# Patient Record
Sex: MALE | Race: BLACK OR AFRICAN AMERICAN
[De-identification: names, ages, dates, MRNs, and addresses within clinical notes are randomized per-mention and may not be internally consistent; named-entity substitution may affect disease eponyms.]

---

## 2018-04-24 ENCOUNTER — HOSPITAL ENCOUNTER (EMERGENCY)
Dept: HOSPITAL 17 - NEPD | Age: 28
Discharge: HOME | End: 2018-04-24
Payer: COMMERCIAL

## 2018-04-24 VITALS
SYSTOLIC BLOOD PRESSURE: 144 MMHG | HEART RATE: 98 BPM | OXYGEN SATURATION: 98 % | RESPIRATION RATE: 18 BRPM | TEMPERATURE: 98.4 F | DIASTOLIC BLOOD PRESSURE: 61 MMHG

## 2018-04-24 VITALS — HEIGHT: 70 IN | BODY MASS INDEX: 31.56 KG/M2 | WEIGHT: 220.46 LBS

## 2018-04-24 DIAGNOSIS — S41.112A: Primary | ICD-10-CM

## 2018-04-24 DIAGNOSIS — Z23: ICD-10-CM
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
## 2018-04-24 DIAGNOSIS — Z21: ICD-10-CM

## 2018-04-24 DIAGNOSIS — F17.210: ICD-10-CM

## 2018-04-24 PROCEDURE — 12002 RPR S/N/AX/GEN/TRNK2.6-7.5CM: CPT

## 2018-04-24 PROCEDURE — 90471 IMMUNIZATION ADMIN: CPT

## 2018-04-24 PROCEDURE — 90714 TD VACC NO PRESV 7 YRS+ IM: CPT
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
## 2018-10-03 ENCOUNTER — HOSPITAL ENCOUNTER (EMERGENCY)
Age: 28
Discharge: HOME | End: 2018-10-03
Payer: COMMERCIAL

## 2018-10-03 VITALS
TEMPERATURE: 98.2 F | HEIGHT: 70 IN | WEIGHT: 224 LBS | BODY MASS INDEX: 32.07 KG/M2 | SYSTOLIC BLOOD PRESSURE: 115 MMHG | DIASTOLIC BLOOD PRESSURE: 63 MMHG | RESPIRATION RATE: 16 BRPM | OXYGEN SATURATION: 99 % | HEART RATE: 75 BPM

## 2018-10-03 DIAGNOSIS — T22.112A BURN OF FIRST DEGREE OF LEFT FOREARM, INITIAL ENCOUNTER: Primary | ICD-10-CM

## 2018-10-03 DIAGNOSIS — T22.212A BURN OF SECOND DEGREE OF LEFT FOREARM, INITIAL ENCOUNTER: ICD-10-CM

## 2018-10-03 PROCEDURE — 6370000000 HC RX 637 (ALT 250 FOR IP): Performed by: PHYSICIAN ASSISTANT

## 2018-10-03 PROCEDURE — 99283 EMERGENCY DEPT VISIT LOW MDM: CPT

## 2018-10-03 RX ORDER — IBUPROFEN 800 MG/1
800 TABLET ORAL EVERY 8 HOURS PRN
Qty: 15 TABLET | Refills: 0 | Status: SHIPPED | OUTPATIENT
Start: 2018-10-03 | End: 2022-08-05

## 2018-10-03 RX ORDER — HYDROCODONE BITARTRATE AND ACETAMINOPHEN 5; 325 MG/1; MG/1
1 TABLET ORAL ONCE
Status: COMPLETED | OUTPATIENT
Start: 2018-10-03 | End: 2018-10-03

## 2018-10-03 RX ORDER — BACITRACIN, NEOMYCIN, POLYMYXIN B 400; 3.5; 5 [USP'U]/G; MG/G; [USP'U]/G
OINTMENT TOPICAL
Qty: 30 G | Refills: 1 | Status: ON HOLD | OUTPATIENT
Start: 2018-10-03 | End: 2022-02-21 | Stop reason: ALTCHOICE

## 2018-10-03 RX ORDER — HYDROCODONE BITARTRATE AND ACETAMINOPHEN 5; 325 MG/1; MG/1
1 TABLET ORAL EVERY 6 HOURS PRN
Qty: 6 TABLET | Refills: 0 | Status: SHIPPED | OUTPATIENT
Start: 2018-10-03 | End: 2018-10-05

## 2018-10-03 RX ORDER — IBUPROFEN 800 MG/1
800 TABLET ORAL ONCE
Status: COMPLETED | OUTPATIENT
Start: 2018-10-03 | End: 2018-10-03

## 2018-10-03 RX ORDER — DIAPER,BRIEF,INFANT-TODD,DISP
EACH MISCELLANEOUS 2 TIMES DAILY
Status: DISCONTINUED | OUTPATIENT
Start: 2018-10-03 | End: 2018-10-03 | Stop reason: HOSPADM

## 2018-10-03 RX ADMIN — IBUPROFEN 800 MG: 800 TABLET ORAL at 16:42

## 2018-10-03 RX ADMIN — HYDROCODONE BITARTRATE AND ACETAMINOPHEN 1 TABLET: 5; 325 TABLET ORAL at 16:42

## 2018-10-03 RX ADMIN — BACITRACIN ZINC: 500 OINTMENT TOPICAL at 17:19

## 2018-10-03 ASSESSMENT — PAIN SCALES - GENERAL
PAINLEVEL_OUTOF10: 4
PAINLEVEL_OUTOF10: 2

## 2018-10-03 ASSESSMENT — ENCOUNTER SYMPTOMS
ROS SKIN COMMENTS: LEFT FOREARM
RESPIRATORY NEGATIVE: 1
EYES NEGATIVE: 1
GASTROINTESTINAL NEGATIVE: 1

## 2018-10-03 ASSESSMENT — PAIN DESCRIPTION - LOCATION: LOCATION: ARM

## 2018-10-03 ASSESSMENT — PAIN DESCRIPTION - DESCRIPTORS: DESCRIPTORS: BURNING

## 2018-10-03 ASSESSMENT — PAIN DESCRIPTION - PAIN TYPE: TYPE: ACUTE PAIN

## 2018-10-03 ASSESSMENT — PAIN DESCRIPTION - ORIENTATION: ORIENTATION: INNER
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
## 2019-01-07 ENCOUNTER — NURSE ONLY (OUTPATIENT)
Dept: INFECTIOUS DISEASES | Age: 29
End: 2019-01-07

## 2019-01-07 DIAGNOSIS — B20 HUMAN IMMUNODEFICIENCY VIRUS (HCC): Primary | ICD-10-CM

## 2019-01-08 DIAGNOSIS — B20 HIV (HUMAN IMMUNODEFICIENCY VIRUS INFECTION) (HCC): Primary | ICD-10-CM

## 2019-01-09 ENCOUNTER — NURSE ONLY (OUTPATIENT)
Dept: INFECTIOUS DISEASES | Age: 29
End: 2019-01-09

## 2019-01-09 DIAGNOSIS — B20 HUMAN IMMUNODEFICIENCY VIRUS (HCC): Primary | ICD-10-CM

## 2019-01-09 DIAGNOSIS — B20 HIV (HUMAN IMMUNODEFICIENCY VIRUS INFECTION) (HCC): ICD-10-CM

## 2019-01-09 LAB
ALBUMIN SERPL-MCNC: 4.6 G/DL (ref 3.9–4.9)
ALP BLD-CCNC: 57 U/L (ref 35–104)
ALT SERPL-CCNC: 27 U/L (ref 0–41)
ANION GAP SERPL CALCULATED.3IONS-SCNC: 14 MEQ/L (ref 7–13)
AST SERPL-CCNC: 32 U/L (ref 0–40)
BILIRUB SERPL-MCNC: <0.2 MG/DL (ref 0–1.2)
BUN BLDV-MCNC: 12 MG/DL (ref 6–20)
CALCIUM SERPL-MCNC: 9.7 MG/DL (ref 8.6–10.2)
CHLORIDE BLD-SCNC: 103 MEQ/L (ref 98–107)
CO2: 26 MEQ/L (ref 22–29)
CREAT SERPL-MCNC: 0.7 MG/DL (ref 0.7–1.2)
GFR AFRICAN AMERICAN: >60
GFR NON-AFRICAN AMERICAN: >60
GLOBULIN: 4 G/DL (ref 2.3–3.5)
GLUCOSE BLD-MCNC: 70 MG/DL (ref 74–109)
HBV SURFACE AB TITR SER: REACTIVE MIU/ML
HCT VFR BLD CALC: 42.5 % (ref 42–52)
HEMOGLOBIN: 14.7 G/DL (ref 14–18)
MCH RBC QN AUTO: 31.6 PG (ref 27–31.3)
MCHC RBC AUTO-ENTMCNC: 34.6 % (ref 33–37)
MCV RBC AUTO: 91.5 FL (ref 80–100)
PDW BLD-RTO: 12.7 % (ref 11.5–14.5)
PLATELET # BLD: 145 K/UL (ref 130–400)
POTASSIUM SERPL-SCNC: 4 MEQ/L (ref 3.5–5.1)
RBC # BLD: 4.65 M/UL (ref 4.7–6.1)
SODIUM BLD-SCNC: 143 MEQ/L (ref 132–144)
TOTAL PROTEIN: 8.6 G/DL (ref 6.4–8.1)
WBC # BLD: 3 K/UL (ref 4.8–10.8)

## 2019-01-11 LAB
ABSOLUTE CD 4 HELPER: 281 CELLS/UL (ref 430–1800)
CD4 % HELPER T CELL: 17 % (ref 32–64)
HEPATITIS B SURFACE ANTIGEN INTERPRETATION: NORMAL
HEPATITIS C ANTIBODY INTERPRETATION: NORMAL
LYMPHOCYTE SUBSET PANEL 2 INFO: ABNORMAL
RPR TITER: NORMAL
RPR: REACTIVE

## 2019-01-12 LAB
CMV DNA QNT PCR: ABNORMAL LOG CPY/ML
CMV DNA QUANTATATIVE INTERPRETATION: ABNORMAL LOG IU/ML
CMV DNA QUANTATATIVE INTERPRETATION: DETECTED
CMV DNA QUANTITATIVE: ABNORMAL IU/ML
CMV SOURCE: ABNORMAL
CMVQ COPY/ML: ABNORMAL CPY/ML
TOXOPLASMA GONDII AB IGG: <3 IU/ML

## 2019-01-13 LAB
QUANTI TB GOLD PLUS: NEGATIVE
QUANTI TB1 MINUS NIL: 0 IU/ML (ref 0–0.34)
QUANTI TB2 MINUS NIL: 0 IU/ML (ref 0–0.34)
QUANTIFERON MITOGEN: 6.07 IU/ML
QUANTIFERON NIL: 0.07 IU/ML

## 2019-01-14 LAB
EER HIV GENOTYPING: NORMAL
HAV AB SERPL IA-ACNC: NEGATIVE
HEPATITIS B CORE TOTAL ANTIBODY: NEGATIVE
HERPES TYPE 1/2 IGM COMBINED: 0.7 IV
HERPES TYPE I/II IGG COMBINED: >22.4 IV
HIV-1 GENOTYPE: NORMAL
HLA-B*5701 GENOTYPING: NEGATIVE
HLA-B*5701 SPECIMEN: NORMAL
HSV 1 GLYCOPROTEIN G AB IGG: 26.9 IV
HSV 2 GLYCOPROTEIN G AB IGG: 2.68 IV
TREPONEMA PALLIDUM ANTIBODIES: REACTIVE

## 2019-01-15 ENCOUNTER — TELEPHONE (OUTPATIENT)
Dept: INFECTIOUS DISEASES | Age: 29
End: 2019-01-15

## 2019-01-15 LAB
C. TRACHOMATIS DNA ,URINE: NEGATIVE
N. GONORRHOEAE DNA, URINE: NEGATIVE

## 2019-01-16 LAB
G6PD ALLELE 1: NEGATIVE
G6PD ALLELE 2: NORMAL
G6PD MUTATION INTERPRETATION: NORMAL
G6PD SPECIMEN: NORMAL

## 2019-01-17 ENCOUNTER — OFFICE VISIT (OUTPATIENT)
Dept: INFECTIOUS DISEASES | Age: 29
End: 2019-01-17
Payer: COMMERCIAL

## 2019-01-17 VITALS
HEART RATE: 67 BPM | SYSTOLIC BLOOD PRESSURE: 106 MMHG | BODY MASS INDEX: 35.5 KG/M2 | RESPIRATION RATE: 18 BRPM | TEMPERATURE: 97.9 F | WEIGHT: 248 LBS | HEIGHT: 70 IN | DIASTOLIC BLOOD PRESSURE: 65 MMHG

## 2019-01-17 DIAGNOSIS — B20 HIV (HUMAN IMMUNODEFICIENCY VIRUS INFECTION) (HCC): Primary | ICD-10-CM

## 2019-01-17 PROCEDURE — 99203 OFFICE O/P NEW LOW 30 MIN: CPT | Performed by: INTERNAL MEDICINE

## 2019-01-17 ASSESSMENT — ENCOUNTER SYMPTOMS
GASTROINTESTINAL NEGATIVE: 1
EYES NEGATIVE: 1
RESPIRATORY NEGATIVE: 1

## 2019-01-18 ENCOUNTER — TELEPHONE (OUTPATIENT)
Dept: INFECTIOUS DISEASES | Age: 29
End: 2019-01-18

## 2019-01-18 LAB
HIV-1 QNT LOG, IU/ML: 3.76 LOG CPY/ML
HIV-1 QNT, IU/ML: ABNORMAL CPY/ML
INTERPRETATION: DETECTED

## 2019-01-22 ENCOUNTER — TELEPHONE (OUTPATIENT)
Dept: INFECTIOUS DISEASES | Age: 29
End: 2019-01-22

## 2019-02-03 ENCOUNTER — APPOINTMENT (OUTPATIENT)
Dept: GENERAL RADIOLOGY | Age: 29
End: 2019-02-03
Payer: MEDICAID

## 2019-02-03 ENCOUNTER — HOSPITAL ENCOUNTER (EMERGENCY)
Age: 29
Discharge: HOME | End: 2019-02-03
Payer: MEDICAID

## 2019-02-03 VITALS
BODY MASS INDEX: 30.78 KG/M2 | DIASTOLIC BLOOD PRESSURE: 72 MMHG | OXYGEN SATURATION: 98 % | TEMPERATURE: 99.5 F | RESPIRATION RATE: 18 BRPM | HEIGHT: 70 IN | SYSTOLIC BLOOD PRESSURE: 112 MMHG | WEIGHT: 215 LBS | HEART RATE: 89 BPM

## 2019-02-03 DIAGNOSIS — J10.1 INFLUENZA A: Primary | ICD-10-CM

## 2019-02-03 LAB
ALBUMIN SERPL-MCNC: 4.7 G/DL (ref 3.9–4.9)
ALP BLD-CCNC: 71 U/L (ref 35–104)
ALT SERPL-CCNC: 33 U/L (ref 0–41)
ANION GAP SERPL CALCULATED.3IONS-SCNC: 11 MEQ/L (ref 7–13)
AST SERPL-CCNC: 37 U/L (ref 0–40)
BACTERIA: NEGATIVE /HPF
BASOPHILS ABSOLUTE: 0 K/UL (ref 0–0.2)
BASOPHILS RELATIVE PERCENT: 0.2 %
BILIRUB SERPL-MCNC: 0.3 MG/DL (ref 0–1.2)
BILIRUBIN URINE: NEGATIVE
BLOOD, URINE: ABNORMAL
BUN BLDV-MCNC: 9 MG/DL (ref 6–20)
CALCIUM SERPL-MCNC: 9.3 MG/DL (ref 8.6–10.2)
CHLORIDE BLD-SCNC: 97 MEQ/L (ref 98–107)
CLARITY: CLEAR
CO2: 27 MEQ/L (ref 22–29)
COLOR: YELLOW
CREAT SERPL-MCNC: 1.11 MG/DL (ref 0.7–1.2)
EOSINOPHILS ABSOLUTE: 0 K/UL (ref 0–0.7)
EOSINOPHILS RELATIVE PERCENT: 0 %
EPITHELIAL CELLS, UA: ABNORMAL /HPF (ref 0–5)
GFR AFRICAN AMERICAN: >60
GFR NON-AFRICAN AMERICAN: >60
GLOBULIN: 4.2 G/DL (ref 2.3–3.5)
GLUCOSE BLD-MCNC: 112 MG/DL (ref 74–109)
GLUCOSE URINE: NEGATIVE MG/DL
HCT VFR BLD CALC: 44.8 % (ref 42–52)
HEMOGLOBIN: 15.1 G/DL (ref 14–18)
HYALINE CASTS: ABNORMAL /HPF (ref 0–5)
KETONES, URINE: ABNORMAL MG/DL
LEUKOCYTE ESTERASE, URINE: NEGATIVE
LYMPHOCYTES ABSOLUTE: 1.5 K/UL (ref 1–4.8)
LYMPHOCYTES RELATIVE PERCENT: 23.1 %
MCH RBC QN AUTO: 31.2 PG (ref 27–31.3)
MCHC RBC AUTO-ENTMCNC: 33.8 % (ref 33–37)
MCV RBC AUTO: 92.4 FL (ref 80–100)
MONOCYTES ABSOLUTE: 0.9 K/UL (ref 0.2–0.8)
MONOCYTES RELATIVE PERCENT: 12.9 %
NEUTROPHILS ABSOLUTE: 4.2 K/UL (ref 1.4–6.5)
NEUTROPHILS RELATIVE PERCENT: 63.8 %
NITRITE, URINE: NEGATIVE
PDW BLD-RTO: 13.2 % (ref 11.5–14.5)
PH UA: 5.5 (ref 5–9)
PLATELET # BLD: 141 K/UL (ref 130–400)
POTASSIUM SERPL-SCNC: 4.1 MEQ/L (ref 3.5–5.1)
PROTEIN UA: ABNORMAL MG/DL
RAPID INFLUENZA  B AGN: NEGATIVE
RAPID INFLUENZA A AGN: POSITIVE
RBC # BLD: 4.85 M/UL (ref 4.7–6.1)
RBC UA: ABNORMAL /HPF (ref 0–5)
SODIUM BLD-SCNC: 135 MEQ/L (ref 132–144)
SPECIFIC GRAVITY UA: 1.03 (ref 1–1.03)
TOTAL PROTEIN: 8.9 G/DL (ref 6.4–8.1)
URINE REFLEX TO CULTURE: YES
UROBILINOGEN, URINE: 0.2 E.U./DL
WBC # BLD: 6.6 K/UL (ref 4.8–10.8)
WBC UA: ABNORMAL /HPF (ref 0–5)

## 2019-02-03 PROCEDURE — 80053 COMPREHEN METABOLIC PANEL: CPT

## 2019-02-03 PROCEDURE — 71046 X-RAY EXAM CHEST 2 VIEWS: CPT

## 2019-02-03 PROCEDURE — 6370000000 HC RX 637 (ALT 250 FOR IP): Performed by: NURSE PRACTITIONER

## 2019-02-03 PROCEDURE — 81001 URINALYSIS AUTO W/SCOPE: CPT

## 2019-02-03 PROCEDURE — 87804 INFLUENZA ASSAY W/OPTIC: CPT

## 2019-02-03 PROCEDURE — 87086 URINE CULTURE/COLONY COUNT: CPT

## 2019-02-03 PROCEDURE — 36415 COLL VENOUS BLD VENIPUNCTURE: CPT

## 2019-02-03 PROCEDURE — 99284 EMERGENCY DEPT VISIT MOD MDM: CPT

## 2019-02-03 PROCEDURE — 85025 COMPLETE CBC W/AUTO DIFF WBC: CPT

## 2019-02-03 RX ORDER — ACETAMINOPHEN 500 MG
1000 TABLET ORAL ONCE
Status: COMPLETED | OUTPATIENT
Start: 2019-02-03 | End: 2019-02-03

## 2019-02-03 RX ORDER — BENZONATATE 100 MG/1
100 CAPSULE ORAL 3 TIMES DAILY PRN
Qty: 21 CAPSULE | Refills: 0 | Status: SHIPPED | OUTPATIENT
Start: 2019-02-03 | End: 2019-02-10

## 2019-02-03 RX ORDER — OSELTAMIVIR PHOSPHATE 75 MG/1
75 CAPSULE ORAL 2 TIMES DAILY
Qty: 10 CAPSULE | Refills: 0 | Status: SHIPPED | OUTPATIENT
Start: 2019-02-03 | End: 2019-02-08

## 2019-02-03 RX ORDER — IBUPROFEN 800 MG/1
800 TABLET ORAL ONCE
Status: COMPLETED | OUTPATIENT
Start: 2019-02-03 | End: 2019-02-03

## 2019-02-03 RX ORDER — OSELTAMIVIR PHOSPHATE 75 MG/1
75 CAPSULE ORAL ONCE
Status: COMPLETED | OUTPATIENT
Start: 2019-02-03 | End: 2019-02-03

## 2019-02-03 RX ORDER — ONDANSETRON 4 MG/1
4 TABLET, ORALLY DISINTEGRATING ORAL ONCE
Status: COMPLETED | OUTPATIENT
Start: 2019-02-03 | End: 2019-02-03

## 2019-02-03 RX ADMIN — ACETAMINOPHEN 1000 MG: 500 TABLET ORAL at 19:29

## 2019-02-03 RX ADMIN — ONDANSETRON 4 MG: 4 TABLET, ORALLY DISINTEGRATING ORAL at 18:49

## 2019-02-03 RX ADMIN — IBUPROFEN 800 MG: 800 TABLET ORAL at 18:49

## 2019-02-03 RX ADMIN — OSELTAMIVIR PHOSPHATE 75 MG: 75 CAPSULE ORAL at 18:50

## 2019-02-03 ASSESSMENT — PAIN DESCRIPTION - PAIN TYPE
TYPE: ACUTE PAIN

## 2019-02-03 ASSESSMENT — ENCOUNTER SYMPTOMS
ABDOMINAL PAIN: 0
SORE THROAT: 0
COUGH: 1
BACK PAIN: 0
CHEST TIGHTNESS: 0
TROUBLE SWALLOWING: 0
SHORTNESS OF BREATH: 0
CONSTIPATION: 0
ABDOMINAL DISTENTION: 0
WHEEZING: 0
NAUSEA: 1
VOMITING: 0
DIARRHEA: 0
RHINORRHEA: 1
COLOR CHANGE: 0

## 2019-02-03 ASSESSMENT — PAIN DESCRIPTION - DESCRIPTORS
DESCRIPTORS: ACHING
DESCRIPTORS: ACHING

## 2019-02-03 ASSESSMENT — PAIN SCALES - GENERAL
PAINLEVEL_OUTOF10: 7
PAINLEVEL_OUTOF10: 5
PAINLEVEL_OUTOF10: 7

## 2019-02-03 ASSESSMENT — PAIN DESCRIPTION - LOCATION
LOCATION: GENERALIZED
LOCATION: GENERALIZED
LOCATION: HIP

## 2019-02-05 LAB — URINE CULTURE, ROUTINE: NORMAL

## 2019-02-11 ENCOUNTER — TELEPHONE (OUTPATIENT)
Dept: INFECTIOUS DISEASES | Age: 29
End: 2019-02-11

## 2019-02-18 ENCOUNTER — TELEPHONE (OUTPATIENT)
Dept: INFECTIOUS DISEASES | Age: 29
End: 2019-02-18

## 2019-02-27 ENCOUNTER — TELEPHONE (OUTPATIENT)
Dept: INFECTIOUS DISEASES | Age: 29
End: 2019-02-27

## 2019-03-05 ENCOUNTER — TELEPHONE (OUTPATIENT)
Dept: INFECTIOUS DISEASES | Age: 29
End: 2019-03-05

## 2019-03-05 DIAGNOSIS — B20 HIV (HUMAN IMMUNODEFICIENCY VIRUS INFECTION) (HCC): Primary | ICD-10-CM

## 2019-03-06 ENCOUNTER — TELEPHONE (OUTPATIENT)
Dept: INFECTIOUS DISEASES | Age: 29
End: 2019-03-06

## 2019-03-06 ENCOUNTER — NURSE ONLY (OUTPATIENT)
Dept: INFECTIOUS DISEASES | Age: 29
End: 2019-03-06

## 2019-03-06 DIAGNOSIS — B20 HIV (HUMAN IMMUNODEFICIENCY VIRUS INFECTION) (HCC): ICD-10-CM

## 2019-03-06 LAB
ALBUMIN SERPL-MCNC: 4.7 G/DL (ref 3.5–4.6)
ALP BLD-CCNC: 58 U/L (ref 35–104)
ALT SERPL-CCNC: 25 U/L (ref 0–41)
ANION GAP SERPL CALCULATED.3IONS-SCNC: 19 MEQ/L (ref 9–15)
AST SERPL-CCNC: 24 U/L (ref 0–40)
BILIRUB SERPL-MCNC: 0.3 MG/DL (ref 0.2–0.7)
BUN BLDV-MCNC: 8 MG/DL (ref 6–20)
CALCIUM SERPL-MCNC: 9.4 MG/DL (ref 8.5–9.9)
CHLORIDE BLD-SCNC: 105 MEQ/L (ref 95–107)
CO2: 23 MEQ/L (ref 20–31)
CREAT SERPL-MCNC: 0.82 MG/DL (ref 0.7–1.2)
GFR AFRICAN AMERICAN: >60
GFR NON-AFRICAN AMERICAN: >60
GLOBULIN: 3.9 G/DL (ref 2.3–3.5)
GLUCOSE BLD-MCNC: 64 MG/DL (ref 70–99)
HCT VFR BLD CALC: 43.3 % (ref 42–52)
HEMOGLOBIN: 14.6 G/DL (ref 14–18)
MCH RBC QN AUTO: 31.1 PG (ref 27–31.3)
MCHC RBC AUTO-ENTMCNC: 33.8 % (ref 33–37)
MCV RBC AUTO: 92 FL (ref 80–100)
PDW BLD-RTO: 14.3 % (ref 11.5–14.5)
PLATELET # BLD: 149 K/UL (ref 130–400)
POTASSIUM SERPL-SCNC: 4.2 MEQ/L (ref 3.4–4.9)
RBC # BLD: 4.7 M/UL (ref 4.7–6.1)
SODIUM BLD-SCNC: 147 MEQ/L (ref 135–144)
TOTAL PROTEIN: 8.6 G/DL (ref 6.3–8)
WBC # BLD: 3.9 K/UL (ref 4.8–10.8)

## 2019-03-08 LAB
ABSOLUTE CD 4 HELPER: 413 CELLS/UL (ref 430–1800)
CD4 % HELPER T CELL: 18 % (ref 32–64)
LYMPHOCYTE SUBSET PANEL 2 INFO: ABNORMAL

## 2019-03-09 LAB
HIV-1 QNT LOG, IU/ML: <1.47 LOG CPY/ML
HIV-1 QNT, IU/ML: ABNORMAL CPY/ML
INTERPRETATION: DETECTED

## 2019-03-19 ENCOUNTER — TELEPHONE (OUTPATIENT)
Dept: INFECTIOUS DISEASES | Age: 29
End: 2019-03-19

## 2019-03-21 ENCOUNTER — NURSE ONLY (OUTPATIENT)
Dept: INFECTIOUS DISEASES | Age: 29
End: 2019-03-21

## 2019-03-21 ENCOUNTER — OFFICE VISIT (OUTPATIENT)
Dept: INFECTIOUS DISEASES | Age: 29
End: 2019-03-21
Payer: MEDICAID

## 2019-03-21 VITALS
TEMPERATURE: 98 F | BODY MASS INDEX: 37.37 KG/M2 | DIASTOLIC BLOOD PRESSURE: 70 MMHG | WEIGHT: 261 LBS | RESPIRATION RATE: 20 BRPM | HEART RATE: 62 BPM | SYSTOLIC BLOOD PRESSURE: 118 MMHG | HEIGHT: 70 IN

## 2019-03-21 DIAGNOSIS — B20 HIV (HUMAN IMMUNODEFICIENCY VIRUS INFECTION) (HCC): Primary | ICD-10-CM

## 2019-03-21 PROCEDURE — 99213 OFFICE O/P EST LOW 20 MIN: CPT | Performed by: INTERNAL MEDICINE

## 2019-03-21 PROCEDURE — G8417 CALC BMI ABV UP PARAM F/U: HCPCS | Performed by: INTERNAL MEDICINE

## 2019-03-21 PROCEDURE — 1036F TOBACCO NON-USER: CPT | Performed by: INTERNAL MEDICINE

## 2019-03-21 PROCEDURE — G8427 DOCREV CUR MEDS BY ELIG CLIN: HCPCS | Performed by: INTERNAL MEDICINE

## 2019-03-21 PROCEDURE — G8484 FLU IMMUNIZE NO ADMIN: HCPCS | Performed by: INTERNAL MEDICINE

## 2019-03-21 ASSESSMENT — ENCOUNTER SYMPTOMS
VOMITING: 0
DIARRHEA: 0
NAUSEA: 0
EYES NEGATIVE: 1
ABDOMINAL PAIN: 0
RESPIRATORY NEGATIVE: 1
GASTROINTESTINAL NEGATIVE: 1
SHORTNESS OF BREATH: 0
COUGH: 0

## 2019-03-25 ENCOUNTER — TELEPHONE (OUTPATIENT)
Dept: INFECTIOUS DISEASES | Age: 29
End: 2019-03-25

## 2019-03-27 ENCOUNTER — HOSPITAL ENCOUNTER (OUTPATIENT)
Dept: NON INVASIVE DIAGNOSTICS | Age: 29
Discharge: HOME | End: 2019-03-27
Payer: MEDICAID

## 2019-03-27 LAB
EKG ATRIAL RATE: 61 BPM
EKG P AXIS: 14 DEGREES
EKG P-R INTERVAL: 184 MS
EKG Q-T INTERVAL: 388 MS
EKG QRS DURATION: 108 MS
EKG QTC CALCULATION (BAZETT): 390 MS
EKG R AXIS: 71 DEGREES
EKG T AXIS: 41 DEGREES
EKG VENTRICULAR RATE: 61 BPM

## 2019-03-27 PROCEDURE — 93005 ELECTROCARDIOGRAM TRACING: CPT

## 2019-03-29 PROCEDURE — 93010 ELECTROCARDIOGRAM REPORT: CPT | Performed by: INTERNAL MEDICINE

## 2019-04-11 ENCOUNTER — TELEPHONE (OUTPATIENT)
Dept: INFECTIOUS DISEASES | Age: 29
End: 2019-04-11

## 2019-04-15 ENCOUNTER — TELEPHONE (OUTPATIENT)
Dept: INFECTIOUS DISEASES | Age: 29
End: 2019-04-15

## 2019-04-15 NOTE — TELEPHONE ENCOUNTER
Pt called into clinic requesting med . Walk in to clinic. States he missed \"one or two days\". . Reinforced compliance at length. He is much happier. Love his new apartment. 1 month of meds given to pt.    Denies problems at this time  Pt will call with further problems or concerns

## 2019-04-22 ENCOUNTER — TELEPHONE (OUTPATIENT)
Dept: INFECTIOUS DISEASES | Age: 29
End: 2019-04-22

## 2019-04-22 NOTE — TELEPHONE ENCOUNTER
Transport request from pt for 4/23 appt 9A at [facility]. Assessed needs. Transport  Made as last resort. Transport aware.

## 2019-04-25 ENCOUNTER — TELEPHONE (OUTPATIENT)
Dept: INFECTIOUS DISEASES | Age: 29
End: 2019-04-25

## 2019-04-29 RX ORDER — ZIPRASIDONE HYDROCHLORIDE 20 MG/1
CAPSULE ORAL
Refills: 1 | COMMUNITY
Start: 2019-03-27

## 2019-04-30 ENCOUNTER — TELEPHONE (OUTPATIENT)
Dept: INFECTIOUS DISEASES | Age: 29
End: 2019-04-30

## 2019-04-30 NOTE — TELEPHONE ENCOUNTER
Pt called in requesting help filling out SS paperwork. Walk in to clinic. Paper work filled out and pt will mail out 4-30-19. He denied any other needs.

## 2019-05-02 ENCOUNTER — TELEPHONE (OUTPATIENT)
Dept: INFECTIOUS DISEASES | Age: 29
End: 2019-05-02

## 2019-05-03 ENCOUNTER — HOSPITAL ENCOUNTER (EMERGENCY)
Age: 29
Discharge: HOME | End: 2019-05-03
Payer: MEDICAID

## 2019-05-03 ENCOUNTER — TELEPHONE (OUTPATIENT)
Dept: INFECTIOUS DISEASES | Age: 29
End: 2019-05-03

## 2019-05-03 VITALS
RESPIRATION RATE: 16 BRPM | HEIGHT: 70 IN | TEMPERATURE: 98.5 F | SYSTOLIC BLOOD PRESSURE: 132 MMHG | WEIGHT: 261 LBS | BODY MASS INDEX: 37.37 KG/M2 | DIASTOLIC BLOOD PRESSURE: 76 MMHG | OXYGEN SATURATION: 99 % | HEART RATE: 61 BPM

## 2019-05-03 DIAGNOSIS — L02.91 ABSCESS: Primary | ICD-10-CM

## 2019-05-03 PROCEDURE — 10060 I&D ABSCESS SIMPLE/SINGLE: CPT

## 2019-05-03 PROCEDURE — 99282 EMERGENCY DEPT VISIT SF MDM: CPT

## 2019-05-03 PROCEDURE — 87070 CULTURE OTHR SPECIMN AEROBIC: CPT

## 2019-05-03 PROCEDURE — 87075 CULTR BACTERIA EXCEPT BLOOD: CPT

## 2019-05-03 PROCEDURE — 6370000000 HC RX 637 (ALT 250 FOR IP): Performed by: PHYSICIAN ASSISTANT

## 2019-05-03 PROCEDURE — 2500000003 HC RX 250 WO HCPCS: Performed by: PHYSICIAN ASSISTANT

## 2019-05-03 PROCEDURE — 87205 SMEAR GRAM STAIN: CPT

## 2019-05-03 RX ORDER — CLINDAMYCIN HYDROCHLORIDE 300 MG/1
300 CAPSULE ORAL 4 TIMES DAILY
Qty: 40 CAPSULE | Refills: 0 | Status: SHIPPED | OUTPATIENT
Start: 2019-05-03 | End: 2019-05-13

## 2019-05-03 RX ORDER — CLINDAMYCIN HYDROCHLORIDE 150 MG/1
300 CAPSULE ORAL ONCE
Status: COMPLETED | OUTPATIENT
Start: 2019-05-03 | End: 2019-05-03

## 2019-05-03 RX ORDER — LIDOCAINE HYDROCHLORIDE 10 MG/ML
5 INJECTION, SOLUTION EPIDURAL; INFILTRATION; INTRACAUDAL; PERINEURAL ONCE
Status: COMPLETED | OUTPATIENT
Start: 2019-05-03 | End: 2019-05-03

## 2019-05-03 RX ADMIN — CLINDAMYCIN HYDROCHLORIDE 300 MG: 150 CAPSULE ORAL at 10:00

## 2019-05-03 RX ADMIN — LIDOCAINE HYDROCHLORIDE 5 ML: 10 INJECTION, SOLUTION EPIDURAL; INFILTRATION; INTRACAUDAL; PERINEURAL at 09:23

## 2019-05-03 ASSESSMENT — PAIN DESCRIPTION - LOCATION
LOCATION: BACK
LOCATION: BACK

## 2019-05-03 ASSESSMENT — PAIN DESCRIPTION - DESCRIPTORS
DESCRIPTORS: ACHING
DESCRIPTORS: TENDER

## 2019-05-03 ASSESSMENT — ENCOUNTER SYMPTOMS
EYE DISCHARGE: 0
VOMITING: 0
ABDOMINAL DISTENTION: 0
PHOTOPHOBIA: 0
APNEA: 0
ANAL BLEEDING: 0
VOICE CHANGE: 0
NAUSEA: 0

## 2019-05-03 ASSESSMENT — PAIN DESCRIPTION - PAIN TYPE
TYPE: ACUTE PAIN
TYPE: ACUTE PAIN

## 2019-05-03 ASSESSMENT — PAIN SCALES - GENERAL: PAINLEVEL_OUTOF10: 8

## 2019-05-03 ASSESSMENT — PAIN DESCRIPTION - ORIENTATION: ORIENTATION: LOWER

## 2019-05-03 NOTE — ED PROVIDER NOTES
[address] [facility] ED  eMERGENCY dEPARTMENT eNCOUnter      Pt Name: Del Marie  MRN: [id number]  [place] 1990  Date of evaluation: 5/3/2019  Provider: Josi Wong PA-C    CHIEF COMPLAINT       Chief Complaint   Patient presents with    Abscess     abcess started hurting about 1 week ago         HISTORY OF PRESENT ILLNESS   (Location/Symptom, Timing/Onset,Context/Setting, Quality, Duration, Modifying Factors, Severity)  Note limiting factors. Del Marie is a 33 y.o. male who presents to the emergency department  Pt presesnts with a lump on his back that had been there for ' a while' and was hurting one week ago. He did get drainage from site. He denies fever,chills,n,v,d. Symptoms mild to moderate in severity. toch worsens pain. Nothing improves symptoms. HPI    NursingNotes were reviewed. REVIEW OF SYSTEMS    (2-9 systems for level 4, 10 or more for level 5)     Review of Systems   Constitutional: Negative for activity change, appetite change, chills, fever and unexpected weight change. HENT: Negative for ear discharge, nosebleeds and voice change. Eyes: Negative for photophobia and discharge. Respiratory: Negative for apnea. Cardiovascular: Negative for chest pain. Gastrointestinal: Negative for abdominal distention, anal bleeding, nausea and vomiting. Endocrine: Negative for cold intolerance, heat intolerance and polyphagia. Genitourinary: Negative for dysuria and hematuria. Musculoskeletal: Negative for joint swelling and neck pain. Skin: Negative for pallor. Allergic/Immunologic: Negative for immunocompromised state. Neurological: Negative for seizures and facial asymmetry. Hematological: Does not bruise/bleed easily. Psychiatric/Behavioral: Negative for behavioral problems, self-injury and sleep disturbance. All other systems reviewed and are negative. Except as noted above the remainder of the review of systems was reviewed and negative. PAST MEDICAL HISTORY     Past Medical History:   Diagnosis Date    HIV [facility] virus infection[facility] ([facility] [address]     [facility]     HIV not in treatment currently    Late latent syphilis          SURGICALHISTORY     History reviewed. No pertinent surgical history. CURRENT MEDICATIONS       Previous Medications    ABACAVIR-DOLUTEGRAVIR-LAMIVUD (TRIUMEQ) 600- MG TABS    Take by mouth daily    IBUPROFEN (ADVIL;MOTRIN) 800 MG TABLET    Take 1 tablet by mouth every 8 hours as needed for Pain or Fever    NEOMYCIN-BACITRACIN-POLYMYXIN (NEOSPORIN) 400-5-5000 OINTMENT    Apply topically 2 times daily. SERTRALINE (ZOLOFT) 50 MG TABLET    TK 1 T PO QD    ZIPRASIDONE (GEODON) 20 MG CAPSULE    TK 1 C PO BID WITH ZACK AND DINNER       ALLERGIES     Patient has no known allergies. FAMILY HISTORY     History reviewed. No pertinent family history.        SOCIAL HISTORY       Social History     Socioeconomic History    Marital status: Single     Spouse name: None    Number of children: None    Years of education: None    Highest education level: None   Occupational History    None   Social Needs    Financial resource strain: None    Food insecurity:     Worry: None     Inability: None    Transportation needs:     Medical: None     Non-medical: None   Tobacco Use    Smoking status: Never Smoker    Smokeless tobacco: Never Used   Substance and Sexual Activity    Alcohol use: No    Drug use: Yes     Types: Marijuana     Comment: smoked 2 weeks ago    Sexual activity: None   Lifestyle    Physical activity:     Days per week: None     Minutes per session: None    Stress: None   Relationships    Social connections:     Talks on phone: None     Gets together: None     Attends Gnosticism service: None     Active member of club or organization: None     Attends meetings of clubs or organizations: None     Relationship status: None    Intimate partner violence:     Fear of current or ex partner: None     Emotionally abused: None     Physically abused: None     Forced sexual activity: None   Other Topics Concern    None   Social History Narrative    None       SCREENINGS      @FLOW([id number])@      PHYSICAL EXAM    (up to 7 for level 4, 8 or more for level 5)     ED Triage Vitals [05/03/19 0905]   BP Temp Temp Source Pulse Resp SpO2 Height Weight   132/76 98.5 °F (36.9 °C) Oral 61 16 99 % 5' 10\" (1.778 m) 261 lb (118.4 kg)       Physical Exam   Constitutional: He is oriented to person, place, and time. He appears well-developed and well-nourished. No distress. HENT:   Head: Normocephalic and atraumatic. Right Ear: External ear normal.   Left Ear: External ear normal.   Eyes: Pupils are equal, round, and reactive to light. Right eye exhibits no discharge. Left eye exhibits no discharge. Neck: Normal range of motion. Neck supple. Cardiovascular: Normal rate, regular rhythm, normal heart sounds and intact distal pulses. Pulmonary/Chest: Effort normal and breath sounds normal. No stridor. No respiratory distress. Abdominal: Soft. Bowel sounds are normal. He exhibits no distension. There is no tenderness. Musculoskeletal: Normal range of motion. Neurological: He is alert and oriented to person, place, and time. Skin: Skin is warm. No erythema. 2 cm raised area lower back. Small opening at center. Psychiatric: He has a normal mood and affect. Nursing note and vitals reviewed.       DIAGNOSTIC RESULTS     EKG: All EKG's are interpreted by the Emergency Department Physician who either signs or Co-signsthis chart in the absence of a cardiologist.         RADIOLOGY:   Non-plain filmimages such as CT, Ultrasound and MRI are read by the radiologist. Plain radiographic images are visualized and preliminarily interpreted by the emergency physician with the below findings:         Interpretation per the Radiologist below, if available at the time ofthis note:    No orders to specified.     DISCHARGE MEDICATIONS:  New Prescriptions    No medications on file          (Please note that portions of this note were completed with a voice recognition program.  Efforts were made to edit the dictations but occasionally words are mis-transcribed.)    Nuno Monterroso PA-C (electronically signed)  Attending Emergency Physician       Nuno Monterroso PA-C  05/03/19 [id number]

## 2019-05-03 NOTE — TELEPHONE ENCOUNTER
Patient called earlier today to request a F/u appt with ID, per ER visit he had today. Updated ID Staff, Okay to give appt for this Tues. w/  Dr Hans Boeck. Returned call to patient, CHE, advised to come on Tues. 57/19 at 12noon.

## 2019-05-05 LAB
ANAEROBIC CULTURE: ABNORMAL
GRAM STAIN RESULT: ABNORMAL
ORGANISM: ABNORMAL
WOUND/ABSCESS: ABNORMAL

## 2019-05-06 ENCOUNTER — TELEPHONE (OUTPATIENT)
Dept: INFECTIOUS DISEASES | Age: 29
End: 2019-05-06

## 2019-05-06 NOTE — TELEPHONE ENCOUNTER
Per request, CM scheduled transport for appointment with Dr. Chikis Senior 5/7/19 @ 12N. Pt had abscess drained in ER, needs follow-up. CM also scheduled transport to Support Group meeting 5/14/19 @ 1PM.  CM assessed need, made arrangements as last resort.

## 2019-05-08 ENCOUNTER — TELEPHONE (OUTPATIENT)
Dept: INFECTIOUS DISEASES | Age: 29
End: 2019-05-08

## 2019-05-08 NOTE — TELEPHONE ENCOUNTER
Pt was to follow up yesterday after ER visit for back abscess. He did not show. Call placed to pt to discuss. Brief message left for pt to call.

## 2019-05-08 NOTE — TELEPHONE ENCOUNTER
Pt called back. apologetic about missing apt. States he was with a friend in [place] at the hospital. reinforced importance to call as pt was a NO show for transport as well. Discussed policy again. He verbalized understanding    rescheduled for 1st avaible 5-16-19 9 am   Rn and pt reviewed additional transport options and assistance. It is determined he will use [facility] as last report for help. [facility] notified. Pt aware transport will be there for  about 30 mins prior to apt.

## 2019-05-13 ENCOUNTER — TELEPHONE (OUTPATIENT)
Dept: INFECTIOUS DISEASES | Age: 29
End: 2019-05-13

## 2019-05-13 NOTE — TELEPHONE ENCOUNTER
Pt called in stating he lost his food stamps. He missed some paper work. States [facility] sent it to shelter and not his home. He is in need of help to get to [address] [place] [address] Road. Rn and pt reviewed additional transport options and assistance. It is determined he will use [facility] as last report for help. [facility] notified. Pt aware transport will be there for  about 30 mins prior to apt.      He will go 5/15/19 at 8 am

## 2019-05-15 ENCOUNTER — TELEPHONE (OUTPATIENT)
Dept: INFECTIOUS DISEASES | Age: 29
End: 2019-05-15

## 2019-05-15 NOTE — TELEPHONE ENCOUNTER
Call placed to pt regarding 2nd no show this month. He was scheduled for  yesterday for support group. Discussed policy again with pt. Discussed sanction if one more no show this month. Discussed length of sanction as well. Pt verbalized understanding and stated this will not happen again.

## 2019-05-16 ENCOUNTER — OFFICE VISIT (OUTPATIENT)
Dept: INFECTIOUS DISEASES | Age: 29
End: 2019-05-16
Payer: MEDICAID

## 2019-05-16 VITALS
WEIGHT: 262 LBS | BODY MASS INDEX: 37.51 KG/M2 | TEMPERATURE: 98 F | SYSTOLIC BLOOD PRESSURE: 101 MMHG | HEART RATE: 56 BPM | HEIGHT: 70 IN | RESPIRATION RATE: 20 BRPM | DIASTOLIC BLOOD PRESSURE: 59 MMHG

## 2019-05-16 DIAGNOSIS — B20 HIV (HUMAN IMMUNODEFICIENCY VIRUS INFECTION) (HCC): Primary | ICD-10-CM

## 2019-05-16 PROCEDURE — 99213 OFFICE O/P EST LOW 20 MIN: CPT | Performed by: INTERNAL MEDICINE

## 2019-05-16 PROCEDURE — G8427 DOCREV CUR MEDS BY ELIG CLIN: HCPCS | Performed by: INTERNAL MEDICINE

## 2019-05-16 PROCEDURE — 1036F TOBACCO NON-USER: CPT | Performed by: INTERNAL MEDICINE

## 2019-05-16 PROCEDURE — G8417 CALC BMI ABV UP PARAM F/U: HCPCS | Performed by: INTERNAL MEDICINE

## 2019-05-16 ASSESSMENT — ENCOUNTER SYMPTOMS
COUGH: 0
VOMITING: 0
ABDOMINAL PAIN: 0
SHORTNESS OF BREATH: 0
RESPIRATORY NEGATIVE: 1
NAUSEA: 0
EYES NEGATIVE: 1
GASTROINTESTINAL NEGATIVE: 1
DIARRHEA: 0

## 2019-05-22 ENCOUNTER — TELEPHONE (OUTPATIENT)
Dept: INFECTIOUS DISEASES | Age: 29
End: 2019-05-22

## 2019-06-06 ENCOUNTER — TELEPHONE (OUTPATIENT)
Dept: INFECTIOUS DISEASES | Age: 29
End: 2019-06-06

## 2019-06-06 NOTE — TELEPHONE ENCOUNTER
Pt called office requesting help with basic needs. Requesting food voucher  Needs assessed. 1 food voucher given to pt. Pt mentioned that he thinks he lost his insurance. He is waiting for a letter and he was told he would need an apt with [facility]. Requested he call right away as pt only has 4 pills left. If it is determined he does not have insurance discussed with patient the need for the denial or no longer covered letter as proof of no insurance. If so he will need [employer] formulary to cover meds until insurance is reinstated. He verbalized understanding of importance to do this today and stay on top of this.        Denies other needs at this time and will call with concerns and/or updates

## 2019-06-13 NOTE — TELEPHONE ENCOUNTER
Call placed to pt for update about insurance. Pt was told he will need to re apply for Medicaid. As of now he is with out insurance. appt made for pt 6-14-19 with [facility] for odap to assist with medication as pt is running low. Discussed with pt he will need to provide proof of loss of insurance or proof of re applying. He did verbalize that he will get that information together today. 6-14-19 at 9 am with [facility]    Rn and pt reviewed additional transport options and assistance. It is determined he will use [facility] as last report for help. [facility] notified. Pt aware transport will be there for  about 30 mins prior to apt.

## 2019-06-14 ENCOUNTER — NURSE ONLY (OUTPATIENT)
Dept: INFECTIOUS DISEASES | Age: 29
End: 2019-06-14

## 2019-06-14 NOTE — PROGRESS NOTES
Re-Assessment      Patient ID:   Sin Macedo  Date:   6/14/2019      Client ID:  [id number]    Miguel Cruz [facility] [phone] (home)     Family/House:    [] Partner  [] Children  [] Other -     Mental Health:   Hx of MH    Substance Abuse:   Marijuana Use  Social History     Socioeconomic History    Marital status: Single     Spouse name: Not on file    Number of children: Not on file    Years of education: Not on file    Highest education level: Not on file   Occupational History    Not on file   Social Needs    Financial resource strain: Not on file    Food insecurity:     Worry: Not on file     Inability: Not on file    Transportation needs:     Medical: Not on file     Non-medical: Not on file   Tobacco Use    Smoking status: Never Smoker    Smokeless tobacco: Never Used   Substance and Sexual Activity    Alcohol use: No    Drug use: Yes     Types: Marijuana     Comment: smoked 2 weeks ago    Sexual activity: Not on file   Lifestyle    Physical activity:     Days per week: Not on file     Minutes per session: Not on file    Stress: Not on file   Relationships    Social connections:     Talks on phone: Not on file     Gets together: Not on file     Attends Christianity service: Not on file     Active member of club or organization: Not on file     Attends meetings of clubs or organizations: Not on file     Relationship status: Not on file    Intimate partner violence:     Fear of current or ex partner: Not on file     Emotionally abused: Not on file     Physically abused: Not on file     Forced sexual activity: Not on file   Other Topics Concern    Not on file   Social History Narrative    Not on file       Housing:   apartment    Health/Healthcare:  HIV Stable   Physician Visit:  Dr. Amanuel Rosenthal- last visit 5/16/19   Dental Visit:  CM encouraged follow up    CD4:   Lab Results   Component Value Date    LABABSO 413 03/06/2019       Viral Load:  Lab Results   Component Value Date AFN1CYTEJY <1.47 03/06/2019       Medical Insurance:  Payor: [facility] / Plan: Dominic 250 / Product Type: *No Product type* /    OHDAP/HIPSCA:  Application completed 2/71/65   Renewal Date:  NA    Income:    0    Legal Issues:    NA    Emergency Contact:   Extended Emergency Contact Information  Primary Emergency Contact: Cydney Peguero [facility] of [address] [facility] Phone: [phone]  Relation: Parent  Secondary Emergency Contact: Darci Crow of [address] [place] [address] Phone: [phone]  Relation: Brother/Sister  Pt presented for scheduled appointment with CM. Pt recently lost Medicaid coverage. CM assisted pt in completing OHDAP application for needed meds. CM referred pt to [facility] CM for assistance with reapplying for Medicaid and [employer] [place]. During visit, CM completed Semi-Annual Review. Pt has no changes since intake, regarding RW Part A Eligibility for services. CM reviewed Grievance Policy, Sliding Scale Fee and Transportation Policy with pt. CM completed PSA and SA/MH Assessments. Pt has history of MH. Pt receives counseling at the [facility]. Pt smokes marijuana daily. CM updated ISP, pt signed in agreement wit POC. Pt has missed 1 dose of meds since he ran out and lost coverage. CM encouraged pt to follow up with dentist.  Pt is not sexually active, CM provided information regarding U=U, undetectable equals un-transmittable. CM provided food voucher, need assessed.   CM will follow up with pt as needed,

## 2019-06-26 ENCOUNTER — TELEPHONE (OUTPATIENT)
Dept: INFECTIOUS DISEASES | Age: 29
End: 2019-06-26

## 2019-06-26 NOTE — TELEPHONE ENCOUNTER
Pt called in requesting help with basic needs. Requesting 1 gas voucher      Walk into clinic  Needs assessed. Gas card given to pt. His friend will be taking him to food ló[place] to help his food situation.      Denies other needs at this time and will call with concerns and/or updates

## 2019-07-01 ENCOUNTER — TELEPHONE (OUTPATIENT)
Dept: INFECTIOUS DISEASES | Age: 29
End: 2019-07-01

## 2019-07-02 ENCOUNTER — TELEPHONE (OUTPATIENT)
Dept: INFECTIOUS DISEASES | Age: 29
End: 2019-07-02

## 2019-07-03 ENCOUNTER — TELEPHONE (OUTPATIENT)
Dept: INFECTIOUS DISEASES | Age: 29
End: 2019-07-03

## 2019-07-10 ENCOUNTER — TELEPHONE (OUTPATIENT)
Dept: INFECTIOUS DISEASES | Age: 29
End: 2019-07-10

## 2019-07-11 ENCOUNTER — TELEPHONE (OUTPATIENT)
Dept: INFECTIOUS DISEASES | Age: 29
End: 2019-07-11

## 2019-07-12 ENCOUNTER — HOSPITAL ENCOUNTER (EMERGENCY)
Age: 29
Discharge: HOME | End: 2019-07-12
Payer: MEDICAID

## 2019-07-12 ENCOUNTER — TELEPHONE (OUTPATIENT)
Dept: INFECTIOUS DISEASES | Age: 29
End: 2019-07-12

## 2019-07-12 VITALS
RESPIRATION RATE: 16 BRPM | OXYGEN SATURATION: 99 % | BODY MASS INDEX: 37.37 KG/M2 | SYSTOLIC BLOOD PRESSURE: 125 MMHG | HEIGHT: 70 IN | TEMPERATURE: 98.3 F | WEIGHT: 261 LBS | DIASTOLIC BLOOD PRESSURE: 73 MMHG | HEART RATE: 69 BPM

## 2019-07-12 DIAGNOSIS — L02.212 ABSCESS OF LOWER BACK: Primary | ICD-10-CM

## 2019-07-12 PROCEDURE — 10060 I&D ABSCESS SIMPLE/SINGLE: CPT

## 2019-07-12 PROCEDURE — 87070 CULTURE OTHR SPECIMN AEROBIC: CPT

## 2019-07-12 PROCEDURE — 2500000003 HC RX 250 WO HCPCS: Performed by: NURSE PRACTITIONER

## 2019-07-12 PROCEDURE — 87075 CULTR BACTERIA EXCEPT BLOOD: CPT

## 2019-07-12 PROCEDURE — 87205 SMEAR GRAM STAIN: CPT

## 2019-07-12 PROCEDURE — 6370000000 HC RX 637 (ALT 250 FOR IP): Performed by: NURSE PRACTITIONER

## 2019-07-12 PROCEDURE — 99282 EMERGENCY DEPT VISIT SF MDM: CPT

## 2019-07-12 RX ORDER — OXYCODONE HYDROCHLORIDE AND ACETAMINOPHEN 5; 325 MG/1; MG/1
1 TABLET ORAL ONCE
Status: COMPLETED | OUTPATIENT
Start: 2019-07-12 | End: 2019-07-12

## 2019-07-12 RX ORDER — TRAMADOL HYDROCHLORIDE 50 MG/1
50 TABLET ORAL EVERY 4 HOURS PRN
Qty: 10 TABLET | Refills: 0 | Status: SHIPPED | OUTPATIENT
Start: 2019-07-12 | End: 2019-07-17

## 2019-07-12 RX ORDER — CLINDAMYCIN HYDROCHLORIDE 300 MG/1
300 CAPSULE ORAL 4 TIMES DAILY
Qty: 40 CAPSULE | Refills: 0 | Status: SHIPPED | OUTPATIENT
Start: 2019-07-12 | End: 2019-07-22

## 2019-07-12 RX ORDER — CLINDAMYCIN HYDROCHLORIDE 150 MG/1
300 CAPSULE ORAL ONCE
Status: COMPLETED | OUTPATIENT
Start: 2019-07-12 | End: 2019-07-12

## 2019-07-12 RX ORDER — LIDOCAINE HYDROCHLORIDE 20 MG/ML
5 INJECTION, SOLUTION INFILTRATION; PERINEURAL ONCE
Status: COMPLETED | OUTPATIENT
Start: 2019-07-12 | End: 2019-07-12

## 2019-07-12 RX ADMIN — OXYCODONE HYDROCHLORIDE AND ACETAMINOPHEN 1 TABLET: 5; 325 TABLET ORAL at 09:09

## 2019-07-12 RX ADMIN — LIDOCAINE HYDROCHLORIDE 5 ML: 20 INJECTION, SOLUTION INFILTRATION; PERINEURAL at 09:11

## 2019-07-12 RX ADMIN — CLINDAMYCIN HYDROCHLORIDE 300 MG: 150 CAPSULE ORAL at 09:10

## 2019-07-12 ASSESSMENT — ENCOUNTER SYMPTOMS
COUGH: 0
SHORTNESS OF BREATH: 0
SORE THROAT: 0
RHINORRHEA: 0
PHOTOPHOBIA: 0
ABDOMINAL PAIN: 0
NAUSEA: 0
BACK PAIN: 0
EYE PAIN: 0
VOMITING: 0
DIARRHEA: 0

## 2019-07-12 ASSESSMENT — PAIN DESCRIPTION - PAIN TYPE: TYPE: ACUTE PAIN

## 2019-07-12 ASSESSMENT — PAIN DESCRIPTION - LOCATION: LOCATION: BACK

## 2019-07-12 ASSESSMENT — PAIN DESCRIPTION - DESCRIPTORS: DESCRIPTORS: ACHING;PRESSURE

## 2019-07-12 ASSESSMENT — PAIN DESCRIPTION - PROGRESSION: CLINICAL_PROGRESSION: GRADUALLY WORSENING

## 2019-07-12 ASSESSMENT — PAIN DESCRIPTION - FREQUENCY: FREQUENCY: CONTINUOUS

## 2019-07-12 ASSESSMENT — PAIN SCALES - GENERAL
PAINLEVEL_OUTOF10: 8
PAINLEVEL_OUTOF10: 8

## 2019-07-12 NOTE — TELEPHONE ENCOUNTER
Pt presents to clinic for med pickup. Pt states 100% compliance with medications.  Pt just at ER for I&D of back abscess,

## 2019-07-12 NOTE — ED PROVIDER NOTES
[facility] ED  EMERGENCY DEPARTMENT ENCOUNTER      Pt Name: Jonnie Gunn  MRN: [id number]  [place] 1990  Date of evaluation: 7/12/2019  Provider: Meaghan Hobbs       Chief Complaint   Patient presents with    Abscess     abscess on back increasing in pressure over the last 4 days          HISTORY OF PRESENT ILLNESS   (Location/Symptom, Timing/Onset,Context/Setting, Quality, Duration, Modifying Factors, Severity)  Note limiting factors. Jonnie Gunn is a 33 y.o. male who presents to the emergency department with complaints of a reoccurring abscess to his lower back. He reports this is been previously treated with I&D as well as antibiotics. He reports symptoms have been present constantly for the last 4 days with gradual worsening. He reports the pain to the abscessed area worsens with touch and improves  not touching it as well as rest.  No fever. No change in motor or sensation. No recent procedures or injections to this area. .      Location/Symptom -abscess to lower back  Onset -4 days  Context/Setting - as above  Quality -pressure  Duration -4 days  Modifying Factors -as above  Severity -moderate        Nursing Notes were reviewed. REVIEW OF SYSTEMS    (2-9 systems for level 4, 10 or more for level 5)     Review of Systems   Constitutional: Negative for chills, diaphoresis, fatigue and fever. HENT: Negative for congestion, rhinorrhea and sore throat. Eyes: Negative for photophobia and pain. Respiratory: Negative for cough and shortness of breath. Cardiovascular: Negative for chest pain and palpitations. Gastrointestinal: Negative for abdominal pain, diarrhea, nausea and vomiting. Genitourinary: Negative for dysuria and flank pain. Musculoskeletal: Negative for back pain. Skin: Negative for rash. Abscess to lower back     Neurological: Negative for dizziness, light-headedness and headaches. Psychiatric/Behavioral: Negative.

## 2019-07-14 LAB
ANAEROBIC CULTURE: ABNORMAL
GRAM STAIN RESULT: ABNORMAL
ORGANISM: ABNORMAL
WOUND/ABSCESS: ABNORMAL

## 2019-07-16 ENCOUNTER — TELEPHONE (OUTPATIENT)
Dept: INFECTIOUS DISEASES | Age: 29
End: 2019-07-16

## 2019-07-24 ENCOUNTER — TELEPHONE (OUTPATIENT)
Dept: INFECTIOUS DISEASES | Age: 29
End: 2019-07-24

## 2019-07-30 ENCOUNTER — TELEPHONE (OUTPATIENT)
Dept: INFECTIOUS DISEASES | Age: 29
End: 2019-07-30

## 2019-07-30 NOTE — TELEPHONE ENCOUNTER
Per request, CM scheduled transport to Support Group meeting 8/13/19 @ 1PM.  CM assessed need, made arrangements as last resort.

## 2019-08-01 ENCOUNTER — TELEPHONE (OUTPATIENT)
Dept: INFECTIOUS DISEASES | Age: 29
End: 2019-08-01

## 2019-08-01 NOTE — TELEPHONE ENCOUNTER
Pt called in wanting to discuss up coming support group. He is interested in coming. He is requesting help with transportation. Rn and pt reviewed additional transport options and assistance. It is determined he will use [facility] as last report for help. Discussed transport policy. Discussed need to call 2 hours prior to apt time for cancellation. Pt verbalized all understanding. [facility] notified. Pt aware transport will be there for  about 30 mins prior to apt.      All appts are related to medical treatment and are necessary for compliance    Support Group 8-13-19 1-3 PM [facility] 2

## 2019-09-04 ENCOUNTER — NURSE ONLY (OUTPATIENT)
Dept: INFECTIOUS DISEASES | Age: 29
End: 2019-09-04

## 2019-09-04 ENCOUNTER — TELEPHONE (OUTPATIENT)
Dept: INFECTIOUS DISEASES | Age: 29
End: 2019-09-04

## 2019-09-04 DIAGNOSIS — R19.7 ACUTE DIARRHEA: Primary | ICD-10-CM

## 2019-09-04 DIAGNOSIS — B20 HIV INFECTION, UNSPECIFIED SYMPTOM STATUS (HCC): Primary | ICD-10-CM

## 2019-09-04 DIAGNOSIS — B20 HIV INFECTION, UNSPECIFIED SYMPTOM STATUS (HCC): ICD-10-CM

## 2019-09-04 LAB
ALBUMIN SERPL-MCNC: 4.4 G/DL (ref 3.5–4.6)
ALP BLD-CCNC: 59 U/L (ref 35–104)
ALT SERPL-CCNC: 26 U/L (ref 0–41)
ANION GAP SERPL CALCULATED.3IONS-SCNC: 15 MEQ/L (ref 9–15)
AST SERPL-CCNC: 29 U/L (ref 0–40)
BILIRUB SERPL-MCNC: <0.2 MG/DL (ref 0.2–0.7)
BUN BLDV-MCNC: 13 MG/DL (ref 6–20)
CALCIUM SERPL-MCNC: 8.8 MG/DL (ref 8.5–9.9)
CHLORIDE BLD-SCNC: 107 MEQ/L (ref 95–107)
CHOLESTEROL, TOTAL: 169 MG/DL (ref 0–199)
CO2: 22 MEQ/L (ref 20–31)
CREAT SERPL-MCNC: 0.95 MG/DL (ref 0.7–1.2)
GFR AFRICAN AMERICAN: >60
GFR NON-AFRICAN AMERICAN: >60
GLOBULIN: 3.5 G/DL (ref 2.3–3.5)
GLUCOSE BLD-MCNC: 94 MG/DL (ref 70–99)
HCT VFR BLD CALC: 42.9 % (ref 42–52)
HDLC SERPL-MCNC: 34 MG/DL (ref 40–59)
HEMOGLOBIN: 14.3 G/DL (ref 14–18)
HEPATITIS C ANTIBODY INTERPRETATION: NORMAL
LDL CHOLESTEROL CALCULATED: 75 MG/DL (ref 0–129)
MCH RBC QN AUTO: 31.2 PG (ref 27–31.3)
MCHC RBC AUTO-ENTMCNC: 33.2 % (ref 33–37)
MCV RBC AUTO: 93.9 FL (ref 80–100)
PDW BLD-RTO: 13.7 % (ref 11.5–14.5)
PLATELET # BLD: 164 K/UL (ref 130–400)
POTASSIUM SERPL-SCNC: 3.7 MEQ/L (ref 3.4–4.9)
RBC # BLD: 4.57 M/UL (ref 4.7–6.1)
SODIUM BLD-SCNC: 144 MEQ/L (ref 135–144)
T4 TOTAL: 5.7 UG/DL (ref 4.5–11.7)
TOTAL PROTEIN: 7.9 G/DL (ref 6.3–8)
TRIGL SERPL-MCNC: 302 MG/DL (ref 0–150)
WBC # BLD: 3.6 K/UL (ref 4.8–10.8)

## 2019-09-04 NOTE — PROGRESS NOTES
RN Intake      Patient ID:   René Jaimes  Date:   9/4/2019      Client ID:    M[address]uel [address]  [place] [address]  [phone] (home)   [x] OK to leave voicemail     Family/House:    [] Partner  [] Children  [x] Other - alone  Housing:   apartment    Transport:   public transportation or he walks  Discussed help with transport only as last resort. Employment:  No currently working. Gets SSI wants to find part time job. Mental Health: Follows with [facility]. Enjoys it there. States it has helped a lot. Discussed referral if needed.   Substance Abuse:   Social History     Socioeconomic History    Marital status: Single     Spouse name: Not on file    Number of children: Not on file    Years of education: Not on file    Highest education level: Not on file   Occupational History    Not on file   Social Needs    Financial resource strain: Not on file    Food insecurity:     Worry: Not on file     Inability: Not on file    Transportation needs:     Medical: Not on file     Non-medical: Not on file   Tobacco Use    Smoking status: Never Smoker    Smokeless tobacco: Never Used   Substance and Sexual Activity    Alcohol use: No    Drug use: Yes     Types: Marijuana     Comment: smoked 2 weeks ago    Sexual activity: Not on file   Lifestyle    Physical activity:     Days per week: Not on file     Minutes per session: Not on file    Stress: Not on file   Relationships    Social connections:     Talks on phone: Not on file     Gets together: Not on file     Attends Anabaptist service: Not on file     Active member of club or organization: Not on file     Attends meetings of clubs or organizations: Not on file     Relationship status: Not on file    Intimate partner violence:     Fear of current or ex partner: Not on file     Emotionally abused: Not on file     Physically abused: Not on file     Forced sexual activity: Not on file   Other Topics Concern    Not on file   Social History Narrative [employer]  1    Abacavir-Dolutegravir-Lamivud (TRIUMEQ) 600- MG TABS Take by mouth daily      neomycin-bacitracin-polymyxin (NEOSPORIN) 400-5-5000 ointment Apply topically 2 times daily. 30 g 1    ibuprofen (ADVIL;MOTRIN) 800 MG tablet Take 1 tablet by mouth every 8 hours as needed for Pain or Fever 15 tablet 0     No current facility-administered medications on file prior to visit. Reviewed and confirmed with pt. History of HIV Medications or current regimen:   Triumeq  Tolerating well. Denies misses or problems with medication. Pharmacy:      [facility] [address], [address] [address]  Phone: [phone] Fax: [phone]    No changes     Legal Issues:    NA    Emergency Contact:   Extended Emergency Contact Information  Primary Emergency Contact: Melinda Burkitt [address] Phone: [phone]  Relation: Parent  Secondary Emergency Contact: Lori Castellanos [address] Phone: [phone]  Relation: Brother/Sister  Confirmed no changes  Support System:  Really only brother. Some friends. Enjoys support group. He will be attending. Rn and pt reviewed additional transport options and assistance. It is determined he will use [facility] as last report for help. Denies problems or concerns with support system. RN Comments:  Stool study supplies given and explained at length. Ordered per Dr Ronal Figueredo. Spoke with pt regarding importance of completing study asap. States he will only be able to complete study Monday due to transport and plans. Discussed [facility] transport. Still pt states Monday is when he can go to lab. Rn and pt reviewed additional transport options and assistance. It is determined he will use [facility] as last report for help. Reviewed other orders from Dr Ronal Figueredo--     Discussed transport policy.  Discussed need to call 2 hours prior to apt time for

## 2019-09-05 ENCOUNTER — TELEPHONE (OUTPATIENT)
Dept: INFECTIOUS DISEASES | Age: 29
End: 2019-09-05

## 2019-09-06 LAB
QUANTI TB GOLD PLUS: NEGATIVE
QUANTI TB1 MINUS NIL: 0 IU/ML (ref 0–0.34)
QUANTI TB2 MINUS NIL: 0 IU/ML (ref 0–0.34)
QUANTIFERON MITOGEN: 8.64 IU/ML
QUANTIFERON NIL: 0.05 IU/ML
RPR TITER: NORMAL
RPR: REACTIVE

## 2019-09-07 LAB
HIV-1 QNT LOG, IU/ML: <1.47 LOG CPY/ML
HIV-1 QNT, IU/ML: ABNORMAL CPY/ML
INTERPRETATION: DETECTED

## 2019-09-08 LAB — TREPONEMA PALLIDUM ANTIBODIES: REACTIVE

## 2019-09-09 ENCOUNTER — TELEPHONE (OUTPATIENT)
Dept: INFECTIOUS DISEASES | Age: 29
End: 2019-09-09

## 2019-09-09 NOTE — TELEPHONE ENCOUNTER
Pt called in stating he does not feel like he needs to go to [facility] for stool sample anymore. He denies having diarrhea at all anymore. Discussed that this is considered a no show as transport was on the way to get him. He was apologetic. Reviewed transport policy again as well. Notified Dr Farhat Hernadez. [place] order for vanco/stool studies.

## 2019-09-10 LAB
C. TRACHOMATIS DNA ,URINE: NEGATIVE
N. GONORRHOEAE DNA, URINE: NEGATIVE

## 2019-09-11 ENCOUNTER — TELEPHONE (OUTPATIENT)
Dept: INFECTIOUS DISEASES | Age: 29
End: 2019-09-11

## 2019-09-11 DIAGNOSIS — B20 HIV INFECTION, UNSPECIFIED SYMPTOM STATUS (HCC): Primary | ICD-10-CM

## 2019-09-11 NOTE — TELEPHONE ENCOUNTER
Pt called in stating he was able to make dental appt at [facility]. Pt is in need of help with transport. Rn and pt reviewed additional transport options and assistance. It is determined he will use [facility] as last report for help. Discussed transport policy. Discussed need to call 2 hours prior to apt time for cancellation. Pt verbalized all understanding. [facility] notified. Pt aware transport will be there for  about 30 mins prior to apt. All appts are related to medical treatment and are necessary for compliance    [facility] [address] [place] [address] 9.13.19 at 10 AM     Denies any needs at this time. Denies any issues that need Doctor attention.  Will call with concerns

## 2019-09-12 DIAGNOSIS — B20 HIV INFECTION, UNSPECIFIED SYMPTOM STATUS (HCC): ICD-10-CM

## 2019-09-14 LAB
ABSOLUTE CD 4 HELPER: 472 CELLS/UL (ref 430–1800)
CD4 % HELPER T CELL: 23 % (ref 32–64)
LYMPHOCYTE SUBSET PANEL 2 INFO: ABNORMAL

## 2019-09-16 ENCOUNTER — TELEPHONE (OUTPATIENT)
Dept: INFECTIOUS DISEASES | Age: 29
End: 2019-09-16

## 2019-09-17 ENCOUNTER — OFFICE VISIT (OUTPATIENT)
Dept: INFECTIOUS DISEASES | Age: 29
End: 2019-09-17
Payer: MEDICAID

## 2019-09-17 VITALS
BODY MASS INDEX: 38.22 KG/M2 | TEMPERATURE: 97.7 F | WEIGHT: 267 LBS | SYSTOLIC BLOOD PRESSURE: 102 MMHG | RESPIRATION RATE: 16 BRPM | HEIGHT: 70 IN | DIASTOLIC BLOOD PRESSURE: 70 MMHG | HEART RATE: 50 BPM

## 2019-09-17 DIAGNOSIS — B20 HIV INFECTION, UNSPECIFIED SYMPTOM STATUS (HCC): Primary | ICD-10-CM

## 2019-09-17 PROCEDURE — 1036F TOBACCO NON-USER: CPT | Performed by: INTERNAL MEDICINE

## 2019-09-17 PROCEDURE — G8417 CALC BMI ABV UP PARAM F/U: HCPCS | Performed by: INTERNAL MEDICINE

## 2019-09-17 PROCEDURE — 99213 OFFICE O/P EST LOW 20 MIN: CPT | Performed by: INTERNAL MEDICINE

## 2019-09-17 PROCEDURE — G8427 DOCREV CUR MEDS BY ELIG CLIN: HCPCS | Performed by: INTERNAL MEDICINE

## 2019-09-17 ASSESSMENT — ENCOUNTER SYMPTOMS
ABDOMINAL PAIN: 0
SHORTNESS OF BREATH: 0
GASTROINTESTINAL NEGATIVE: 1
NAUSEA: 0
EYES NEGATIVE: 1
VOMITING: 0
COUGH: 0
DIARRHEA: 0
RESPIRATORY NEGATIVE: 1

## 2019-09-17 NOTE — PROGRESS NOTES
Routine follow up today. Pt doing well. Denies problems with meds, pharmacy or insurance. Aware of transport policy, 1 no show this month. requesting help with basic needs. Requesting food/gas voucher  Needs assessed. 1 food voucher given to pt. Discussed diet changes. Doing better no diarrhea. Feels much better. Denies other needs at this time and will call with concerns and/or updates      Denies any needs at this time. Denies any issues that need Doctor attention. Will call with concerns      Per Dr Narcisa Hull pt is to be retreated for + RPR. 3 doses Bicillin weekly. Scheduled and pt will need help with transport. Rn and pt reviewed additional transport options and assistance. It is determined he will use [facility] as last report for help. [facility] notified. Pt aware transport will be there for  about 30 mins prior to apt. All appts are related to medical treatment and are necessary for compliance     Appt: 9.18.19 10 am --- 9.25.19 10 am --- 10.2.19 10 am all at [facility] ID    Denies any needs at this time. Denies any issues that need Doctor attention.  Will call with concerns
Report using either link below:     -Direct access: [email or web address]     -Enter Username, Password: [email or web address]    Username: [email or web address]    Password: [email or web address]   Performed by [facility] [phone]   www. Joanna Harrison MD - Lab.  Director    HIV-1 Genotype 01/09/2019  2:37 PM [facility]   See Note    Comment:     Drug Resistance:   NRTI Drug Class       VIDEX, (didanosine, ddI)                             None     VIREAD, (tenofovir, TDF)                             None     ZERIT, (stavudine, d4T)                              None     ZIAGEN, (abacavir, ABC)                              None     EMTRIVA, (emtricitabine, FTC)                        None     RETROVIR, (zidovudine, ZDV)                          None     EPIVIR, (lamivudine, 3TC)                            None       NRTI drug resistance mutations identified: None     NNRTI Drug Class       SUSTIVA, (efavirenz, EFV)                            None     VIRAMUNE, (nevirapine, NVP)                          None     INTELENCE, (etravirine, ETR)                         None     EDURANT, (rilpivirine, RPV)                          None       NNRTI drug resistance mutations identified: [id number]     PI+ Drug Class       VIRACEPT, (nelfinavir, NFV)                          None     APTIVUS, (tipranavir, TPV)                           None     CRIXIVAN, (indinavir, IDV)                           None     KALETRA, (lopinavir + ritonavir, LPV)                None     REYATAZ, (atazanavir, ATV)                           None     PREZISTA, (darunavir, DRV)                           None     LEXIVA, (fosamprenavir, FPV)                         None     FORTOVASE / INVIRASE, (saquinavir, SQV)              None       PI+ drug resistance mutations identified: None          PAST MEDICAL HISTORY      Past Medical History        Past Medical History:   Diagnosis Date    Immune deficiency
Ambulatory

## 2019-09-18 ENCOUNTER — NURSE ONLY (OUTPATIENT)
Dept: INFECTIOUS DISEASES | Age: 29
End: 2019-09-18

## 2019-09-18 DIAGNOSIS — B20 HIV INFECTION, UNSPECIFIED SYMPTOM STATUS (HCC): Primary | ICD-10-CM

## 2019-09-18 NOTE — PROGRESS NOTES
Pt into clinic today for bicillin injection. First of 3 doses. He has had this treatment in the past with out any problems or concerns. .After obtaining consent, and per orders of Dr. Rita Edmond, injection of Bicillin given in Left and Right upper quad. gluteus by Gino Wolf. Patient instructed to remain in clinic for 20 minutes afterwards,   No adverse reaction noted. Patient tolerated well. Patient to call office or go to ER to report any adverse reaction immediately. Denies any concerns that need to be addressed with Doctor today.

## 2019-09-25 ENCOUNTER — TELEPHONE (OUTPATIENT)
Dept: INFECTIOUS DISEASES | Age: 29
End: 2019-09-25

## 2019-09-26 ENCOUNTER — NURSE ONLY (OUTPATIENT)
Dept: INFECTIOUS DISEASES | Age: 29
End: 2019-09-26

## 2019-09-26 DIAGNOSIS — B20 HIV INFECTION, UNSPECIFIED SYMPTOM STATUS (HCC): Primary | ICD-10-CM

## 2019-10-02 ENCOUNTER — TELEPHONE (OUTPATIENT)
Dept: INFECTIOUS DISEASES | Age: 29
End: 2019-10-02

## 2019-10-02 ENCOUNTER — NURSE ONLY (OUTPATIENT)
Dept: INFECTIOUS DISEASES | Age: 29
End: 2019-10-02

## 2019-10-08 ENCOUNTER — TELEPHONE (OUTPATIENT)
Dept: INFECTIOUS DISEASES | Age: 29
End: 2019-10-08

## 2019-10-22 ENCOUNTER — NURSE ONLY (OUTPATIENT)
Dept: INFECTIOUS DISEASES | Age: 29
End: 2019-10-22

## 2019-10-22 DIAGNOSIS — B20 HUMAN IMMUNODEFICIENCY VIRUS (HCC): Primary | ICD-10-CM

## 2019-10-28 ENCOUNTER — TELEPHONE (OUTPATIENT)
Dept: INFECTIOUS DISEASES | Age: 29
End: 2019-10-28

## 2019-11-18 ENCOUNTER — TELEPHONE (OUTPATIENT)
Dept: INFECTIOUS DISEASES | Age: 29
End: 2019-11-18

## 2019-11-26 ENCOUNTER — TELEPHONE (OUTPATIENT)
Dept: INFECTIOUS DISEASES | Age: 29
End: 2019-11-26

## 2019-12-04 ENCOUNTER — TELEPHONE (OUTPATIENT)
Dept: INFECTIOUS DISEASES | Age: 29
End: 2019-12-04

## 2019-12-05 ENCOUNTER — NURSE ONLY (OUTPATIENT)
Dept: INFECTIOUS DISEASES | Age: 29
End: 2019-12-05

## 2019-12-05 DIAGNOSIS — B20 HUMAN IMMUNODEFICIENCY VIRUS (HCC): Primary | ICD-10-CM

## 2019-12-05 DIAGNOSIS — B20 HIV INFECTION, UNSPECIFIED SYMPTOM STATUS (HCC): ICD-10-CM

## 2019-12-05 DIAGNOSIS — B20 HIV (HUMAN IMMUNODEFICIENCY VIRUS INFECTION) (HCC): ICD-10-CM

## 2019-12-05 LAB
CHOLESTEROL, TOTAL: 210 MG/DL (ref 0–199)
HDLC SERPL-MCNC: 51 MG/DL (ref 40–59)
HEPATITIS C ANTIBODY INTERPRETATION: NORMAL
LDL CHOLESTEROL CALCULATED: 134 MG/DL (ref 0–129)
TRIGL SERPL-MCNC: 123 MG/DL (ref 0–150)

## 2019-12-06 LAB
RPR TITER: NORMAL
RPR: REACTIVE

## 2019-12-08 LAB
QUANTI TB GOLD PLUS: NEGATIVE
QUANTI TB1 MINUS NIL: 0 IU/ML (ref 0–0.34)
QUANTI TB2 MINUS NIL: 0 IU/ML (ref 0–0.34)
QUANTIFERON MITOGEN: 8.12 IU/ML
QUANTIFERON NIL: 0.09 IU/ML

## 2019-12-09 LAB
ALBUMIN SERPL-MCNC: 4.7 G/DL (ref 3.5–4.6)
ALP BLD-CCNC: 59 U/L (ref 35–104)
ALT SERPL-CCNC: 25 U/L (ref 0–41)
ANION GAP SERPL CALCULATED.3IONS-SCNC: 20 MEQ/L (ref 9–15)
AST SERPL-CCNC: 32 U/L (ref 0–40)
BILIRUB SERPL-MCNC: <0.2 MG/DL (ref 0.2–0.7)
BUN BLDV-MCNC: 16 MG/DL (ref 6–20)
CALCIUM SERPL-MCNC: 9.6 MG/DL (ref 8.5–9.9)
CHLORIDE BLD-SCNC: 103 MEQ/L (ref 95–107)
CO2: 20 MEQ/L (ref 20–31)
CREAT SERPL-MCNC: 0.87 MG/DL (ref 0.7–1.2)
GFR AFRICAN AMERICAN: >60
GFR NON-AFRICAN AMERICAN: >60
GLOBULIN: 3.8 G/DL (ref 2.3–3.5)
GLUCOSE BLD-MCNC: 71 MG/DL (ref 70–99)
POTASSIUM SERPL-SCNC: 4.6 MEQ/L (ref 3.4–4.9)
SODIUM BLD-SCNC: 143 MEQ/L (ref 135–144)
TOTAL PROTEIN: 8.5 G/DL (ref 6.3–8)

## 2019-12-10 ENCOUNTER — TELEPHONE (OUTPATIENT)
Dept: INFECTIOUS DISEASES | Age: 29
End: 2019-12-10

## 2019-12-11 ENCOUNTER — TELEPHONE (OUTPATIENT)
Dept: INFECTIOUS DISEASES | Age: 29
End: 2019-12-11

## 2019-12-18 ENCOUNTER — TELEPHONE (OUTPATIENT)
Dept: INFECTIOUS DISEASES | Age: 29
End: 2019-12-18

## 2019-12-30 ENCOUNTER — TELEPHONE (OUTPATIENT)
Dept: INFECTIOUS DISEASES | Age: 29
End: 2019-12-30

## 2020-01-06 ENCOUNTER — TELEPHONE (OUTPATIENT)
Dept: INFECTIOUS DISEASES | Age: 30
End: 2020-01-06

## 2020-01-06 NOTE — TELEPHONE ENCOUNTER
Call placed to pt to discuss up coming support group. Date 1.14.2020    Discussed upcoming HIV support group. Is interested in coming. Pt is in need of help with transportation  Rn and pt reviewed additional transport options and assistance. It is determined he will use [facility] as last report for help. [facility] notified. Pt aware transport will be there for  about 30 mins prior to apt.     1.14.2020 at 1 PM [facility] 2    Denies questions and will call with concerns. Denies any needs at this time. Denies any issues that need Doctor attention.  Will call with concerns

## 2020-01-08 ENCOUNTER — NURSE ONLY (OUTPATIENT)
Dept: INFECTIOUS DISEASES | Age: 30
End: 2020-01-08

## 2020-01-08 DIAGNOSIS — B20 HIV INFECTION, UNSPECIFIED SYMPTOM STATUS (HCC): ICD-10-CM

## 2020-01-08 LAB
ALBUMIN SERPL-MCNC: 4.6 G/DL (ref 3.5–4.6)
ALP BLD-CCNC: 68 U/L (ref 35–104)
ALT SERPL-CCNC: 20 U/L (ref 0–41)
ANION GAP SERPL CALCULATED.3IONS-SCNC: 14 MEQ/L (ref 9–15)
AST SERPL-CCNC: 25 U/L (ref 0–40)
BILIRUB SERPL-MCNC: <0.2 MG/DL (ref 0.2–0.7)
BUN BLDV-MCNC: 15 MG/DL (ref 6–20)
CALCIUM SERPL-MCNC: 9.7 MG/DL (ref 8.5–9.9)
CHLORIDE BLD-SCNC: 102 MEQ/L (ref 95–107)
CO2: 26 MEQ/L (ref 20–31)
CREAT SERPL-MCNC: 0.84 MG/DL (ref 0.7–1.2)
GFR AFRICAN AMERICAN: >60
GFR NON-AFRICAN AMERICAN: >60
GLOBULIN: 3.8 G/DL (ref 2.3–3.5)
GLUCOSE BLD-MCNC: 65 MG/DL (ref 70–99)
HCT VFR BLD CALC: 43.3 % (ref 42–52)
HEMOGLOBIN: 14.7 G/DL (ref 14–18)
MCH RBC QN AUTO: 31.6 PG (ref 27–31.3)
MCHC RBC AUTO-ENTMCNC: 33.9 % (ref 33–37)
MCV RBC AUTO: 93.2 FL (ref 80–100)
PDW BLD-RTO: 13.1 % (ref 11.5–14.5)
PLATELET # BLD: 165 K/UL (ref 130–400)
POTASSIUM SERPL-SCNC: 3.7 MEQ/L (ref 3.4–4.9)
RBC # BLD: 4.64 M/UL (ref 4.7–6.1)
SODIUM BLD-SCNC: 142 MEQ/L (ref 135–144)
TOTAL PROTEIN: 8.4 G/DL (ref 6.3–8)
WBC # BLD: 3.9 K/UL (ref 4.8–10.8)

## 2020-01-09 ENCOUNTER — TELEPHONE (OUTPATIENT)
Dept: INFECTIOUS DISEASES | Age: 30
End: 2020-01-09

## 2020-01-10 LAB
ABSOLUTE CD 4 HELPER: 471 CELLS/UL (ref 430–1800)
CD4 % HELPER T CELL: 24 % (ref 32–64)
LYMPHOCYTE SUBSET PANEL 2 INFO: ABNORMAL

## 2020-01-11 LAB
HIV-1 QNT LOG, IU/ML: <1.47 LOG CPY/ML
HIV-1 QNT, IU/ML: ABNORMAL CPY/ML
INTERPRETATION: DETECTED

## 2020-01-16 ENCOUNTER — OFFICE VISIT (OUTPATIENT)
Dept: INFECTIOUS DISEASES | Age: 30
End: 2020-01-16
Payer: MEDICAID

## 2020-01-16 VITALS
TEMPERATURE: 98.9 F | SYSTOLIC BLOOD PRESSURE: 118 MMHG | HEIGHT: 70 IN | DIASTOLIC BLOOD PRESSURE: 65 MMHG | RESPIRATION RATE: 16 BRPM | BODY MASS INDEX: 37.42 KG/M2 | WEIGHT: 261.4 LBS | HEART RATE: 59 BPM

## 2020-01-16 PROCEDURE — 99213 OFFICE O/P EST LOW 20 MIN: CPT | Performed by: INTERNAL MEDICINE

## 2020-01-16 PROCEDURE — G8427 DOCREV CUR MEDS BY ELIG CLIN: HCPCS | Performed by: INTERNAL MEDICINE

## 2020-01-16 PROCEDURE — G8484 FLU IMMUNIZE NO ADMIN: HCPCS | Performed by: INTERNAL MEDICINE

## 2020-01-16 PROCEDURE — 1036F TOBACCO NON-USER: CPT | Performed by: INTERNAL MEDICINE

## 2020-01-16 PROCEDURE — G8417 CALC BMI ABV UP PARAM F/U: HCPCS | Performed by: INTERNAL MEDICINE

## 2020-01-16 ASSESSMENT — ENCOUNTER SYMPTOMS
COUGH: 0
SHORTNESS OF BREATH: 0
ABDOMINAL PAIN: 0
EYES NEGATIVE: 1
NAUSEA: 0
GASTROINTESTINAL NEGATIVE: 1
RESPIRATORY NEGATIVE: 1
VOMITING: 0
DIARRHEA: 0

## 2020-01-16 NOTE — PROGRESS NOTES
Subjective:      Patient ID: Starr Hoover is a 26 y.o. male. HPI              Component Collected Lab   RPR TITER 12/05/2019  3:34 PM [address] Lab   1:8        TREPONEMA PALLIDUM ANTIBODIES ReactiveAbnormal   Non Reactive Final 09/06/2019  2:32 PM [facility]   9/6/2019 10:42 AM - Ady, Chpo Incoming Lab Results From Soft     Component Collected Lab   RPR TITER 09/04/2019 11:57 AM [facility] - [facility] Lab   1:16      RPR TITER 01/09/2019  2:37 PM [facility] - [facility] Lab   1:16              Gram Stain Result Abnormal  05/03/2019 10:03 AM [address] [facility]   Moderate WBC's   Moderate Gram positive cocci in pairs    WOUND/ABSCESS 05/03/2019 10:03 AM [facility]   No growth 48 hours    Organism Abnormal  05/03/2019 10:03 AM [facility] - [facility]   Anaerobic gram positive cocci    Anaerobic Culture 05/03/2019 10:03 AM [facility] - [facility]   Light growth   Sensitivities not routinely done. Drugs of choice are:   Penicillin G, Metronidazole, Clindamycin or   Piperacillin/Tazobactam.          HIV   ON TRIMEQ X 5-6 YEARS  HX OF non compliance          CD4 % [place] T Cell 24Low   32 - 64 % Final 01/08/2020  3:34 PM [facility]   Absolute CD 4 [place] 471  430 - 1800 cells/uL Final 01/08/2020  3:34 PM          CD4 % [place] T Cell 23Low   32 - 64 % Final 09/12/2019  9:55 AM [facility]   Absolute CD 4 [place] 472  430 - 1800 cells/uL Final 09/12/2019  9:55 AM [facility]       CD4 % [place] T Cell 18Low   32 - 64 % Final 03/06/2019  3:44 PM [facility]   Absolute CD 4 [place] 413Low   430 - 1800 cells/uL Final 03/06/2019  3:44 PM [facility]                 HIV-1 QNT, IU/ML <30 Detected  cpy/mL Final 01/08/2020  3:34 PM [facility]   HIV-1 QNT Log, IU/ML <1.47  log cpy/mL Final 01/08/2020  3:34 PM          HIV-1 QNT, IU/ML <30 Detected  cpy/mL Final 09/04/2019 11:57 AM [facility]   HIV-1 QNT Log, IU/ML <1.47  log cpy/mL Final 09/04/2019 11:57 AM [facility]   HIV-1 RNA detected, but at a level below 30 copies/mL.  HIV-1 RNA   concentration is below the present. Musculoskeletal:         General: No tenderness. Lymphadenopathy:      Cervical: No cervical adenopathy. Skin:     General: Skin is warm. Neurological:      Mental Status: He is alert. Cranial Nerves: No cranial nerve deficit. Coordination: Coordination normal.         Assessment:       Diagnosis Orders   1. HIV infection, unspecified symptom status ([facility]     2. Human immunodeficiency virus ([facility]        Diagnosis Orders   1. HIV infection, unspecified symptom status ([facility]     2.  Human immunodeficiency virus ([facility]       Late latent syphilis      Plan:        HIV stable   TRIMEQ      Syphilis titer over the past year is gone from 1-16 to 1-8 which is no different which indicates patient did not have active syphilis in January 2019             Dougie Schmidt MD

## 2020-01-16 NOTE — PROGRESS NOTES
Routine follow up. Pt requested information regarding hormone replacement and treatment. [facility] Information reviewed and given to pt. Doing ok otherwise. States he is now 100% with triumeq. Did miss doses prior to last lab appt. Denies any issues. States headache is gone and feels fine. Discussed importance of compliance. He is  requesting help with basic needs. Requesting food/gas voucher  Needs assessed. 2 food vouchers given to pt. Denies any needs at this time. Denies any issues that need Doctor attention. Will call with concerns and updates.

## 2020-01-21 ENCOUNTER — TELEPHONE (OUTPATIENT)
Dept: INFECTIOUS DISEASES | Age: 30
End: 2020-01-21

## 2020-01-23 ENCOUNTER — TELEPHONE (OUTPATIENT)
Dept: INFECTIOUS DISEASES | Age: 30
End: 2020-01-23

## 2020-01-24 ENCOUNTER — NURSE ONLY (OUTPATIENT)
Dept: INFECTIOUS DISEASES | Age: 30
End: 2020-01-24

## 2020-01-28 ENCOUNTER — TELEPHONE (OUTPATIENT)
Dept: INFECTIOUS DISEASES | Age: 30
End: 2020-01-28

## 2020-01-28 NOTE — TELEPHONE ENCOUNTER
Per request, CM scheduled transport to appointment with [facility] for counseling today at 10:30AM.  Cm assessed need, made arrangements as last resort.

## 2020-02-12 ENCOUNTER — TELEPHONE (OUTPATIENT)
Dept: INFECTIOUS DISEASES | Age: 30
End: 2020-02-12

## 2020-02-13 ENCOUNTER — TELEPHONE (OUTPATIENT)
Dept: INFECTIOUS DISEASES | Age: 30
End: 2020-02-13

## 2020-02-20 ENCOUNTER — TELEPHONE (OUTPATIENT)
Dept: INFECTIOUS DISEASES | Age: 30
End: 2020-02-20

## 2020-02-25 ENCOUNTER — TELEPHONE (OUTPATIENT)
Dept: INFECTIOUS DISEASES | Age: 30
End: 2020-02-25

## 2020-03-16 ENCOUNTER — TELEPHONE (OUTPATIENT)
Dept: INFECTIOUS DISEASES | Age: 30
End: 2020-03-16

## 2020-03-16 NOTE — TELEPHONE ENCOUNTER
Call placed to pt regarding cancellation of this months support group 3.31.2020    Pt has questions regarding  Coronavirus. CDC recommendations reviewed. Reviewed labs with pt. Labs are stable. Discussed next months group date 4.28.2020. Denies problems. Discussed with pt to call if problems occur before appt or for further questions/concerns. He verbalized understanding. Denies any needs at this time. Denies any issues that need Doctor attention.  Will call with concerns

## 2020-03-24 ENCOUNTER — TELEPHONE (OUTPATIENT)
Dept: INFECTIOUS DISEASES | Age: 30
End: 2020-03-24

## 2020-05-14 ENCOUNTER — TELEPHONE (OUTPATIENT)
Dept: INFECTIOUS DISEASES | Age: 30
End: 2020-05-14

## 2020-05-20 ENCOUNTER — NURSE ONLY (OUTPATIENT)
Dept: INFECTIOUS DISEASES | Age: 30
End: 2020-05-20

## 2020-06-12 ENCOUNTER — TELEPHONE (OUTPATIENT)
Dept: INFECTIOUS DISEASES | Age: 30
End: 2020-06-12

## 2020-06-15 ENCOUNTER — TELEPHONE (OUTPATIENT)
Dept: INFECTIOUS DISEASES | Age: 30
End: 2020-06-15

## 2020-06-15 NOTE — TELEPHONE ENCOUNTER
Pt called in needing help with transport for upcoming appt. Rn and pt reviewed additional transport options and assistance. It is determined he will use [facility] as last report for help.     she is aware of policy and that  time will be 30 mins prior to apt time. [facility] notified. All appts are related to medical treatment and are necessary for compliance    Appt:   [facility] apt 6.16.2020 at 11 am    Denies any needs at this time. Denies any issues that need Doctor attention.  Will call with concerns

## 2020-07-09 ENCOUNTER — TELEPHONE (OUTPATIENT)
Dept: INFECTIOUS DISEASES | Age: 30
End: 2020-07-09

## 2020-07-09 NOTE — TELEPHONE ENCOUNTER
Spoke with pt due to changes in clinic due to COVID 19. Discussed that Dr Jayme Roman has changed his 7.15.2020 for V/V. Patient was informed that our practice is making every effort to adhere to current recommendations, including social distancing. For the health and safety of our patients, and to prevent unnecessary exposure, we are currently scheduling video appointments. Patient was informed that these appointments are official appointments and will be billed through patient's insurance. Patient confirms this and agreed to the video visit. He did verbalize understanding of clinic changes due to COVID 19. Reviewed if patient has smart phone and or computer --- prefers-- android [phone]  Explained that ID office will call  piror to apt time and send Doxy link for apt. He verbalized understanding. Pt states he has apt with [facility] 7.15.2020   Rn and pt reviewed additional transport options and assistance. It is determined he will use [facility] as last report for help. Aware of policy and  time. [facility] notified. All appts are related to medical treatment and are necessary for compliance    Appt:   7.15.2020 [facility]  1130. Denies any needs at this time. Denies any issues that need Doctor attention.  Will call with concerns

## 2020-07-15 ENCOUNTER — VIRTUAL VISIT (OUTPATIENT)
Dept: INFECTIOUS DISEASES | Age: 30
End: 2020-07-15
Payer: MEDICAID

## 2020-07-15 ENCOUNTER — TELEPHONE (OUTPATIENT)
Dept: INFECTIOUS DISEASES | Age: 30
End: 2020-07-15

## 2020-07-15 PROCEDURE — 1036F TOBACCO NON-USER: CPT | Performed by: INTERNAL MEDICINE

## 2020-07-15 PROCEDURE — G8428 CUR MEDS NOT DOCUMENT: HCPCS | Performed by: INTERNAL MEDICINE

## 2020-07-15 PROCEDURE — G8417 CALC BMI ABV UP PARAM F/U: HCPCS | Performed by: INTERNAL MEDICINE

## 2020-07-15 PROCEDURE — 99213 OFFICE O/P EST LOW 20 MIN: CPT | Performed by: INTERNAL MEDICINE

## 2020-07-15 ASSESSMENT — ENCOUNTER SYMPTOMS
RESPIRATORY NEGATIVE: 1
SHORTNESS OF BREATH: 0
COUGH: 0
DIARRHEA: 0
NAUSEA: 0
GASTROINTESTINAL NEGATIVE: 1
ABDOMINAL PAIN: 0
VOMITING: 0
CONSTIPATION: 0

## 2020-07-15 NOTE — PROGRESS NOTES
Subjective:      Patient ID: Ana Barraza is a 26 y.o. male. Ana Barraza is a 26 y.o. male being evaluated by a Virtual Visit (video visit) encounter to address concerns as mentioned above. A caregiver was present when appropriate. Due to this being a TeleHealth encounter (During [facility]- public health emergency), evaluation of the following organ systems was limited: Vitals/Constitutional/EENT/Resp/CV/GI//MS/Neuro/Skin/Heme-Lymph-Imm. Pursuant to the emergency declaration under the [address], [address] [place] [address] authority and the [employer] Act, this Virtual Visit was conducted with patient's (and/or legal guardian's) consent, to reduce the patient's risk of exposure to COVID-19 and provide necessary medical care. The patient (and/or legal guardian) has also been advised to contact this office for worsening conditions or problems, and seek emergency medical treatment and/or call 911 if deemed necessary. Patient identification was verified at the start of the visit: Yes    Total time spent for this encounter: [address] were provided through a video synchronous discussion virtually to substitute for in-person clinic visit. Patient and provider were located at their individual homes. --Aliza Santa MD on 7/15/2020 at 10:49 AM    An electronic signature was used to authenticate this note.         HPI  No fevers chills sweats nausea vomiting diarrhea chest pain heaviness shortness of breath no difficulty with medications appetite is good      Component Collected Lab   RPR TITER 12/05/2019  3:34 PM [facility]   1:8        TREPONEMA PALLIDUM ANTIBODIES ReactiveAbnormal   Non Reactive Final 09/06/2019  2:32 PM [facility]   9/6/2019 10:42 AM - Sergei Garsia Incoming Lab Results From Soft     Component Collected Lab   RPR TITER 09/04/2019 11:57 AM [facility]   1:16      RPR TITER 01/09/2019  2:37 PM [address] Lab   1:16      RPR TITER  06/30/2020 11:21 AM   - [facility]    1:8              HIV   ON TRIMEQ X 5-6 YEARS  HX OF non compliance          CD4 % [place] T Cell 24Low   32 - 64 % Final 01/08/2020  3:34 PM [facility]   Absolute CD 4 [place] 471  430 - 1800 cells/uL Final 01/08/2020  3:34 PM          CD4 % [place] T Cell 23Low   32 - 64 % Final 09/12/2019  9:55 AM [facility]   Absolute CD 4 [place] 472  430 - 1800 cells/uL Final 09/12/2019  9:55 AM [facility]       CD4 % [place] T Cell 18Low   32 - 64 % Final 03/06/2019  3:44 PM [facility]   Absolute CD 4 [place] 413Low   430 - 1800 cells/uL Final 03/06/2019  3:44 PM [facility]     CD4 % [place] T Cell  26Low    32 - 64 %  Final  06/30/2020 11:21 AM  [facility]    Absolute CD 4 [place]  642  430 - 1800 cells/uL  Final  06/30/2020 11:21 AM                   HIV-1 QNT, IU/ML <30 Detected  cpy/mL Final 01/08/2020  3:34 PM [facility]   HIV-1 QNT Log, IU/ML <1.47  log cpy/mL Final 01/08/2020  3:34 PM          HIV-1 QNT, IU/ML <30 Detected  cpy/mL Final 09/04/2019 11:57 AM [facility]   HIV-1 QNT Log, IU/ML <1.47  log cpy/mL Final 09/04/2019 11:57 AM [facility]   HIV-1 RNA detected, but at a level below 30 copies/mL. HIV-1 RNA   concentration is below the Lower Limit of Quantitation of the assay. Interpretation DetectedAbnormal   Not Detected Final 09/04/2019 11:57 AM [facility]         HIV-1 QNT, IU/ML <30 Detected  cpy/mL Final 03/06/2019  3:44 PM [facility]   HIV-1 QNT Log, IU/ML <1.47  log cpy/mL Final 03/06/2019  3:44 PM [facility]     HIV-1 QNT, IU/ML  Not Detected  cpy/mL  Final  06/30/2020 11:21 AM  [facility]    HIV-1 QNT Log, IU/ML  Not Detected  log cpy/mL  Final  06/30/2020 11:21 AM  [facility]    Interpretation  Not Detected  Not Detected  Final  06/30/2020 11:21 AM                         Component Collected Lab   EER HIV Genotyping 01/09/2019  2:37 PM [facility]   See Note    Comment:   Access [facility] Enhanced Report using either link below:     -Direct access: [email or web address]     -Enter Username, Password: [email or web address]    Username: [email or web address]    Password: [email or web address]   Performed by [facility]   [place] , [address] [facility] [phone]   www. Megan Vilchis MD - Lab.  Director    HIV-1 Genotype 01/09/2019  2:37 PM [facility]   See Note    Comment:     Drug Resistance:   NRTI Drug Class       VIDEX, (didanosine, ddI)                             None     VIREAD, (tenofovir, TDF)                             None     ZERIT, (stavudine, d4T)                              None     ZIAGEN, (abacavir, ABC)                              None     EMTRIVA, (emtricitabine, FTC)                        None     RETROVIR, (zidovudine, ZDV)                          None     EPIVIR, (lamivudine, 3TC)                            None       NRTI drug resistance mutations identified: None     NNRTI Drug Class       SUSTIVA, (efavirenz, EFV)                            None     VIRAMUNE, (nevirapine, NVP)                          None     INTELENCE, (etravirine, ETR)                         None     EDURANT, (rilpivirine, RPV)                          None       NNRTI drug resistance mutations identified: V108I     PI+ Drug Class       VIRACEPT, (nelfinavir, NFV)                          None     APTIVUS, (tipranavir, TPV)                           None     CRIXIVAN, (indinavir, IDV)                           None     KALETRA, (lopinavir + ritonavir, LPV)                None     REYATAZ, (atazanavir, ATV)                           None     PREZISTA, (darunavir, DRV)                           None     LEXIVA, (fosamprenavir, FPV)                         None     FORTOVASE / INVIRASE, (saquinavir, SQV)              None       PI+ drug resistance mutations identified: None          PAST MEDICAL HISTORY      Past Medical History        Past Medical History:   Diagnosis Date    Immune deficiency disorder (HCC)       HIV not in treatment currently                 SURGICAL HISTORY     Past Surgical History History reviewed. No pertinent surgical history.           ALLERGIES     Patient has no known allergies.     FAMILY HISTORY     Family History   History reviewed. No pertinent family history.           SOCIAL HISTORY        Social History           Review of Systems   Constitutional: Negative. Negative for activity change, appetite change, chills, diaphoresis, fatigue and fever. HENT: Negative. Respiratory: Negative. Negative for cough and shortness of breath. Gastrointestinal: Negative. Negative for abdominal pain, constipation, diarrhea, nausea and vomiting. Genitourinary: Negative. Musculoskeletal: Negative. Neurological: Negative.         Objective:       Assessment:      Late latent syphilis      Plan:        HIV stable   TRIMEQ    Syphilis titer now 1-8 which is no different than 1-16 has been stable for more than a year we will continue to monitor    Follow-up 6 months             Daksha Nelson MD

## 2020-07-15 NOTE — TELEPHONE ENCOUNTER
Pt called into clinic requesting help with basic needs. Requesting food/gas voucher    Pt into clinc  Needs assessed. 2 food vouchers given to pt. Provided 2 cloth masks, hand  and cleaning wipes, RW Part A Provision, to reduce the spread of Covid-19. Denies any needs at this time. Denies any issues that need Doctor attention.  Will call with concerns

## 2020-07-17 PROBLEM — L02.212 ABSCESS OF LOWER BACK: Status: ACTIVE | Noted: 2020-07-17

## 2020-07-17 PROBLEM — Z21 ASYMPTOMATIC HIV INFECTION (HCC): Status: ACTIVE | Noted: 2020-07-17

## 2020-07-17 PROBLEM — Z86.19 H/O SYPHILIS: Status: ACTIVE | Noted: 2020-07-17

## 2020-07-17 NOTE — PROGRESS NOTES
Reviewed need for PCP with pt. He refers male. Order placed ok per Dr Jayme Roman. He will call with questions. Denies needs.  Office information given to pt as well

## 2020-08-05 ENCOUNTER — TELEPHONE (OUTPATIENT)
Dept: INFECTIOUS DISEASES | Age: 30
End: 2020-08-05

## 2020-08-05 NOTE — TELEPHONE ENCOUNTER
Call placed to pt to discuss up coming support group. Date 8.25.2020 1-3 pm    Discussed upcoming HIV support group. Is interested in coming. Pt is in need of help with transportation  Rn and pt reviewed additional transport options and assistance. It is determined he will use [facility] as last report for help. [facility] notified. Pt aware transport will be there for  about 30 mins prior to apt. Denies questions and will call with concerns. Denies any needs at this time. Denies any issues that need Doctor attention.  Will call with concerns

## 2020-08-10 ENCOUNTER — TELEPHONE (OUTPATIENT)
Dept: INFECTIOUS DISEASES | Age: 30
End: 2020-08-10

## 2020-08-10 NOTE — TELEPHONE ENCOUNTER
Pt called in needing help with transport for upcoming appt. Pt called into office in need of help with transport for up coming apt. .   Rn and pt reviewed additional transport options and assistance. It is determined he will use [facility] as last report for help. Aware of policy and  time. [facility] notified. All appts are related to medical treatment and are necessary for compliance      Appt:   [facility] 8.12.2020 at 11 am.     Denies any needs at this time. Denies any issues that need Doctor attention.  Will call with concerns

## 2020-08-12 ENCOUNTER — TELEPHONE (OUTPATIENT)
Dept: INFECTIOUS DISEASES | Age: 30
End: 2020-08-12

## 2020-09-02 ENCOUNTER — TELEPHONE (OUTPATIENT)
Dept: INFECTIOUS DISEASES | Age: 30
End: 2020-09-02

## 2020-09-02 NOTE — TELEPHONE ENCOUNTER
Per request, CM scheduled transport in order for pt to obtain needed medications. CM assessed need, to ensure compliance. Transport scheduled for 9/3/20 @ 9AM.  CM will follow up with pt as needed.

## 2020-09-14 ENCOUNTER — TELEPHONE (OUTPATIENT)
Dept: INFECTIOUS DISEASES | Age: 30
End: 2020-09-14

## 2020-09-14 NOTE — TELEPHONE ENCOUNTER
Pt called into office in need of help with transport for up coming apt. .   Pt is in need of going to [facility] and then [address] [employer] to avoid court. This is also needed as income verification for [address] [place] [address]. Rn and pt reviewed additional transport options and assistance. It is determined he will use [facility] as last report for help. Aware of policy and  time. [facility] notified. All appts are related to medical treatment and are necessary for compliance    Appt:   9.17.2020 [facility] [address]   Then to [address] of tax Mallory [address] group as well. He will call back to discuss further and if he is able to come. Denies any needs at this time. Denies any issues that need Doctor attention.  Will call with concerns

## 2020-09-15 ENCOUNTER — TELEPHONE (OUTPATIENT)
Dept: INFECTIOUS DISEASES | Age: 30
End: 2020-09-15

## 2020-09-15 NOTE — TELEPHONE ENCOUNTER
Pt called into office in need of help with transport for up coming apt. .   Rn and pt reviewed additional transport options and assistance. It is determined he will use [facility] as last report for help. Aware of policy and  time. [facility] notified. All appts are related to medical treatment and are necessary for compliance      Appt:   [facility] 9.16.2020 at 11 am     Denies any needs at this time. Denies any issues that need Doctor attention.  Will call with concerns

## 2020-09-16 ENCOUNTER — TELEPHONE (OUTPATIENT)
Dept: INFECTIOUS DISEASES | Age: 30
End: 2020-09-16

## 2020-09-16 NOTE — TELEPHONE ENCOUNTER
Pt called into clinic requesting help with basic needs. Requesting food/gas voucher    Pt into [facility]  Needs assessed. 2 COVID food vouchers given to pt. Will be going to [place] to visit family. Provided active listening as pt discussed family and neighborhood issues. Denies other needs at this time and will call with concerns and/or updates      Denies any needs at this time. Denies any issues that need Doctor attention.  Will call with concerns

## 2020-09-21 ENCOUNTER — TELEPHONE (OUTPATIENT)
Dept: INFECTIOUS DISEASES | Age: 30
End: 2020-09-21

## 2020-09-21 NOTE — TELEPHONE ENCOUNTER
Call placed to pt to discuss up coming support group. Date 9.29.2020    Discussed upcoming HIV support group. Is interested in coming. Pt is in need of help with transportation  Rn and pt reviewed additional transport options and assistance. It is determined he will use [facility] as last report for help. [facility] notified. Pt aware transport will be there for  about 30 mins prior to apt. Denies questions and will call with concerns. Denies any needs at this time. Denies any issues that need Doctor attention.  Will call with concerns

## 2020-10-07 ENCOUNTER — TELEPHONE (OUTPATIENT)
Dept: INFECTIOUS DISEASES | Age: 30
End: 2020-10-07

## 2020-10-07 RX ORDER — ABACAVIR SULFATE, DOLUTEGRAVIR SODIUM, LAMIVUDINE 600; 50; 300 MG/1; MG/1; MG/1
TABLET, FILM COATED ORAL
Qty: 30 TABLET | Refills: 5 | Status: SHIPPED | OUTPATIENT
Start: 2020-10-07 | End: 2021-03-31

## 2020-10-07 NOTE — TELEPHONE ENCOUNTER
RN Intake      Patient ID:   Rodolfo Colunga  Date:   10/7/2020      Client ID:    :    Residency:   [address] [place], [place] [address]  [address] [facility] [address]  [phone] (home)   [x] OK to leave voicemail    Family/House:    [] Partner  [] Children  [] Other -   alone  Housing:   apartment    Transport:  public transportation  Discussed help with transport only as last resort. Employment:  [employer]                                         [] Employed               [x] Not employed     Occupation:  Last date worked:     Type:      Looking for work:     Self:      Retired:     Place of employment (most recent):      Disabled/Terminated:        Mental Health:   History of mental health issues in the past.   Discussed referral if needed.   Substance Abuse:   Social History     Socioeconomic History    Marital status: Single     Spouse name: Not on file    Number of children: Not on file    Years of education: Not on file    Highest education level: Not on file   Occupational History    Not on file   Social Needs    Financial resource strain: Not on file    Food insecurity     Worry: Not on file     Inability: Not on file    Transportation needs     Medical: Not on file     Non-medical: Not on file   Tobacco Use    Smoking status: Never Smoker    Smokeless tobacco: Never Used   Substance and Sexual Activity    Alcohol use: No    Drug use: Yes     Types: Marijuana     Comment: smoked 2 weeks ago    Sexual activity: Not on file   Lifestyle    Physical activity     Days per week: Not on file     Minutes per session: Not on file    Stress: Not on file   Relationships    Social connections     Talks on phone: Not on file     Gets together: Not on file     Attends Protestant service: Not on file     Active member of club or organization: Not on file     Attends meetings of clubs or organizations: Not on file     Relationship status: Not on file    Intimate partner violence     Fear of current or ex partner: Not on file TRIUMEQ 600- MG TABS TAKE 1 TABLET BY MOUTH DAILY 30 tablet 5    sertraline (ZOLOFT) 50 MG tablet TK 1 T PO QD  1    ziprasidone (GEODON) 20 MG capsule TK 1 C PO BID WITH ZACK AND DINNER  1    neomycin-bacitracin-polymyxin (NEOSPORIN) 400-5-5000 ointment Apply topically 2 times daily. 30 g 1    ibuprofen (ADVIL;MOTRIN) 800 MG tablet Take 1 tablet by mouth every 8 hours as needed for Pain or Fever 15 tablet 0     No current facility-administered medications on file prior to visit. Reviewed and confirmed with pt. History of HIV Medications or current regimen:   Triumeq. Did miss a few days but back on track. Reviewed need for compliance. Pharmacy:      [facility] [place] [address]  Phone: [phone] Fax: [phone]    [address] [place] [address] [phone] - F [phone]  [address] [place] [address]  Phone: [phone] Fax: [phone]    No changes     Legal Issues:    NA    Emergency Contact:   Extended Emergency Contact Information  Primary Emergency Contact: Cydney Peguero   [address] Phone: [phone]  Relation: Parent  Secondary Emergency Contact: [address] Phone: [phone]  Relation: Brother/Sister  Confirmed no changes    Support System:    [x] Support group        [] Spouse       [x] Family         [] Friends       [] Clubs  [] Spiritual practices   [] Mandaen        [] Clergy         [] [employer]   [] Therapist      [] Hobbies                  [] Other -   Denies problems or concerns with support system. RN Comments:  Doing ok otherwise and denies any issues      Reviewed all upcoming apts      Updates. No changes to insurance. No changes to income. Will provide when eligibility is updated.    HIV primary care-- hospital out pt  Housing-- stable  HIV risk counseling--- yes  Mental health-- yes  Substance abuse--- yes      Support group discussed he will let office know if he can come. Denies any needs at this time. Denies any issues that need Doctor attention.  Will call with concerns

## 2020-10-14 ENCOUNTER — TELEPHONE (OUTPATIENT)
Dept: INFECTIOUS DISEASES | Age: 30
End: 2020-10-14

## 2020-10-14 NOTE — TELEPHONE ENCOUNTER
Pt called into office in need of help with transport for up coming apt. .   Rn and pt reviewed additional transport options and assistance. It is determined he will use [facility] as last report for help. Aware of policy and  time. [facility] notified. All appts are related to medical treatment and are necessary for compliance      Discussed upcoming HIV support group. Is interested in coming. Pt is in need of help with transportation      Appt: 10.15.2020 [facility] at 10 am  Support group 10.20.2020 1-3 at ID office    Denies any needs at this time. Denies any issues that need Doctor attention.  Will call with concerns

## 2020-10-15 ENCOUNTER — TELEPHONE (OUTPATIENT)
Dept: INFECTIOUS DISEASES | Age: 30
End: 2020-10-15

## 2020-10-15 NOTE — TELEPHONE ENCOUNTER
Pt called into clinic requesting help with basic needs. Requesting food/gas voucher    Pt into [facility]  Needs assessed. 2 COVID food vouchers given to pt. Denies other needs at this time and will call with concerns and/or updates      Denies any needs at this time. Denies any issues that need Doctor attention.  Will call with concerns

## 2020-10-21 ENCOUNTER — TELEPHONE (OUTPATIENT)
Dept: INFECTIOUS DISEASES | Age: 30
End: 2020-10-21

## 2020-10-21 NOTE — TELEPHONE ENCOUNTER
.   Pt presented for Support Group Meeting. Pt actively participated in discussion. Discussion was on legal services. Presenter was Melly Cazares, from [facility]. Discussion on Legal Services provided via [facility] and explanations of various Courts within the [employer]. [address] Members also provided updated information on activities and survey regarding services were distributed for completion. Next support group date and time reviewed and flyer given out.

## 2020-10-27 ENCOUNTER — TELEPHONE (OUTPATIENT)
Dept: INFECTIOUS DISEASES | Age: 30
End: 2020-10-27

## 2020-10-27 NOTE — TELEPHONE ENCOUNTER
Call placed to pt to discuss need for flu vaccine. Per Dr Rolly Pennington pt is ok for vaccine. Pt prefers NOT to get flu vaccine at all this year. Otherwise, pt denies issues or concerns and will call office if needed.

## 2020-11-05 ENCOUNTER — TELEPHONE (OUTPATIENT)
Dept: INFECTIOUS DISEASES | Age: 30
End: 2020-11-05

## 2020-11-05 NOTE — TELEPHONE ENCOUNTER
Call placed to pt regarding need to update RW eligibility and paperwork. Reviewed all needed documentation for update. This includes proof of income ( 1 month total, most recent) proof of residency and ID.  pt is aware of the program, verbalized understanding and denies questions.  (no changes)     JIMBO apt made 11.16.2020 via phone    Denies any other issues or concerns and will call office if needed

## 2020-11-10 ENCOUNTER — TELEPHONE (OUTPATIENT)
Dept: INFECTIOUS DISEASES | Age: 30
End: 2020-11-10

## 2020-11-16 ENCOUNTER — NURSE ONLY (OUTPATIENT)
Dept: INFECTIOUS DISEASES | Age: 30
End: 2020-11-16

## 2020-11-16 NOTE — PROGRESS NOTES
Re-Assessment      Patient ID:   Marcelino Avendano  Date:   11/16/2020      Client ID:  [id number]    [address]  [address]  [address] [address] (home)     Family/House:    [] Partner  [] Children  [] Other -     Mental Health:   Hx of     Substance Abuse:   Marijuana Use  Social History     Socioeconomic History    Marital status: Single     Spouse name: Not on file    Number of children: Not on file    Years of education: Not on file    Highest education level: Not on file   Occupational History    Not on file   Social Needs    Financial resource strain: Not on file    Food insecurity     Worry: Not on file     Inability: Not on file    Transportation needs     Medical: Not on file     Non-medical: Not on file   Tobacco Use    Smoking status: Never Smoker    Smokeless tobacco: Never Used   Substance and Sexual Activity    Alcohol use: No    Drug use: Yes     Types: Marijuana     Comment: smoked 2 weeks ago    Sexual activity: Not on file   Lifestyle    Physical activity     Days per week: Not on file     Minutes per session: Not on file    Stress: Not on file   Relationships    Social connections     Talks on phone: Not on file     Gets together: Not on file     Attends Congregation service: Not on file     Active member of club or organization: Not on file     Attends meetings of clubs or organizations: Not on file     Relationship status: Not on file    Intimate partner violence     Fear of current or ex partner: Not on file     Emotionally abused: Not on file     Physically abused: Not on file     Forced sexual activity: Not on file   Other Topics Concern    Not on file   Social History Narrative    Not on file       Housing:   apartment    Health/Healthcare:  HIV Stable   Physician Visit:  Dr. Remy Rover- 7/20   Dental Visit:  CM referred to [facility]    CD4:   Lab Results   Component Value Date    LABABSO 642 06/30/2020       Viral Load:  Lab Results   Component Value Date    [id number] Not

## 2020-12-03 ENCOUNTER — TELEPHONE (OUTPATIENT)
Dept: INFECTIOUS DISEASES | Age: 30
End: 2020-12-03

## 2020-12-03 NOTE — TELEPHONE ENCOUNTER
Pt called into office in need of help with transport for up coming apt. .   Rn and pt reviewed additional transport options and assistance. It is determined he will use [facility] as last report for help. Aware of policy and  time. [facility] notified. All appts are related to medical treatment and are necessary for compliance    Appt:   Pt is in need of new ID due to change of address. This is needed for [address] [place] [address]. [facility]   at 8 am--- [facility], [id number] [place] [address]      Denies any needs at this time. Denies any issues that need Doctor attention.  Will call with concerns

## 2021-01-07 ENCOUNTER — TELEPHONE (OUTPATIENT)
Dept: INFECTIOUS DISEASES | Age: 31
End: 2021-01-07

## 2021-01-07 NOTE — TELEPHONE ENCOUNTER
Per request, CM scheduled transport to appointment with [facility] 1/11/21 @ 11AM.  CM assessed need, made arrangements as last resort.

## 2021-01-13 ENCOUNTER — HOSPITAL ENCOUNTER (EMERGENCY)
Age: 31
Discharge: HOME | End: 2021-01-13
Attending: EMERGENCY MEDICINE
Payer: MEDICAID

## 2021-01-13 ENCOUNTER — TELEPHONE (OUTPATIENT)
Dept: INFECTIOUS DISEASES | Age: 31
End: 2021-01-13

## 2021-01-13 ENCOUNTER — APPOINTMENT (OUTPATIENT)
Dept: GENERAL RADIOLOGY | Age: 31
End: 2021-01-13
Payer: MEDICAID

## 2021-01-13 VITALS
OXYGEN SATURATION: 96 % | SYSTOLIC BLOOD PRESSURE: 125 MMHG | TEMPERATURE: 97 F | RESPIRATION RATE: 18 BRPM | HEIGHT: 70 IN | WEIGHT: 230 LBS | BODY MASS INDEX: 32.93 KG/M2 | DIASTOLIC BLOOD PRESSURE: 73 MMHG | HEART RATE: 95 BPM

## 2021-01-13 DIAGNOSIS — S62.91XA CLOSED FRACTURE OF RIGHT HAND, INITIAL ENCOUNTER: Primary | ICD-10-CM

## 2021-01-13 PROCEDURE — 73130 X-RAY EXAM OF HAND: CPT

## 2021-01-13 PROCEDURE — 99283 EMERGENCY DEPT VISIT LOW MDM: CPT

## 2021-01-13 PROCEDURE — 29125 APPL SHORT ARM SPLINT STATIC: CPT

## 2021-01-13 PROCEDURE — 6370000000 HC RX 637 (ALT 250 FOR IP): Performed by: EMERGENCY MEDICINE

## 2021-01-13 RX ORDER — HYDROCODONE BITARTRATE AND ACETAMINOPHEN 5; 325 MG/1; MG/1
1 TABLET ORAL EVERY 6 HOURS PRN
Qty: 12 TABLET | Refills: 0 | Status: SHIPPED | OUTPATIENT
Start: 2021-01-13 | End: 2021-01-16

## 2021-01-13 RX ORDER — IBUPROFEN 800 MG/1
800 TABLET ORAL ONCE
Status: COMPLETED | OUTPATIENT
Start: 2021-01-13 | End: 2021-01-13

## 2021-01-13 RX ADMIN — IBUPROFEN 800 MG: 800 TABLET ORAL at 01:50

## 2021-01-13 ASSESSMENT — PAIN DESCRIPTION - ORIENTATION: ORIENTATION: RIGHT

## 2021-01-13 ASSESSMENT — ENCOUNTER SYMPTOMS
SHORTNESS OF BREATH: 0
VOMITING: 0
DIARRHEA: 0
WHEEZING: 0
SORE THROAT: 0
NAUSEA: 0
ABDOMINAL PAIN: 0
COUGH: 0
ABDOMINAL DISTENTION: 0
EYE DISCHARGE: 0
CHEST TIGHTNESS: 0
PHOTOPHOBIA: 0

## 2021-01-13 ASSESSMENT — PAIN DESCRIPTION - PAIN TYPE: TYPE: ACUTE PAIN

## 2021-01-13 ASSESSMENT — PAIN DESCRIPTION - DESCRIPTORS: DESCRIPTORS: THROBBING

## 2021-01-13 ASSESSMENT — PAIN DESCRIPTION - FREQUENCY: FREQUENCY: CONTINUOUS

## 2021-01-13 NOTE — ED NOTES
D/C instructions given to patient no questions ask. Patient verbalized understanding and ambulated from ED without any complications.      Kerri Spears RN  01/13/21 [id number]

## 2021-01-13 NOTE — TELEPHONE ENCOUNTER
Late note 430 pm    Pt called into office in need of help with transport for up coming apt. .   Rn and pt reviewed additional transport options and assistance. It is determined he will use [facility] as last report for help. Aware of policy and  time. [facility] notified. All appts are related to medical treatment and are necessary for compliance    Appt: [facility] out pt lab 1.15.21 at 11 am      Reviewed upcoming V/V as well. Denies issues. Denies any needs at this time. Denies any issues that need Doctor attention.  Will call with concerns
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
## 2021-01-15 DIAGNOSIS — Z21 ASYMPTOMATIC HIV INFECTION (HCC): ICD-10-CM

## 2021-01-15 LAB
ALBUMIN SERPL-MCNC: 4.5 G/DL (ref 3.5–4.6)
ALP BLD-CCNC: 57 U/L (ref 35–104)
ALT SERPL-CCNC: 22 U/L (ref 0–41)
ANION GAP SERPL CALCULATED.3IONS-SCNC: 12 MEQ/L (ref 9–15)
AST SERPL-CCNC: 24 U/L (ref 0–40)
BILIRUB SERPL-MCNC: 0.3 MG/DL (ref 0.2–0.7)
BUN BLDV-MCNC: 10 MG/DL (ref 6–20)
CALCIUM SERPL-MCNC: 9.6 MG/DL (ref 8.5–9.9)
CHLORIDE BLD-SCNC: 102 MEQ/L (ref 95–107)
CO2: 27 MEQ/L (ref 20–31)
CREAT SERPL-MCNC: 0.8 MG/DL (ref 0.7–1.2)
GFR AFRICAN AMERICAN: >60
GFR NON-AFRICAN AMERICAN: >60
GLOBULIN: 3.5 G/DL (ref 2.3–3.5)
GLUCOSE BLD-MCNC: 87 MG/DL (ref 70–99)
HCT VFR BLD CALC: 45.9 % (ref 42–52)
HEMOGLOBIN: 15.4 G/DL (ref 14–18)
MCH RBC QN AUTO: 30.8 PG (ref 27–31.3)
MCHC RBC AUTO-ENTMCNC: 33.5 % (ref 33–37)
MCV RBC AUTO: 92 FL (ref 80–100)
PDW BLD-RTO: 13.4 % (ref 11.5–14.5)
PLATELET # BLD: 184 K/UL (ref 130–400)
POTASSIUM SERPL-SCNC: 3.8 MEQ/L (ref 3.4–4.9)
RBC # BLD: 4.99 M/UL (ref 4.7–6.1)
SODIUM BLD-SCNC: 141 MEQ/L (ref 135–144)
TOTAL PROTEIN: 8 G/DL (ref 6.3–8)
WBC # BLD: 4 K/UL (ref 4.8–10.8)

## 2021-01-17 LAB
ABSOLUTE CD 4 HELPER: 537 CELLS/UL (ref 430–1800)
CD4 % HELPER T CELL: 28 % (ref 32–64)
LYMPHOCYTE SUBSET PANEL 2 INFO: ABNORMAL

## 2021-01-18 ENCOUNTER — OFFICE VISIT (OUTPATIENT)
Dept: ORTHOPEDIC SURGERY | Age: 31
End: 2021-01-18
Payer: MEDICAID

## 2021-01-18 VITALS
TEMPERATURE: 95 F | HEART RATE: 59 BPM | WEIGHT: 230 LBS | OXYGEN SATURATION: 99 % | HEIGHT: 70 IN | BODY MASS INDEX: 32.93 KG/M2

## 2021-01-18 DIAGNOSIS — S62.324A CLOSED DISPLACED FRACTURE OF SHAFT OF FOURTH METACARPAL BONE OF RIGHT HAND, INITIAL ENCOUNTER: Primary | ICD-10-CM

## 2021-01-18 PROCEDURE — G8427 DOCREV CUR MEDS BY ELIG CLIN: HCPCS | Performed by: ORTHOPAEDIC SURGERY

## 2021-01-18 PROCEDURE — 99203 OFFICE O/P NEW LOW 30 MIN: CPT | Performed by: ORTHOPAEDIC SURGERY

## 2021-01-18 PROCEDURE — G8417 CALC BMI ABV UP PARAM F/U: HCPCS | Performed by: ORTHOPAEDIC SURGERY

## 2021-01-18 PROCEDURE — G8484 FLU IMMUNIZE NO ADMIN: HCPCS | Performed by: ORTHOPAEDIC SURGERY

## 2021-01-18 PROCEDURE — 26600 TREAT METACARPAL FRACTURE: CPT | Performed by: ORTHOPAEDIC SURGERY

## 2021-01-18 PROCEDURE — 1036F TOBACCO NON-USER: CPT | Performed by: ORTHOPAEDIC SURGERY
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
## 2021-01-19 LAB
HIV-1 QNT LOG, IU/ML: NOT DETECTED LOG CPY/ML
HIV-1 QNT, IU/ML: NOT DETECTED CPY/ML
INTERPRETATION: NOT DETECTED

## 2021-02-01 ENCOUNTER — HOSPITAL ENCOUNTER (OUTPATIENT)
Dept: ORTHOPEDIC SURGERY | Age: 31
Discharge: HOME | End: 2021-02-03
Payer: MEDICAID

## 2021-02-01 ENCOUNTER — OFFICE VISIT (OUTPATIENT)
Dept: ORTHOPEDIC SURGERY | Age: 31
End: 2021-02-01
Payer: MEDICAID

## 2021-02-01 VITALS
WEIGHT: 230 LBS | BODY MASS INDEX: 32.93 KG/M2 | OXYGEN SATURATION: 98 % | TEMPERATURE: 97.5 F | HEIGHT: 70 IN | HEART RATE: 61 BPM

## 2021-02-01 DIAGNOSIS — S62.324A CLOSED DISPLACED FRACTURE OF SHAFT OF FOURTH METACARPAL BONE OF RIGHT HAND, INITIAL ENCOUNTER: ICD-10-CM

## 2021-02-01 DIAGNOSIS — S62.324D CLOSED DISPLACED FRACTURE OF SHAFT OF FOURTH METACARPAL BONE OF RIGHT HAND WITH ROUTINE HEALING, SUBSEQUENT ENCOUNTER: Primary | ICD-10-CM

## 2021-02-01 PROCEDURE — G8427 DOCREV CUR MEDS BY ELIG CLIN: HCPCS | Performed by: ORTHOPAEDIC SURGERY

## 2021-02-01 PROCEDURE — 1036F TOBACCO NON-USER: CPT | Performed by: ORTHOPAEDIC SURGERY

## 2021-02-01 PROCEDURE — G8417 CALC BMI ABV UP PARAM F/U: HCPCS | Performed by: ORTHOPAEDIC SURGERY

## 2021-02-01 PROCEDURE — 73130 X-RAY EXAM OF HAND: CPT

## 2021-02-01 PROCEDURE — G8484 FLU IMMUNIZE NO ADMIN: HCPCS | Performed by: ORTHOPAEDIC SURGERY

## 2021-02-01 PROCEDURE — 99024 POSTOP FOLLOW-UP VISIT: CPT | Performed by: ORTHOPAEDIC SURGERY

## 2021-02-01 PROCEDURE — 73130 X-RAY EXAM OF HAND: CPT | Performed by: ORTHOPAEDIC SURGERY

## 2021-02-01 NOTE — PROGRESS NOTES
Physically abused: Not on file     Forced sexual activity: Not on file   Other Topics Concern    Not on file   Social History Narrative    Not on file     History reviewed. No pertinent family history. No Known Allergies  Current Outpatient Medications on File Prior to Visit   Medication Sig Dispense Refill    TRIUMEQ 600- MG TABS TAKE 1 TABLET BY MOUTH DAILY 30 tablet 5    sertraline (ZOLOFT) 50 MG tablet TK 1 T PO QD  1    ziprasidone (GEODON) 20 MG capsule TK 1 C PO BID WITH ZACK AND DINNER  1    neomycin-bacitracin-polymyxin (NEOSPORIN) 400-5-5000 ointment Apply topically 2 times daily. 30 g 1    ibuprofen (ADVIL;MOTRIN) 800 MG tablet Take 1 tablet by mouth every 8 hours as needed for Pain or Fever 15 tablet 0     No current facility-administered medications on file prior to visit. Review of Systems   General: Denies fever, chills  Cardiovascular: Denies chest pain  Pulmonary: Denies shortness of breath  GI: Denies nausea or vomiting  Neuro: Denies numbness or tingling      Objective:   Pulse 61   Temp 97.5 °F (36.4 °C) (Temporal)   Ht 5' 10\" (1.778 m)   Wt 230 lb (104.3 kg)   SpO2 98%   BMI 33.00 kg/m²     Ortho Exam   General: Well-appearing male who appears their stated age  Right upper extremity:  Skin: Intact circumferentially, no swelling, ecchymosis or edema is appreciated which is mild improvement from prior exam  Neuro: Sensation intact light touch in the radial, ulnar and median nerve distribution. Able to perform all cardinal hand movements. Vascular: Strong palpable radial pulse, brisk cap refill in all digits. MSK: Patient with persistent appropriate rotation of the ring finger compared to the middle and small finger. No evidence of rotational deformity with flexion or extension of the digit.   Minimally tender palpation about the fourth metacarpal.      Radiographs and Laboratory Studies:     Diagnostic Imaging Studies: 3 views of the right hand were obtained and reviewed including AP, oblique and lateral imaging    X-rays demonstrate unchanged alignment with no interval displacement of spiral fracture of the fourth metacarpal shaft. Laboratory Studies:   Lab Results   Component Value Date    WBC 4.0 (L) 01/15/2021    HGB 15.4 01/15/2021    HCT 45.9 01/15/2021    MCV 92.0 01/15/2021     01/15/2021     No results found for: [address]  No results found for: CRP    Assessment:     Fourth metacarpal shaft fracture with routine healing right hand     Plan:     Patient doing well overall. And was transferred into a 345 cast today in office. Patient to keep this cast on for additional 2 weeks with hopeful removal of cast in office and transition to functional brace at that time. Patient should continue no lifting pulling or pushing with the upper extremity. Orders Placed This Encounter   Procedures    XR HAND RIGHT (2 VIEWS)     Standing Status:   Future     Standing Expiration Date:   2/1/2022    XR HAND RIGHT (MIN 3 VIEWS)     Standing Status:   Future     Standing Expiration Date:   2/1/2022     No orders of the defined types were placed in this encounter. No follow-ups on file.       Michelle Montgomery MD

## 2021-02-08 ENCOUNTER — NURSE ONLY (OUTPATIENT)
Dept: ORTHOPEDIC SURGERY | Age: 31
End: 2021-02-08
Payer: MEDICAID

## 2021-02-08 ENCOUNTER — HOSPITAL ENCOUNTER (OUTPATIENT)
Dept: ORTHOPEDIC SURGERY | Age: 31
Discharge: HOME | End: 2021-02-10
Payer: MEDICAID

## 2021-02-08 VITALS — BODY MASS INDEX: 32.93 KG/M2 | TEMPERATURE: 96 F | HEIGHT: 70 IN | WEIGHT: 230 LBS

## 2021-02-08 DIAGNOSIS — S62.324A CLOSED DISPLACED FRACTURE OF SHAFT OF FOURTH METACARPAL BONE OF RIGHT HAND, INITIAL ENCOUNTER: ICD-10-CM

## 2021-02-08 DIAGNOSIS — S62.324A CLOSED DISPLACED FRACTURE OF SHAFT OF FOURTH METACARPAL BONE OF RIGHT HAND, INITIAL ENCOUNTER: Primary | ICD-10-CM

## 2021-02-08 PROCEDURE — 29075 APPL CST ELBW FNGR SHORT ARM: CPT | Performed by: PHYSICIAN ASSISTANT

## 2021-02-08 PROCEDURE — 73130 X-RAY EXAM OF HAND: CPT

## 2021-02-08 PROCEDURE — 73130 X-RAY EXAM OF HAND: CPT | Performed by: ORTHOPAEDIC SURGERY

## 2021-02-08 PROCEDURE — 99024 POSTOP FOLLOW-UP VISIT: CPT | Performed by: PHYSICIAN ASSISTANT

## 2021-02-08 RX ORDER — HYDROCODONE BITARTRATE AND ACETAMINOPHEN 5; 325 MG/1; MG/1
1 TABLET ORAL EVERY 4 HOURS PRN
Qty: 18 TABLET | Refills: 0 | Status: SHIPPED | OUTPATIENT
Start: 2021-02-08 | End: 2021-02-11

## 2021-02-08 NOTE — PROGRESS NOTES
Wearing a Fiberglass Cast: Care Instructions  Your Care Instructions    A cast protects a broken bone or other injury while it heals. Most casts are made of fiberglass. After a cast is put on, you can't remove it yourself. Your doctor or a technician will take it off. Follow-up care is a key part of your treatment and safety. Be sure to make and go to all appointments, and call your doctor if you are having problems. It's also a good idea to know your test results and keep a list of the medicines you take. How can you care for yourself at home? General care  · Follow your doctor's instructions for when you can start using the limb that has the cast. Fiberglass casts dry quickly and are soon hard enough to protect the injured arm or leg. · When it's okay to put weight on your leg or foot cast, don't stand or walk on it unless it's designed for walking. · Prop up the injured arm or leg on a pillow anytime you sit or lie down during the first 3 days. Try to keep it above the level of your heart. This will help reduce swelling. · Put ice or a cold pack on your cast for 10 to 20 minutes at a time. Try to do this every 1 to 2 hours for the next 3 days (when you are awake). Put a thin cloth between the ice and your cast. Keep the cast dry. · Be safe with medicines. Read and follow all instructions on the label. ? If the doctor gave you a prescription medicine for pain, take it as prescribed. ? If you are not taking a prescription pain medicine, ask your doctor if you can take an over-the-counter medicine. · Do exercises as instructed by your doctor or physical therapist. These exercises will help keep your muscles strong and your joints flexible while you heal.  · Wiggle your fingers or toes on the injured arm or leg often. This helps reduce swelling and stiffness.   Water and your cast · Try to keep your cast as dry as you can. The fiberglass part of your cast can get wet. But getting the inside wet can cause problems. · Use a bag or tape a sheet of plastic to cover your cast when you take a shower or bath or when you have any other contact with water. (Don't take a bath unless you can keep the cast out of the water.) Moisture can collect under the cast and cause skin irritation and itching. It can make infection more likely if you have had surgery or have a wound under the cast.  · If you have a water-resistant cast, ask your doctor how often it can get wet and how to take care of it. Cast and skin care  · Try blowing cool air from a hair dryer or fan into the cast to help relieve itching. Never stick items under your cast to scratch the skin. · Don't use oils or lotions near your cast. If the skin gets red or irritated around the edge of the cast, you may pad the edges with a soft material or use tape to cover them. When should you call for help? Call your doctor now or seek immediate medical care if:    · You have increased or severe pain. · You feel a warm or painful spot under the cast.     · You have problems with your cast. For example:  ? The skin under the cast burns or stings. ? The cast feels too tight or too loose. ? There is a lot of swelling near the cast. (Some swelling is normal.)  ? You have a new fever. ? There is drainage or a bad smell coming from the cast.     · Your foot or hand is cool or pale or changes color. · You have trouble moving your fingers or toes. · You have symptoms of a blood clot in your arm or leg (called a deep vein thrombosis). These may include:  ? Pain in the arm, calf, back of the knee, thigh, or groin. ? Redness and swelling in the arm, leg, or groin. Watch closely for changes in your health, and be sure to contact your doctor if:    · The cast is breaking apart. · You are not getting better as expected. Where can you learn more? Go to https://chpepiceweb.[email or web address]. org and sign in to your [employer] account. Enter 377 [id number] in the [employer] box to learn more about \"Wearing a Fiberglass Cast: Care Instructions. \"     If you do not have an account, please click on the \"Sign Up Now\" link. Current as of: June 26, 2019  Content Version: 12.3  © 0704-1954 [employer]orporated. Care instructions adapted under license by [facility] ([facility]). If you have questions about a medical condition or this instruction, always ask your healthcare professional. [address] any warranty or liability for your use of this information.

## 2021-02-08 NOTE — PROGRESS NOTES
[facility]      Subjective:      Chief Complaint   Patient presents with    Follow-up       HPI: Nathan Bush is a 31 y.o. male who is here for cast exchange after taking it off for a fourth metacarpal fracture. He has been without a cast for the last week. This off himself. Has had repetitive trauma to the area since that time. Past Medical History:   Diagnosis Date    [facility]     Immune deficiency disorder ([facility]     HIV not in treatment currently    Late latent syphilis       No past surgical history on file.   Social History     Socioeconomic History    Marital status: Single     Spouse name: Not on file    Number of children: Not on file    Years of education: Not on file    Highest education level: Not on file   Occupational History    Not on file   Social Needs    Financial resource strain: Not on file    Food insecurity     Worry: Not on file     Inability: Not on file    Transportation needs     Medical: Not on file     Non-medical: Not on file   Tobacco Use    Smoking status: Never Smoker    Smokeless tobacco: Never Used   Substance and Sexual Activity    Alcohol use: No    Drug use: Yes     Types: Marijuana     Comment: smoked 2 weeks ago    Sexual activity: Not on file   Lifestyle    Physical activity     Days per week: Not on file     Minutes per session: Not on file    Stress: Not on file   Relationships    Social connections     Talks on phone: Not on file     Gets together: Not on file     Attends Islam service: Not on file     Active member of club or organization: Not on file     Attends meetings of clubs or organizations: Not on file     Relationship status: Not on file    Intimate partner violence     Fear of current or ex partner: Not on file     Emotionally abused: Not on file     Physically abused: Not on file     Forced sexual activity: Not on file   Other Topics Concern  Not on file   Social History Narrative    Not on file     No family history on file. No Known Allergies  Current Outpatient Medications on File Prior to Visit   Medication Sig Dispense Refill    TRIUMEQ 600- MG TABS TAKE 1 TABLET BY MOUTH DAILY 30 tablet 5    sertraline (ZOLOFT) 50 MG tablet TK 1 T PO QD  1    ziprasidone (GEODON) 20 MG capsule TK 1 C PO BID WITH ZACK AND DINNER  1    neomycin-bacitracin-polymyxin (NEOSPORIN) 400-5-5000 ointment Apply topically 2 times daily. 30 g 1    ibuprofen (ADVIL;MOTRIN) 800 MG tablet Take 1 tablet by mouth every 8 hours as needed for Pain or Fever 15 tablet 0     No current facility-administered medications on file prior to visit. Objective: There were no vitals taken for this visit. Radiographs and Laboratory Studies:   Previous diagnostic imaging studies were reviewed. Laboratory Studies:   Lab Results   Component Value Date    WBC 4.0 (L) 01/15/2021    HGB 15.4 01/15/2021    HCT 45.9 01/15/2021    MCV 92.0 01/15/2021     01/15/2021     No results found for: Denise Messer  No results found for: CRP    Assessment and Plan:      Diagnosis Orders   1. Closed displaced fracture of shaft of fourth metacarpal bone of right hand, initial encounter  XR HAND RIGHT (MIN 3 VIEWS)     About a week after injuring his fourth metatarsal in his right hand, he was wearing a cast but took it off himself and has been wearing nothing for the last couple of days. There is been repetitive trauma between now and then. Concern therefore his lack of compliance as well as further displacement. X-rays did not show any further displacement today. We talked in great detail about how compliance of his cast is essentially important to prevent any surgical interventions in the near future. he understands this. A well molded gutter cast in the right hand was applied. He will see Dr. El Garcia back in 2 weeks-aruna with an XR. The above plan was discussed in thorough detail with Dr. Anjana Vega and the patient. Orders Placed This Encounter   Procedures    XR HAND RIGHT (MIN 3 VIEWS)     Standing Status:   Future     Standing Expiration Date:   2/8/2022     No orders of the defined types were placed in this encounter. No follow-ups on file.     Guillermo Davis PA-C  [facility]  [phone]

## 2021-02-15 ENCOUNTER — OFFICE VISIT (OUTPATIENT)
Dept: ORTHOPEDIC SURGERY | Age: 31
End: 2021-02-15
Payer: MEDICAID

## 2021-02-15 ENCOUNTER — HOSPITAL ENCOUNTER (OUTPATIENT)
Dept: ORTHOPEDIC SURGERY | Age: 31
Discharge: HOME | End: 2021-02-17
Payer: MEDICAID

## 2021-02-15 VITALS
HEIGHT: 70 IN | HEART RATE: 65 BPM | TEMPERATURE: 96.3 F | WEIGHT: 230 LBS | BODY MASS INDEX: 32.93 KG/M2 | OXYGEN SATURATION: 98 %

## 2021-02-15 DIAGNOSIS — S62.324D CLOSED DISPLACED FRACTURE OF SHAFT OF FOURTH METACARPAL BONE OF RIGHT HAND WITH ROUTINE HEALING, SUBSEQUENT ENCOUNTER: Primary | ICD-10-CM

## 2021-02-15 DIAGNOSIS — S62.324D CLOSED DISPLACED FRACTURE OF SHAFT OF FOURTH METACARPAL BONE OF RIGHT HAND WITH ROUTINE HEALING, SUBSEQUENT ENCOUNTER: ICD-10-CM

## 2021-02-15 PROCEDURE — 99024 POSTOP FOLLOW-UP VISIT: CPT | Performed by: ORTHOPAEDIC SURGERY

## 2021-02-15 PROCEDURE — 73130 X-RAY EXAM OF HAND: CPT | Performed by: ORTHOPAEDIC SURGERY

## 2021-02-15 PROCEDURE — G8427 DOCREV CUR MEDS BY ELIG CLIN: HCPCS | Performed by: ORTHOPAEDIC SURGERY

## 2021-02-15 PROCEDURE — 73130 X-RAY EXAM OF HAND: CPT

## 2021-02-15 PROCEDURE — G8484 FLU IMMUNIZE NO ADMIN: HCPCS | Performed by: ORTHOPAEDIC SURGERY

## 2021-02-15 PROCEDURE — 1036F TOBACCO NON-USER: CPT | Performed by: ORTHOPAEDIC SURGERY

## 2021-02-15 PROCEDURE — G8417 CALC BMI ABV UP PARAM F/U: HCPCS | Performed by: ORTHOPAEDIC SURGERY

## 2021-02-15 NOTE — PROGRESS NOTES
Subjective:      Patient ID: Carmen Pabon is a 31 y.o. male who presents today for:  Chief Complaint   Patient presents with    Follow-up     2wk f/u for cast check, pt states he has no pain and the hand feels ok. HPI  Patient doing okay overall hopeful to get his cast off today. Denies any significant pain or discomfort at this time. Past Medical History:   Diagnosis Date    HIV (human immunodeficiency virus infection) ([facility]     Immune deficiency disorder ([facility]     HIV not in treatment currently    Late latent syphilis       History reviewed. No pertinent surgical history.   Social History     Socioeconomic History    Marital status: Single     Spouse name: Not on file    Number of children: Not on file    Years of education: Not on file    Highest education level: Not on file   Occupational History    Not on file   Social Needs    Financial resource strain: Not on file    Food insecurity     Worry: Not on file     Inability: Not on file    Transportation needs     Medical: Not on file     Non-medical: Not on file   Tobacco Use    Smoking status: Never Smoker    Smokeless tobacco: Never Used   Substance and Sexual Activity    Alcohol use: No    Drug use: Yes     Types: Marijuana     Comment: smoked 2 weeks ago    Sexual activity: Not on file   Lifestyle    Physical activity     Days per week: Not on file     Minutes per session: Not on file    Stress: Not on file   Relationships    Social connections     Talks on phone: Not on file     Gets together: Not on file     Attends [employer] service: Not on file     Active member of club or organization: Not on file     Attends meetings of clubs or organizations: Not on file     Relationship status: Not on file    Intimate partner violence     Fear of current or ex partner: Not on file     Emotionally abused: Not on file     Physically abused: Not on file     Forced sexual activity: Not on file   Other Topics Concern    Not on file X-rays demonstrate blunting of fracture edges and early callus formation appreciated at edges of spiral fracture of the fourth metacarpal shaft. There is been minimal shortening and no significant change in angulation appreciated. Laboratory Studies:   Lab Results   Component Value Date    WBC 4.0 (L) 01/15/2021    HGB 15.4 01/15/2021    HCT 45.9 01/15/2021    MCV 92.0 01/15/2021     01/15/2021     No results found for: Annel Trent  No results found for: CRP    Assessment:       Diagnosis Orders   1. Closed displaced fracture of shaft of fourth metacarpal bone of right hand with routine healing, subsequent encounter  XR HAND RIGHT (MIN 3 VIEWS)          Plan:     Patient is clinically healed regarding his fourth metacarpal shaft fracture and appears to be radiographically progressing as well. I did offer patient further removable splint immobilization however patient states he needs to use his hand and as such would prefer lainey taped mechanism at this point. Given his amount of clinical healing I do feel this will be appropriate. I instructed patient and demonstrated how to lainey tape the fingers appropriately. Patient is going to limit weightbearing on this for an additional 2 weeks and then transition to weightbearing as tolerated. Instructed patient to begin range of motion of the digit to allow for improved motion and reduce stiffness. I like see patient back in 6 weeks for repeat evaluation and radiographs. Orders Placed This Encounter   Procedures    XR HAND RIGHT (MIN 3 VIEWS)     Standing Status:   Future     Number of Occurrences:   1     Standing Expiration Date:   2/15/2022     No orders of the defined types were placed in this encounter. No follow-ups on file.       Tomi Evans MD

## 2021-02-17 ENCOUNTER — TELEPHONE (OUTPATIENT)
Dept: INFECTIOUS DISEASES | Age: 31
End: 2021-02-17

## 2021-02-17 NOTE — TELEPHONE ENCOUNTER
Pt called into clinic requesting help with basic needs. Requesting food/gas voucher    Pt into [facility]  Needs assessed. 2 food and 1 gas cardgiven to pt. . Pt will be going to court and has friend taking him. Denies other needs at this time and will call with concerns and/or updates      Denies any needs at this time. Denies any issues that need Doctor attention.  Will call with concerns    COVID vouchers-- NO

## 2021-02-24 ENCOUNTER — TELEPHONE (OUTPATIENT)
Dept: INFECTIOUS DISEASES | Age: 31
End: 2021-02-24

## 2021-02-26 ENCOUNTER — TELEPHONE (OUTPATIENT)
Dept: INFECTIOUS DISEASES | Age: 31
End: 2021-02-26

## 2021-02-26 ENCOUNTER — VIRTUAL VISIT (OUTPATIENT)
Dept: INFECTIOUS DISEASES | Age: 31
End: 2021-02-26
Payer: MEDICAID

## 2021-02-26 DIAGNOSIS — Z21 ASYMPTOMATIC HIV INFECTION (HCC): Primary | ICD-10-CM

## 2021-02-26 PROCEDURE — 1036F TOBACCO NON-USER: CPT | Performed by: INTERNAL MEDICINE

## 2021-02-26 PROCEDURE — G8417 CALC BMI ABV UP PARAM F/U: HCPCS | Performed by: INTERNAL MEDICINE

## 2021-02-26 PROCEDURE — 99212 OFFICE O/P EST SF 10 MIN: CPT | Performed by: INTERNAL MEDICINE

## 2021-02-26 PROCEDURE — G8428 CUR MEDS NOT DOCUMENT: HCPCS | Performed by: INTERNAL MEDICINE

## 2021-02-26 PROCEDURE — G8484 FLU IMMUNIZE NO ADMIN: HCPCS | Performed by: INTERNAL MEDICINE

## 2021-02-26 ASSESSMENT — ENCOUNTER SYMPTOMS
GASTROINTESTINAL NEGATIVE: 1
NAUSEA: 0
VOMITING: 0
COUGH: 0
CONSTIPATION: 0
ABDOMINAL PAIN: 0
DIARRHEA: 0
RESPIRATORY NEGATIVE: 1
SHORTNESS OF BREATH: 0

## 2021-02-26 NOTE — TELEPHONE ENCOUNTER
Call placed to pt as V/V follow up. Reviewed apt with Dr. Richi Iglesias. He States the apt went well. Discussed compliance --- no issues. No issues with medications or pharmacy     Pt has no questions regarding apt. Pt denies needing refill at this time. 6 month follow up able to be scheduled. Pt will be out of town end of Aug for his bday. Discussed the need for lab work to be completed 1 week prior to next apt. Denies any other needs or acute issues that need doctor attention.

## 2021-02-26 NOTE — PROGRESS NOTES
Performed by [place] [id number], [id number] [facility] [phone]   www. Margarita Milligan MD - Lab. Director    HIV-1 Genotype 01/09/2019  2:37 PM [facility]   See Note    Comment:     Drug Resistance:   NRTI Drug Class       VIDEX, (didanosine, ddI)                             None     VIREAD, (tenofovir, TDF)                             None     ZERIT, (stavudine, d4T)                              None     ZIAGEN, (abacavir, ABC)                              None     EMTRIVA, (emtricitabine, FTC)                        None     RETROVIR, (zidovudine, ZDV)                          None     EPIVIR, (lamivudine, 3TC)                            None       NRTI drug resistance mutations identified: None     NNRTI Drug Class       SUSTIVA, (efavirenz, EFV)                            None     VIRAMUNE, (nevirapine, NVP)                          None     INTELENCE, (etravirine, ETR)                         None     EDURANT, (rilpivirine, RPV)                          None       NNRTI drug resistance mutations identified: [id number]     PI+ Drug Class       VIRACEPT, (nelfinavir, NFV)                          None     APTIVUS, (tipranavir, TPV)                           None     CRIXIVAN, (indinavir, IDV)                           None     KALETRA, (lopinavir + ritonavir, LPV)                None     REYATAZ, (atazanavir, ATV)                           None     PREZISTA, (darunavir, DRV)                           None     LEXIVA, (fosamprenavir, FPV)                         None     FORTOVASE / INVIRASE, (saquinavir, SQV)              None       PI+ drug resistance mutations identified: None          PAST MEDICAL HISTORY      Past Medical History        Past Medical History:   Diagnosis Date    Immune deficiency disorder (HCC)       HIV not in treatment currently                 SURGICAL HISTORY     Past Surgical History   History reviewed.  No pertinent surgical history.           ALLERGIES     Patient has no known allergies.     FAMILY HISTORY     Family History   History reviewed. No pertinent family history.           SOCIAL HISTORY        Social History           Review of Systems   Constitutional: Negative. Negative for activity change, appetite change, chills, diaphoresis, fatigue and fever. HENT: Negative. Respiratory: Negative. Negative for cough and shortness of breath. Gastrointestinal: Negative. Negative for abdominal pain, constipation, diarrhea, nausea and vomiting. Genitourinary: Negative. Musculoskeletal: Negative. Neurological: Negative.         HIV-1 QNT, IU/ML Not Detected  cpy/mL Final 01/15/2021 11:10 AM [facility]   HIV-1 QNT Log, IU/ML Not Detected  log cpy/mL Final 01/15/2021 11:10 AM [facility]   Interpretation Not Detected  Not Detected Final 01/15/2021 11:10 AM [facility]     CD4 % Lafe T Cell 28Low   32 - 64 % Final 01/15/2021 11:10 AM [facility]   Absolute CD 4 Lafe 537  430 - 1800 cells/uL Final 01/15/2021 11:10 AM [facility]   Testing Performed By          Plan:        HIV stable     TRIMEQ      Late latent syphilis  Syphilis titer now 1-8 which is no different than 1-16 has been stable for more than a year we will continue to monitor      Follow-up 6 months         Jeffrey Beth MD

## 2021-03-05 ENCOUNTER — TELEPHONE (OUTPATIENT)
Dept: INFECTIOUS DISEASES | Age: 31
End: 2021-03-05

## 2021-03-05 NOTE — TELEPHONE ENCOUNTER
Pt called into office in need of help with transport for up coming apt. .   Rn and pt reviewed additional transport options and assistance. It is determined he will use [facility] as last report for help. Aware of policy and  time. [facility] notified. All appts are related to medical treatment and are necessary for compliance      [facility]   [facility] Denies any needs at this time. Denies any issues that need Doctor attention.  Will call with concerns

## 2021-04-27 ENCOUNTER — TELEPHONE (OUTPATIENT)
Dept: INFECTIOUS DISEASES | Age: 31
End: 2021-04-27

## 2021-05-07 ENCOUNTER — TELEPHONE (OUTPATIENT)
Dept: INFECTIOUS DISEASES | Age: 31
End: 2021-05-07

## 2021-05-07 NOTE — TELEPHONE ENCOUNTER
Spoke with ot regarding COVID vaccine. Pt refuses vaccine at this time. Pt is educated on vaccine and chooses not to receive vaccine.

## 2021-05-13 ENCOUNTER — NURSE ONLY (OUTPATIENT)
Dept: INFECTIOUS DISEASES | Age: 31
End: 2021-05-13

## 2021-05-13 NOTE — PROGRESS NOTES
Re-Assessment      Patient ID:   Facundo Angel  Date:   5/13/2021      Client ID:  [id number]    [address]  [address] [place] [address]  [phone] (home)     Family/House:    [] Partner  [] Children  [] Other -     Mental Health:   Hx of     Substance Abuse:   Hx of SA  Social History     Socioeconomic History    Marital status: Single     Spouse name: Not on file    Number of children: Not on file    Years of education: Not on file    Highest education level: Not on file   Occupational History    Not on file   Social Needs    Financial resource strain: Not on file    Food insecurity     Worry: Not on file     Inability: Not on file    Transportation needs     Medical: Not on file     Non-medical: Not on file   Tobacco Use    Smoking status: Never Smoker    Smokeless tobacco: Never Used   Substance and Sexual Activity    Alcohol use: No    Drug use: Yes     Types: Marijuana     Comment: smoked 2 weeks ago    Sexual activity: Not on file   Lifestyle    Physical activity     Days per week: Not on file     Minutes per session: Not on file    Stress: Not on file   Relationships    Social connections     Talks on phone: Not on file     Gets together: Not on file     Attends Christian service: Not on file     Active member of club or organization: Not on file     Attends meetings of clubs or organizations: Not on file     Relationship status: Not on file    Intimate partner violence     Fear of current or ex partner: Not on file     Emotionally abused: Not on file     Physically abused: Not on file     Forced sexual activity: Not on file   Other Topics Concern    Not on file   Social History Narrative    Not on file       Housing:   apartment    Health/Healthcare:  HIV Stable   Physician Visit:  Dr. Rosario Baker 2/26   Dental Visit:  Pt encouraged to follow up    CD4:   Lab Results   Component Value Date    LABABSO 537 01/15/2021       Viral Load:  Lab Results   Component Value Date    [id number] Not Detected 01/15/2021       Medical Insurance:  Payor: [facility] / Plan: Dominic 250 / Product Type: *No Product type* /    OHDAP/HIPSCA:  NA   Renewal Date:  NA    Income:    SSI    Legal Issues:    NA    Emergency Contact:   Extended Emergency Contact Information  Primary Emergency Contact: Cydney Peguero of [address] Phone: [phone]  Relation: Parent  Secondary Emergency Contact: Darci Crow of [address] Phone: [phone]  Relation: Brother/Sister  Appointment completed via phone per Covid 19 Guidelines. CM called pt for scheduled appointment. CM completed Semi-Annual Review, noted changes to income. CM updated RW Part A Eligibility for services. Pt receives SSI, has [employer] for medical coverage. CM reviewed Grievance Policy, Sliding Scale Fee and Transportation Policy with pt. CM completed PSA and SA/MH Assessments. Pt has hx of both SA and MH. Pt receives counseling at [facility]. CM updated ISP, pt signed in agreement with POC. Pt stated he is compliant with HIV meds. VL from 1/21 in undetectable. CM encouraged follow up with dentist, pt stated he has an appointment \"coming up\". Pt is sexually active. Pt follows safe sex practices, is aware of U=U, undetectable equals un-transmittable. CM provided socialization, no specific issues discussed. Per request, Cm scheduled an appointment/transport to assist with Medicaid Renewal Application 7/76/16 @ 63.  CM assessed need, transport arrangements as last resort. Cm will follow up with pt as needed.

## 2021-05-18 ENCOUNTER — NURSE ONLY (OUTPATIENT)
Dept: INFECTIOUS DISEASES | Age: 31
End: 2021-05-18

## 2021-05-18 NOTE — PROGRESS NOTES
Pt presented in office. CM assisted with Medicaid Renewal Application. CM will follow up with pt as needed.

## 2021-05-25 ENCOUNTER — TELEPHONE (OUTPATIENT)
Dept: INFECTIOUS DISEASES | Age: 31
End: 2021-05-25

## 2021-05-25 NOTE — TELEPHONE ENCOUNTER
Per request, CM scheduled transport to appointment with [facility] @ 9:45AM today. CM assessed need, made arrangements as last resort.

## 2021-06-04 ENCOUNTER — TELEPHONE (OUTPATIENT)
Dept: INFECTIOUS DISEASES | Age: 31
End: 2021-06-04

## 2021-06-04 NOTE — TELEPHONE ENCOUNTER
Call placed to pt to discuss up coming support group. Date 6.29.2021    Discussed upcoming HIV support group. Pt is interested in coming but will be out of town. Will update pt with next support group date and discuss once date is made. Denies questions and will call with concerns. Denies any needs at this time. Denies any issues that need Doctor attention.  Will call with concerns

## 2021-06-16 ENCOUNTER — TELEPHONE (OUTPATIENT)
Dept: INFECTIOUS DISEASES | Age: 31
End: 2021-06-16

## 2021-06-16 NOTE — TELEPHONE ENCOUNTER
Per request, CM scheduled transport for scheduled appointment 6/21/21 @ 8:30AM.  CM assessed need, made arrangements as last resort.

## 2021-06-22 ENCOUNTER — TELEPHONE (OUTPATIENT)
Dept: INFECTIOUS DISEASES | Age: 31
End: 2021-06-22

## 2021-06-22 NOTE — TELEPHONE ENCOUNTER
Pt called into clinic requesting help with basic needs. Requesting food/gas voucher    Rn and pt reviewed additional transport options and assistance. It is determined he will use [facility] as last report for help. Aware of policy and  time. [facility] notified. All appts are related to medical treatment and are necessary for compliance    [facility] ID [phone]  at [phone]    Denies any needs at this time. Denies any issues that need Doctor attention.  Will call with concerns

## 2021-06-23 ENCOUNTER — TELEPHONE (OUTPATIENT)
Dept: INFECTIOUS DISEASES | Age: 31
End: 2021-06-23

## 2021-06-23 RX ORDER — ABACAVIR SULFATE, DOLUTEGRAVIR SODIUM, LAMIVUDINE 600; 50; 300 MG/1; MG/1; MG/1
TABLET, FILM COATED ORAL
Qty: 30 TABLET | Refills: 2 | Status: SHIPPED | OUTPATIENT
Start: 2021-06-23 | End: 2021-09-15

## 2021-06-23 NOTE — TELEPHONE ENCOUNTER
pt into clinic requesting help with basic needs. Requesting food/gas voucher    Needs assessed. 2 COVID food vouchers given to pt. PPE provided and discussed with pt. Provided face mask ----  yes  Cloth masks--  0  Surgical masks--- 50  hand  -- 2  cleaning wipes -- 0  Toilet paper-- 4  RW Part A Provision, to reduce the spread of Covid-19. COVID food vouchers --- yes as above x 2    Pt will be going out of town to visit family in [place]. Discussed medications. He will have enough medications with him.      Pt denies any other issues or concerns that need doctor attention and will call office if needed

## 2021-06-24 DIAGNOSIS — Z13.6 ENCOUNTER FOR LIPID SCREENING FOR CARDIOVASCULAR DISEASE: Primary | ICD-10-CM

## 2021-06-24 DIAGNOSIS — Z21 ASYMPTOMATIC HIV INFECTION (HCC): ICD-10-CM

## 2021-06-24 DIAGNOSIS — E55.9 VITAMIN D DEFICIENCY: ICD-10-CM

## 2021-06-24 DIAGNOSIS — Z13.220 ENCOUNTER FOR LIPID SCREENING FOR CARDIOVASCULAR DISEASE: Primary | ICD-10-CM

## 2021-06-30 ENCOUNTER — TELEPHONE (OUTPATIENT)
Dept: INFECTIOUS DISEASES | Age: 31
End: 2021-06-30

## 2021-06-30 NOTE — TELEPHONE ENCOUNTER
Pt called into clinic requesting help with basic needs. Requesting food/gas voucher    Pt into clinc  Needs assessed. 2 gas cards given to pt. He was able to find his own transport. He has a scheduled apt for tomorrow. Denies other needs at this time and will call with concerns and/or updates      Denies any needs at this time. Denies any issues that need Doctor attention.  Will call with concerns    COVID vouchers- NO

## 2021-07-02 ENCOUNTER — TELEPHONE (OUTPATIENT)
Dept: INFECTIOUS DISEASES | Age: 31
End: 2021-07-02

## 2021-07-02 NOTE — TELEPHONE ENCOUNTER
Per request, CM scheduled transport for appointment with [facility] 7/9/21 @ 10AM.  CM assessed need, made arrangements as last resort.

## 2021-07-12 ENCOUNTER — TELEPHONE (OUTPATIENT)
Dept: INFECTIOUS DISEASES | Age: 31
End: 2021-07-12

## 2021-07-21 ENCOUNTER — TELEPHONE (OUTPATIENT)
Dept: INFECTIOUS DISEASES | Age: 31
End: 2021-07-21

## 2021-07-22 ENCOUNTER — TELEPHONE (OUTPATIENT)
Dept: INFECTIOUS DISEASES | Age: 31
End: 2021-07-22

## 2021-07-22 NOTE — TELEPHONE ENCOUNTER
Pt called into office in need of help with transport for up coming apt. .   Rn and pt reviewed additional transport options and assistance. It is determined he will use [facility] as last report for help. Aware of policy and  time. [facility] notified. All appts are related to medical treatment and are necessary for compliance      Appt: 7.28.21 at [facility]    Denies any needs at this time. Denies any issues that need Doctor attention.  Will call with concerns

## 2021-07-26 ENCOUNTER — TELEPHONE (OUTPATIENT)
Dept: INFECTIOUS DISEASES | Age: 31
End: 2021-07-26

## 2021-07-26 NOTE — TELEPHONE ENCOUNTER
Call placed to pt in need of rescheduling. Pt prefers Dr Aislinn Pitts. Rescheduled for 9.7.21  He will compete labs 8.11.21  He has court 8.9.21 and will call with update      Rn and pt reviewed additional transport options and assistance. It is determined he will use [facility] as last report for help. Aware of policy and  time. [facility] notified. All appts are related to medical treatment and are necessary for compliance        Appt:   8.11.21 at 10 [facility] out [facility] lab  9.7.21 at 930 with Dr Aislinn Pitts    Denies any needs at this time. Denies any issues that need Doctor attention.  Will call with concerns

## 2021-08-02 ENCOUNTER — TELEPHONE (OUTPATIENT)
Dept: INFECTIOUS DISEASES | Age: 31
End: 2021-08-02

## 2021-08-03 ENCOUNTER — TELEPHONE (OUTPATIENT)
Dept: INFECTIOUS DISEASES | Age: 31
End: 2021-08-03

## 2021-08-03 DIAGNOSIS — Z13.220 ENCOUNTER FOR LIPID SCREENING FOR CARDIOVASCULAR DISEASE: ICD-10-CM

## 2021-08-03 DIAGNOSIS — Z13.6 ENCOUNTER FOR LIPID SCREENING FOR CARDIOVASCULAR DISEASE: ICD-10-CM

## 2021-08-03 DIAGNOSIS — E55.9 VITAMIN D DEFICIENCY: ICD-10-CM

## 2021-08-03 DIAGNOSIS — Z21 ASYMPTOMATIC HIV INFECTION (HCC): ICD-10-CM

## 2021-08-03 LAB
ALBUMIN SERPL-MCNC: 4.7 G/DL (ref 3.5–4.6)
ALP BLD-CCNC: 81 U/L (ref 35–104)
ALT SERPL-CCNC: 40 U/L (ref 0–41)
ANION GAP SERPL CALCULATED.3IONS-SCNC: 10 MEQ/L (ref 9–15)
AST SERPL-CCNC: 34 U/L (ref 0–40)
BILIRUB SERPL-MCNC: <0.2 MG/DL (ref 0.2–0.7)
BUN BLDV-MCNC: 13 MG/DL (ref 6–20)
CALCIUM SERPL-MCNC: 9.5 MG/DL (ref 8.5–9.9)
CHLORIDE BLD-SCNC: 103 MEQ/L (ref 95–107)
CHOLESTEROL, TOTAL: 215 MG/DL (ref 0–199)
CO2: 27 MEQ/L (ref 20–31)
CREAT SERPL-MCNC: 0.84 MG/DL (ref 0.7–1.2)
GFR AFRICAN AMERICAN: >60
GFR NON-AFRICAN AMERICAN: >60
GLOBULIN: 3.6 G/DL (ref 2.3–3.5)
GLUCOSE BLD-MCNC: 110 MG/DL (ref 70–99)
HCT VFR BLD CALC: 43.5 % (ref 42–52)
HDLC SERPL-MCNC: 43 MG/DL (ref 40–59)
HEMOGLOBIN: 15.1 G/DL (ref 14–18)
HEPATITIS C ANTIBODY INTERPRETATION: NORMAL
LDL CHOLESTEROL CALCULATED: 153 MG/DL (ref 0–129)
MCH RBC QN AUTO: 31.1 PG (ref 27–31.3)
MCHC RBC AUTO-ENTMCNC: 34.7 % (ref 33–37)
MCV RBC AUTO: 89.5 FL (ref 80–100)
PDW BLD-RTO: 13.4 % (ref 11.5–14.5)
PLATELET # BLD: 191 K/UL (ref 130–400)
POTASSIUM SERPL-SCNC: 3.8 MEQ/L (ref 3.4–4.9)
RBC # BLD: 4.86 M/UL (ref 4.7–6.1)
RPR TITER: NORMAL
RPR: REACTIVE
SODIUM BLD-SCNC: 140 MEQ/L (ref 135–144)
TOTAL PROTEIN: 8.3 G/DL (ref 6.3–8)
TRIGL SERPL-MCNC: 95 MG/DL (ref 0–150)
VITAMIN D 25-HYDROXY: 25.2 NG/ML (ref 30–100)
WBC # BLD: 4.4 K/UL (ref 4.8–10.8)

## 2021-08-05 ENCOUNTER — NURSE ONLY (OUTPATIENT)
Dept: INFECTIOUS DISEASES | Age: 31
End: 2021-08-05

## 2021-08-05 DIAGNOSIS — Z86.19 H/O SYPHILIS: ICD-10-CM

## 2021-08-05 DIAGNOSIS — B20 HUMAN IMMUNODEFICIENCY VIRUS (HCC): Primary | ICD-10-CM

## 2021-08-05 LAB
ABSOLUTE CD 4 HELPER: 549 CELLS/UL (ref 430–1800)
C. TRACHOMATIS DNA ,URINE: NEGATIVE
CD4 % HELPER T CELL: 22 % (ref 32–64)
HIV-1 QNT LOG, IU/ML: NOT DETECTED LOG CPY/ML
HIV-1 QNT, IU/ML: NOT DETECTED CPY/ML
INTERPRETATION: NOT DETECTED
LYMPHOCYTE SUBSET PANEL 2 INFO: ABNORMAL
N. GONORRHOEAE DNA, URINE: NEGATIVE
QUANTI TB GOLD PLUS: NEGATIVE
QUANTI TB1 MINUS NIL: 0 IU/ML (ref 0–0.34)
QUANTI TB2 MINUS NIL: 0.01 IU/ML (ref 0–0.34)
QUANTIFERON MITOGEN: 7.76 IU/ML
QUANTIFERON NIL: 0.04 IU/ML

## 2021-08-05 NOTE — PROGRESS NOTES
- Pt presents to clinic for scheduled apt. Here today for IM injection of Bicillin # 1  Allergies reviewed. Pt has had PCN in past with out concerns  Pt has had BIC inj prior. Tolerated well. After obtaining consent, and per orders of Dr. Lisandra Clay, injection of Bicillin given in Left and Right upper quad. gluteus by Colin Fairchild. Patient instructed to remain in clinic for 20 minutes afterwards,   No adverse reaction noted. Patient tolerated well and denies concerns. LOT: [id number]  EXP: 11.30.21    Patient to call office or go to ER to report any adverse reaction immediately. Pt had unprotected sex while in [place]. He has no symptoms. We discussed importance of using protection. provided active listening as pt discussed his frustrations. PPE provided and discussed with pt. Provided face mask ----  yes  Cloth masks--  0  Surgical masks--- 50  hand  -- 2  cleaning wipes -- 0  Toilet paper-- 4  RW Part A Provision, to reduce the spread of Covid-19. COVID food vouchers --- NO    Repeat RPR 3 months-- discussed importance of this. Pt verbalized understanding. Next BIC INJ --- 8.12.21    Reviewed other scheduled apts. Pt denies any other issues or concerns that need doctor attention and will call office if needed. Intermediate Repair Preamble Text (Leave Blank If You Do Not Want): Undermining was performed with blunt dissection.

## 2021-08-07 ENCOUNTER — TELEPHONE (OUTPATIENT)
Dept: INFECTIOUS DISEASES | Age: 31
End: 2021-08-07

## 2021-08-07 NOTE — TELEPHONE ENCOUNTER
Per request, CM scheduled transport for appointment 8.9.21 at [facility].  CM assessed need, made arrangements as last resort.

## 2021-08-12 ENCOUNTER — NURSE ONLY (OUTPATIENT)
Dept: INFECTIOUS DISEASES | Age: 31
End: 2021-08-12

## 2021-08-12 DIAGNOSIS — B20 HUMAN IMMUNODEFICIENCY VIRUS (HCC): Primary | ICD-10-CM

## 2021-08-12 DIAGNOSIS — Z86.19 H/O SYPHILIS: ICD-10-CM

## 2021-08-12 NOTE — PROGRESS NOTES
- Pt presents to clinic for scheduled apt. Here today for IM injection of Bicillin. # 2  Allergies reviewed. Pt has had PCN in past with out concerns    Pt has had BIC inj prior. Tolerated well. After obtaining consent, and per orders of Dr. Jodi Avila, injection of Bicillin given in Left and Right upper quad. gluteus by Chantelle Hdz. Patient instructed to remain in clinic for 20 minutes afterwards,   No adverse reaction noted. Patient tolerated well and denies concerns. Patient to call office or go to ER to report any adverse reaction immediately. LOT-- [id number]  EXP- 11/30/2021    Next BIC INJ --- 8.19.21    Reviewed other scheduled apts. Pt states he has a court date coming up Monday. Rn and pt reviewed additional transport options and assistance. It is determined he will use [facility] as last report for help. Aware of policy and  time. [facility] notified. All appts are related to medical treatment and are necessary for compliance     Appt: 8.16.2021.21 [facility] 800 [facility]  [address], [facility], [address]    Denies any needs at this time. Denies any issues that need Doctor attention. Will call with concerns    Requested pt call office if needed.

## 2021-08-18 ENCOUNTER — TELEPHONE (OUTPATIENT)
Dept: INFECTIOUS DISEASES | Age: 31
End: 2021-08-18

## 2021-08-19 ENCOUNTER — NURSE ONLY (OUTPATIENT)
Dept: INFECTIOUS DISEASES | Age: 31
End: 2021-08-19

## 2021-08-19 DIAGNOSIS — Z86.19 H/O SYPHILIS: ICD-10-CM

## 2021-08-19 DIAGNOSIS — B20 HUMAN IMMUNODEFICIENCY VIRUS (HCC): Primary | ICD-10-CM

## 2021-08-19 NOTE — PROGRESS NOTES
- Pt presents to clinic for scheduled apt. Here today for IM injection of Bicillin. # 3  Allergies reviewed. Pt has had PCN in past with out concerns     Pt has had BIC inj prior. Tolerated well. After obtaining consent, and per orders of Dr. Jcarlos Ferreira, injection of Bicillin given in Left and Right upper quad. gluteus by James Skelton. Patient instructed to remain in clinic for 20 minutes afterwards,   No adverse reaction noted. Patient tolerated well and denies concerns. LOT-- [id number]  EXP-- 11.30.21    Patient to call office or go to ER to report any adverse reaction immediately. Reviewed other scheduled apts. Requested pt call office if needed.

## 2021-08-24 ENCOUNTER — TELEPHONE (OUTPATIENT)
Dept: INFECTIOUS DISEASES | Age: 31
End: 2021-08-24

## 2021-08-24 NOTE — TELEPHONE ENCOUNTER
Per request, CM scheduled transport for appointment with [facility] today at [place] [address].  CM assessed need, arrangements as last resort.

## 2021-08-26 ENCOUNTER — TELEPHONE (OUTPATIENT)
Dept: INFECTIOUS DISEASES | Age: 31
End: 2021-08-26

## 2021-08-26 NOTE — TELEPHONE ENCOUNTER
Pt called into office to discuss need for food. States he will not get his food card refill until 9.4.21 and he does not have any extra money. Pt requested help with food via vouchers. Pt does not have transport here today. Rn and pt reviewed additional transport options and assistance. It is determined he will use [facility] as last report for help. Aware of policy and  time. [facility] notified. All appts are related to medical treatment and are necessary for compliance    Appt: 8.27.21 9 am ID office for basic needs     Denies any needs at this time. Denies any issues that need Doctor attention.  Will call with concerns

## 2021-09-07 ENCOUNTER — NURSE ONLY (OUTPATIENT)
Dept: INFECTIOUS DISEASES | Age: 31
End: 2021-09-07

## 2021-09-07 ENCOUNTER — OFFICE VISIT (OUTPATIENT)
Dept: INFECTIOUS DISEASES | Age: 31
End: 2021-09-07
Payer: MEDICAID

## 2021-09-07 VITALS
HEART RATE: 60 BPM | TEMPERATURE: 98.1 F | SYSTOLIC BLOOD PRESSURE: 114 MMHG | BODY MASS INDEX: 43.13 KG/M2 | DIASTOLIC BLOOD PRESSURE: 69 MMHG | WEIGHT: 300.6 LBS

## 2021-09-07 DIAGNOSIS — A52.8 LATE LATENT SYPHILIS: ICD-10-CM

## 2021-09-07 DIAGNOSIS — B20 AIDS (ACQUIRED IMMUNE DEFICIENCY SYNDROME) (HCC): Primary | ICD-10-CM

## 2021-09-07 PROCEDURE — 1036F TOBACCO NON-USER: CPT | Performed by: INTERNAL MEDICINE

## 2021-09-07 PROCEDURE — G8427 DOCREV CUR MEDS BY ELIG CLIN: HCPCS | Performed by: INTERNAL MEDICINE

## 2021-09-07 PROCEDURE — 99214 OFFICE O/P EST MOD 30 MIN: CPT | Performed by: INTERNAL MEDICINE

## 2021-09-07 PROCEDURE — G8417 CALC BMI ABV UP PARAM F/U: HCPCS | Performed by: INTERNAL MEDICINE

## 2021-09-07 ASSESSMENT — ENCOUNTER SYMPTOMS
GASTROINTESTINAL NEGATIVE: 1
RESPIRATORY NEGATIVE: 1

## 2021-09-07 NOTE — PROGRESS NOTES
Pt presented for scheduled appointment with Dr. Reid Manzo. CM met with pt, provided socialization. Cm inquire if pt would consider receiving Covid Vaccine. At this point, pt is skeptical about receiving vaccine. During visit, pt, discussed being on probation for Domestic Violence charge. Pt will need to pay $850.00 in restitution. Pt stated he will do his best to pay fine ASAP, will stop smoking marijuana. CM provided support and encouragement. Pt also discussed concerns regarding with, over 300#s. CM discussed increasing physical activity and stating a diary of what he eats daily for 1 month. Realizing what he heats and the activity in which he engages will give him a better look at where he can improve. Per request, CM will schedule transport for appointment 9/27/21 @ 8am.  Cm assessed need, made arrangements as last resort.

## 2021-09-07 NOTE — PROGRESS NOTES
Pt here for 3 month B20 f/u. States 100% compliance with medications. No misses. States that life is going well, and has been keeping busy with his brother. Provided 1 box surgical face mask, 0 hand , and 4 rolls of toilet paper, RW Part A Provision, to reduce the spread of Covid-19.

## 2021-09-07 NOTE — PROGRESS NOTES
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 HENT: Negative. Respiratory: Negative. Cardiovascular: Negative. Gastrointestinal: Negative. Genitourinary: Negative.         Review of Systems: All 14 review of systems negative other than as stated above    Social History     Tobacco Use    Smoking status: Never Smoker    Smokeless tobacco: Never Used   Vaping Use    Vaping Use: Never used   Substance Use Topics    Alcohol use: No    Drug use: Yes     Types: Marijuana     Comment: smoked 2 weeks ago         Past Medical History:   Diagnosis Date    HIV (human immunodeficiency virus infection) ([facility]     Immune deficiency disorder ([facility]     HIV not in treatment currently    Late latent syphilis              Current Outpatient Medications on File Prior to Visit   Medication Sig Dispense Refill    TRIUMEQ 600- MG TABS TAKE 1 TABLET BY MOUTH DAILY 30 tablet 2    sertraline (ZOLOFT) 50 MG tablet TK 1 T PO QD  1    ziprasidone (GEODON) 20 MG capsule TK 1 C PO BID WITH ZACK AND DINNER  1    neomycin-bacitracin-polymyxin (NEOSPORIN) 400-5-5000 ointment Apply topically 2 times daily. 30 g 1    ibuprofen (ADVIL;MOTRIN) 800 MG tablet Take 1 tablet by mouth every 8 hours as needed for Pain or Fever 15 tablet 0     No current facility-administered medications on file prior to visit. Physical Exam:      Physical Exam  Constitutional:       Appearance: Normal appearance. He is obese. Cardiovascular:      Heart sounds: No murmur heard. No gallop. Pulmonary:      Effort: No respiratory distress. Breath sounds: Normal breath sounds. No wheezing, rhonchi or rales. Abdominal:      General: Abdomen is flat. There is no distension. Palpations: There is no mass. Tenderness: There is no abdominal tenderness. There is no right CVA tenderness, left CVA tenderness, guarding or rebound. Hernia: No hernia is present. Musculoskeletal:         General: No swelling or tenderness. Skin:     General: Skin is warm and dry. Neurological:      Mental Status: He is alert. Blood pressure 114/69, pulse 60, temperature 98.1 °F (36.7 °C), weight (!) 300 lb 9.6 oz (136.4 kg). .   Lab Results   Component Value Date    WBC 4.4 (L) 08/03/2021    HGB 15.1 08/03/2021    HCT 43.5 08/03/2021    MCV 89.5 08/03/2021     08/03/2021     Lab Results   Component Value Date     08/03/2021    K 3.8 08/03/2021     08/03/2021    CO2 27 08/03/2021    BUN 13 08/03/2021    CREATININE 0.84 08/03/2021    GLUCOSE 110 08/03/2021    CALCIUM 9.5 08/03/2021        Component Collected Lab   EER HIV Genotyping 01/09/2019  2:37 PM [facility]   See Note    Comment:   Access [facility] Enhanced Report using either link below:     -Direct access: [email or web address]     -Enter Username, Password: [email or web address]    Username: [email or web address]    Password: [email or web address]   Performed by Jj Barrios   [place] [address], [address] [facility] [phone]   www. Violette Montes MD - Lab.  Director    HIV-1 Genotype 01/09/2019  2:37 PM [facility]   See Note    Comment:     Drug Resistance:   NRTI Drug Class       VIDEX, (didanosine, ddI)                             None     VIREAD, (tenofovir, TDF)                             None     ZERIT, (stavudine, d4T)                              None     ZIAGEN, (abacavir, ABC)                              None     EMTRIVA, (emtricitabine, FTC)                        None     RETROVIR, (zidovudine, ZDV)                          None     EPIVIR, (lamivudine, 3TC)                            None       NRTI drug resistance mutations identified: None     NNRTI Drug Class       SUSTIVA, (efavirenz, EFV)                            None     VIRAMUNE, (nevirapine, NVP)                          None     INTELENCE, (etravirine, ETR)                         None     EDURANT, (rilpivirine, RPV)                          None       NNRTI drug resistance mutations identified: [id number]     PI+ Drug Class       VIRACEPT, (nelfinavir, NFV)                          None     APTIVUS, (tipranavir, TPV)                           None     CRIXIVAN, (indinavir, IDV)                           None     KALETRA, (lopinavir + ritonavir, LPV)                None     REYATAZ, (atazanavir, ATV)                           None     PREZISTA, (darunavir, DRV)                           None     LEXIVA, (fosamprenavir, FPV)                         None     FORTOVASE / INVIRASE, (saquinavir, SQV)              None       PI+ drug resistance mutations identified: None               ASSESSMENT:  Patient Active Problem List   Diagnosis    Asymptomatic HIV infection ([facility]    H/O syphilis    Abscess of lower back         PLAN:    HIV infection stable continue with  Triumeq (Dolutegravir-abacavir-lamivudine)    Late latent syphilis  Completed therapy we will recheck RPR in 3 months

## 2021-09-20 ENCOUNTER — TELEPHONE (OUTPATIENT)
Dept: INFECTIOUS DISEASES | Age: 31
End: 2021-09-20

## 2021-09-20 NOTE — TELEPHONE ENCOUNTER
Pt called in with update. He will get COVID tested tomorrow at [facility]. States he just wants to get test to be safe. C/o congestion. Denies any other COVID s/s    He will have friend take him for test. He will call office with update.

## 2021-10-04 ENCOUNTER — TELEPHONE (OUTPATIENT)
Dept: INFECTIOUS DISEASES | Age: 31
End: 2021-10-04

## 2021-10-04 NOTE — TELEPHONE ENCOUNTER
Pt called into office in need of help with transport for up coming apt. .   Rn and pt reviewed additional transport options and assistance. It is determined he will use pegasus as last report for help. Aware of policy and  time. [facility]asus notified. All appts are related to medical treatment and are necessary for compliance      Appt:   10.5.21 at 1045 to [facility]     Denies any needs at this time. Denies any issues that need Doctor attention.  Will call with concerns

## 2021-10-19 ENCOUNTER — TELEPHONE (OUTPATIENT)
Dept: INFECTIOUS DISEASES | Age: 31
End: 2021-10-19

## 2021-10-20 DIAGNOSIS — Z21 ASYMPTOMATIC HIV INFECTION (HCC): Primary | ICD-10-CM

## 2021-10-21 ENCOUNTER — TELEPHONE (OUTPATIENT)
Dept: INFECTIOUS DISEASES | Age: 31
End: 2021-10-21

## 2021-10-21 NOTE — TELEPHONE ENCOUNTER
Pt called into clinic requesting help with basic needs. Requesting food/gas voucher    Pt into clinic. Orlin updated about stop to clinic. Needs assessed. 2 food vouchers given to pt. PPE provided and discussed with pt. Provided face mask ----  yes  Cloth masks--  0  Surgical masks--- 50  hand  -- 0  cleaning wipes -- 0  Toilet paper-4  RW Part A Provision, to reduce the spread of Covid-19.      COVID food vouchers --- NO    Pt denies any other issues or concerns that need doctor attention and will call office if needed

## 2021-10-25 ENCOUNTER — TELEPHONE (OUTPATIENT)
Dept: INFECTIOUS DISEASES | Age: 31
End: 2021-10-25

## 2021-10-25 NOTE — TELEPHONE ENCOUNTER
Call placed to pt regarding need to update RW eligibility and paperwork. Reviewed all needed documentation for update. This includes proof of income ( 1 month total, most recent) proof of residency and/or ID as well as any insurance updates or changes. Pt will provide income. (no changes)     pt is aware of the program, verbalized understanding and denies questions.      CM apt made 11.9.21 at 11 via phone       Denies other issues or concerns and will call office if needed

## 2021-11-09 ENCOUNTER — NURSE ONLY (OUTPATIENT)
Dept: INFECTIOUS DISEASES | Age: 31
End: 2021-11-09

## 2021-11-09 NOTE — PROGRESS NOTES
Re-Assessment      Patient ID:   Lazaro Ribeiro  Date:   11/9/2021      Client ID:  [id number]    [address]  [address] [place] [address]  [phone] (home)     Family/House:    [] Partner  [] Children  [] Other -     Mental Health:   Hx of MH    Substance Abuse:   Hx of Marijuana Use  Social History     Socioeconomic History    Marital status: Single     Spouse name: Not on file    Number of children: Not on file    Years of education: Not on file    Highest education level: Not on file   Occupational History    Not on file   Tobacco Use    Smoking status: Never Smoker    Smokeless tobacco: Never Used   Vaping Use    Vaping Use: Never used   Substance and Sexual Activity    Alcohol use: No    Drug use: Yes     Types: Marijuana (Weed)     Comment: smoked 2 weeks ago    Sexual activity: Not on file   Other Topics Concern    Not on file   Social History Narrative    Not on file     Social Determinants of Health     Financial Resource Strain:     Difficulty of Paying Living Expenses: Not on file   Food Insecurity:     Worried About Running Out of Food in the Last Year: Not on file    Misti of Food in the Last Year: Not on file   Transportation Needs:     Lack of Transportation (Medical): Not on file    Lack of Transportation (Non-Medical):  Not on file   Physical Activity:     Days of Exercise per Week: Not on file    Minutes of Exercise per Session: Not on file   Stress:     Feeling of Stress : Not on file   Social Connections:     Frequency of Communication with Friends and Family: Not on file    Frequency of Social Gatherings with Friends and Family: Not on file    Attends Cheondoism Services: Not on file    Active Member of Clubs or Organizations: Not on file    Attends Club or Organization Meetings: Not on file    Marital Status: Not on file   Intimate Partner Violence:     Fear of Current or Ex-Partner: Not on file    Emotionally Abused: Not on file    Physically Abused: Not on file    Sexually Abused: Not on file   Housing Stability:     Unable to Pay for Housing in the Last Year: Not on file    Number of Places Lived in the Last Year: Not on file    Unstable Housing in the Last Year: Not on file       Housing:   apartment    Health/Healthcare:  HIV Stable   Physician Visit:  Dr. Tono Hidalgo 9/21   Dental Visit:  Lamberto Murphy 9/21    CD4:   Lab Results   Component Value Date    LABABSO 549 08/03/2021       Viral Load:  Lab Results   Component Value Date    [id number] Not Detected 08/03/2021       Medical Insurance:  Payor: [facility] / Plan: [facility] / Product Type: *No Product type* /    OHDAP/HIPSCA:  NA   Renewal Date:  NA    Income:    SSI    Legal Issues:    Probation for 2 years    Emergency Contact:   Extended Emergency Contact Information  Primary Emergency Contact: Cydney Peguero   [facility] of [facility] Phone: [phone]  Relation: Parent  Secondary Emergency Contact: Darci Crow of [address] [place] [address] Phone: [phone]  Relation: Brother/Sister  Appointment completed via phone. CM called pt for scheduled appointment. CM completed Annual Review, assessed RW Part A Eligibility for services. Pt receives SSI, has Kite. CM reviewed Grievance Policy, Sliding Scale Fee and Transportation Policy with pt. CM completed PSA and SA/MH Assessments. Pt has hx of SA and MH. Pt has stopped marijuana x1 month, receives counseling at [facility]. CM updated ISP, pt agreed with POC. Pt is compliant with care, VL is undetectable. Pt had dental visit 9/21. Pt is sexually active. Pt stated he follows safe sex practices 100%. Pt is aware of U=U, undetectable equals un-detectable. CM provided socialization, pt is currently on probation for 2 years. Pt is hoping to be released in one year. CM provided support and encouragement. Per request, Cm scheduled transport for appointment 11/18/21 @ 8:30Am.   CM assessed need, made arrangements as last resort. CM will follow up with pt as needed.

## 2021-11-17 ENCOUNTER — TELEPHONE (OUTPATIENT)
Dept: INFECTIOUS DISEASES | Age: 31
End: 2021-11-17

## 2021-11-17 NOTE — TELEPHONE ENCOUNTER
Cm scheduled pt for appt transport 11.18.21. pt is requesting to come to office for food voucher after court.    JIMBO and tenzin updated

## 2021-11-18 ENCOUNTER — TELEPHONE (OUTPATIENT)
Dept: INFECTIOUS DISEASES | Age: 31
End: 2021-11-18

## 2021-11-18 NOTE — TELEPHONE ENCOUNTER
pt into clinic requesting help with basic needs. Requesting food/gas voucher    Needs assessed. 2 food vouchers given to pt via [address] noted apt tomorrow  Rn and pt reviewed additional transport options and assistance. It is determined he will use pegasus as last report for help. Aware of policy and  time. [facility]asus notified. All appts are related to medical treatment and are necessary for compliance  This transportation assistance will help patient remain in medical care by enabling them to access medical and support services. Appt: 11.19.21 at 1030 -- [facility]  [address]    Denies any needs at this time. Denies any issues that need Doctor attention.  Will call with concerns

## 2021-12-01 ENCOUNTER — TELEPHONE (OUTPATIENT)
Dept: INFECTIOUS DISEASES | Age: 31
End: 2021-12-01

## 2021-12-01 NOTE — TELEPHONE ENCOUNTER
Pt called into office in need of help with transport for up coming apt. .   Rn and pt reviewed additional transport options and assistance. It is determined he will use pegasus as last report for help. Aware of policy and  time. Pegasus notified. All appts are related to medical treatment and are necessary for compliance  This transportation assistance will help patient remain in medical care by enabling them to access medical and support services. Appt: 12.2.21 at 10 am--[address]    Denies any needs at this time. Denies any issues that need Doctor attention.  Will call with concerns

## 2021-12-03 ENCOUNTER — TELEPHONE (OUTPATIENT)
Dept: INFECTIOUS DISEASES | Age: 31
End: 2021-12-03

## 2021-12-03 NOTE — TELEPHONE ENCOUNTER
Pt called into office in need of help with transport for up coming apt. .   Rn and pt reviewed additional transport options and assistance. It is determined he will use pegasus as last report for help. Aware of policy and  time. Pegasus notified. All appts are related to medical treatment and are necessary for compliance  This transportation assistance will help patient remain in medical care by enabling them to access medical and support services. We discussed importance of him calling right away with any changes or cancellations. Appt:   12.6.21  12.8.21  12.10.21   12.13.21  12.15.21  12.17.21  12.27.21  12.29.21   ALL same appt time -- 1030 --[facility]  [address] -- back doctor          12.7.21  12.14.21  12.21.21  12.28.21  1.4.2022  ALL at same time -- 9 am -- [address] -- legal issues    Denies any needs at this time. Denies any issues that need Doctor attention.  Will call with concerns

## 2021-12-10 ENCOUNTER — TELEPHONE (OUTPATIENT)
Dept: INFECTIOUS DISEASES | Age: 31
End: 2021-12-10

## 2021-12-10 NOTE — TELEPHONE ENCOUNTER
Per request, CM scheduled transport for PT 12/13/21 @ 10AM.  CM assessed need, made arrangements as last resort.

## 2021-12-20 ENCOUNTER — TELEPHONE (OUTPATIENT)
Dept: INFECTIOUS DISEASES | Age: 31
End: 2021-12-20

## 2021-12-20 NOTE — TELEPHONE ENCOUNTER
Per request, CM scheduled transport to appointments for PT 12/22/21 and 12/23/21,  at 10AM  CM assessed need, roberts arrangements as last resort.

## 2021-12-27 ENCOUNTER — TELEPHONE (OUTPATIENT)
Dept: INFECTIOUS DISEASES | Age: 31
End: 2021-12-27

## 2021-12-27 NOTE — TELEPHONE ENCOUNTER
Per request, Cm scheduled transport to appointments for PT 12/30/21; 1/3/22; 1/5/22 and 1/7/22 @ 10AM.  CM assessed need, arrangements as last resort.

## 2021-12-30 ENCOUNTER — TELEPHONE (OUTPATIENT)
Dept: INFECTIOUS DISEASES | Age: 31
End: 2021-12-30

## 2021-12-30 NOTE — TELEPHONE ENCOUNTER
Per request, CM scheduled transport to appointment 12/31/21 @ 9:30Am. Cm assessed need, made arrangements as last resort.

## 2022-01-06 ENCOUNTER — TELEPHONE (OUTPATIENT)
Dept: INFECTIOUS DISEASES | Age: 32
End: 2022-01-06

## 2022-01-06 NOTE — TELEPHONE ENCOUNTER
Per request, CM scheduled transport for appointment with [facility] 1/17/22 @ 1:45PM.  CM assessed need, made arrangements as last resort.

## 2022-01-11 ENCOUNTER — TELEPHONE (OUTPATIENT)
Dept: INFECTIOUS DISEASES | Age: 32
End: 2022-01-11

## 2022-01-11 NOTE — TELEPHONE ENCOUNTER
Pt called, stated appointment scheduled 1/17/22 with [facility] was rescheduled to 1/12/22 @ 2PM.  Per request, CM will reschedule transport. CM assessed need, arrangements as last resort.

## 2022-01-12 ENCOUNTER — TELEPHONE (OUTPATIENT)
Dept: INFECTIOUS DISEASES | Age: 32
End: 2022-01-12

## 2022-01-12 NOTE — TELEPHONE ENCOUNTER
Pt called into clinic requesting help with basic needs. Requesting food/gas voucher. Requesting to come to office after [facility]. Spoke with [employer]. It is ok to add on for pt to come to ID office for food voucher after [facility]. Pt into clin  Needs assessed. 2 food vouchers given to pt via [facility]      Denies any needs at this time. Denies any issues that need Doctor attention.  Will call with concerns

## 2022-01-13 ENCOUNTER — TELEPHONE (OUTPATIENT)
Dept: INFECTIOUS DISEASES | Age: 32
End: 2022-01-13

## 2022-01-13 NOTE — TELEPHONE ENCOUNTER
Pt called into clinic requesting help with basic needs. Requesting food/gas voucher    Pt into clinc  Needs assessed. 2 food vouchers given to pt via [facility]    Discussed upcoming HIV support group. 1.27.22 Norman Gutierrez  Is interested in coming. Pt is in need of help with transportation  Rn and pt reviewed additional transport options and assistance. It is determined he will use pegasus as last report for help. Pegasus notified. Pt aware transport will be there for  about 30 mins prior to apt.       1.27.22 at 1 pm [facility] (Support group)       Denies any needs at this time. Denies any issues that need Doctor attention.  Will call with concerns

## 2022-01-18 ENCOUNTER — TELEPHONE (OUTPATIENT)
Dept: INFECTIOUS DISEASES | Age: 32
End: 2022-01-18

## 2022-01-18 NOTE — TELEPHONE ENCOUNTER
Pt called into office in need of help with transport for up coming apt. .   Rn and pt reviewed additional transport options and assistance. It is determined he will use pegasus as last report for help. Aware of policy and  time. Pegasus notified. All appts are related to medical treatment and are necessary for compliance  This transportation assistance will help patient remain in medical care by enabling them to access medical and support services. Appt: 1.19.22 at 930-- [address]    Denies any needs at this time. Denies any issues that need Doctor attention.  Will call with concerns

## 2022-01-20 ENCOUNTER — TELEPHONE (OUTPATIENT)
Dept: INFECTIOUS DISEASES | Age: 32
End: 2022-01-20

## 2022-01-20 DIAGNOSIS — Z21 ASYMPTOMATIC HIV INFECTION (HCC): ICD-10-CM

## 2022-01-20 DIAGNOSIS — Z86.19 H/O SYPHILIS: Primary | ICD-10-CM

## 2022-01-20 RX ORDER — ABACAVIR SULFATE, DOLUTEGRAVIR SODIUM, LAMIVUDINE 600; 50; 300 MG/1; MG/1; MG/1
TABLET, FILM COATED ORAL
Qty: 30 TABLET | Refills: 4 | Status: SHIPPED | OUTPATIENT
Start: 2022-01-20 | End: 2022-06-08

## 2022-01-20 NOTE — TELEPHONE ENCOUNTER
Call placed to pt in need of new lab work. Rn and pt reviewed additional transport options and assistance. It is determined he will use pegasus as last report for help. Aware of policy and  time. Pegasus notified. All appts are related to medical treatment and are necessary for compliance  This transportation assistance will help patient remain in medical care by enabling them to access medical and support services. Appt:   10 am for [facility] out pt lab. 1.31.22      Denies any needs at this time. Denies any issues that need Doctor attention.  Will call with concerns

## 2022-01-24 ENCOUNTER — TELEPHONE (OUTPATIENT)
Dept: INFECTIOUS DISEASES | Age: 32
End: 2022-01-24

## 2022-01-24 DIAGNOSIS — K62.5 RECTAL BLEEDING: Primary | ICD-10-CM

## 2022-01-24 NOTE — TELEPHONE ENCOUNTER
Pt called into office in need of help with transport for up coming apt. .   Rn and pt reviewed additional transport options and assistance. It is determined he will use pegasus as last report for help. Aware of policy and  time. Pegasus notified. All appts are related to medical treatment and are necessary for compliance  This transportation assistance will help patient remain in medical care by enabling them to access medical and support services. Appt: 1.28.22 at 11 am Dr Rosario Santana [address]    Denies any needs at this time. Denies any issues that need Doctor attention.  Will call with concerns

## 2022-01-24 NOTE — TELEPHONE ENCOUNTER
pt states he noted some blood after wiping after BM today. States this is the 1st time this has happened. Spoke with Dr Suyapa Herman  Per Dr Laina Mayo referral    Pt updated about referral and doctor. Denies questions and will call office if needed.

## 2022-01-26 ENCOUNTER — TELEPHONE (OUTPATIENT)
Dept: INFECTIOUS DISEASES | Age: 32
End: 2022-01-26

## 2022-01-28 ENCOUNTER — TELEPHONE (OUTPATIENT)
Dept: INFECTIOUS DISEASES | Age: 32
End: 2022-01-28

## 2022-01-28 DIAGNOSIS — Z21 ASYMPTOMATIC HIV INFECTION (HCC): ICD-10-CM

## 2022-01-28 DIAGNOSIS — Z86.19 H/O SYPHILIS: ICD-10-CM

## 2022-01-28 LAB
ALBUMIN SERPL-MCNC: 4.3 G/DL (ref 3.5–4.6)
ALP BLD-CCNC: 74 U/L (ref 35–104)
ALT SERPL-CCNC: 29 U/L (ref 0–41)
ANION GAP SERPL CALCULATED.3IONS-SCNC: 12 MEQ/L (ref 9–15)
AST SERPL-CCNC: 25 U/L (ref 0–40)
BILIRUB SERPL-MCNC: <0.2 MG/DL (ref 0.2–0.7)
BUN BLDV-MCNC: 18 MG/DL (ref 6–20)
CALCIUM SERPL-MCNC: 9.2 MG/DL (ref 8.5–9.9)
CHLORIDE BLD-SCNC: 106 MEQ/L (ref 95–107)
CO2: 24 MEQ/L (ref 20–31)
CREAT SERPL-MCNC: 0.83 MG/DL (ref 0.7–1.2)
GFR AFRICAN AMERICAN: >60
GFR NON-AFRICAN AMERICAN: >60
GLOBULIN: 3.6 G/DL (ref 2.3–3.5)
GLUCOSE BLD-MCNC: 92 MG/DL (ref 70–99)
HCT VFR BLD CALC: 41.2 % (ref 42–52)
HEMOGLOBIN: 13.8 G/DL (ref 14–18)
MCH RBC QN AUTO: 29.3 PG (ref 27–31.3)
MCHC RBC AUTO-ENTMCNC: 33.6 % (ref 33–37)
MCV RBC AUTO: 87.4 FL (ref 80–100)
PDW BLD-RTO: 13 % (ref 11.5–14.5)
PLATELET # BLD: 185 K/UL (ref 130–400)
POTASSIUM SERPL-SCNC: 3.6 MEQ/L (ref 3.4–4.9)
RBC # BLD: 4.71 M/UL (ref 4.7–6.1)
SODIUM BLD-SCNC: 142 MEQ/L (ref 135–144)
TOTAL PROTEIN: 7.9 G/DL (ref 6.3–8)
WBC # BLD: 4.9 K/UL (ref 4.8–10.8)

## 2022-01-28 NOTE — TELEPHONE ENCOUNTER
Pt presented for Support Group meeting. 1.27.22 1-230. Pt was an active participant. Diogo Sales RN, from [address] [place] [address] Dept. Was guest speaker. Discussion on Covid Vaccinations. PPE provided via RW Part A Program.  (6TP, 10 masks)  Pt signed up for next Group to be held 2/24/22. Transportation to be scheduled. Need assessed, arrangements as last resort.

## 2022-01-29 LAB
ABSOLUTE CD 4 HELPER: 714 CELLS/UL (ref 430–1800)
CD4 % HELPER T CELL: 23 % (ref 32–64)
LYMPHOCYTE SUBSET PANEL 2 INFO: ABNORMAL

## 2022-01-31 LAB
HIV-1 QNT LOG, IU/ML: NOT DETECTED LOG CPY/ML
HIV-1 QNT, IU/ML: NOT DETECTED CPY/ML
INTERPRETATION: NOT DETECTED
RPR TITER: ABNORMAL

## 2022-02-01 LAB
C. TRACHOMATIS DNA ,URINE: NEGATIVE
N. GONORRHOEAE DNA, URINE: NEGATIVE

## 2022-02-08 ENCOUNTER — TELEPHONE (OUTPATIENT)
Dept: INFECTIOUS DISEASES | Age: 32
End: 2022-02-08

## 2022-02-08 NOTE — TELEPHONE ENCOUNTER
Pt called into office in need of help with transport for up coming apt. .   Rn and pt reviewed additional transport options and assistance. It is determined he will use pegasus as last report for help. Aware of policy and  time. Pegasus notified. All appts are related to medical treatment and are necessary for compliance  This transportation assistance will help patient remain in medical care by enabling them to access medical and support services. Appt:   Dr Juan Markham 2.15.22 at 80 --[facility] 2.16.22 at 9 am --[address] [place] [address] -- legal issues    Denies any needs at this time. Denies any issues that need Doctor attention.  Will call with concerns

## 2022-02-15 ENCOUNTER — OFFICE VISIT (OUTPATIENT)
Dept: SURGERY | Age: 32
End: 2022-02-15
Payer: MEDICAID

## 2022-02-15 VITALS
OXYGEN SATURATION: 99 % | TEMPERATURE: 97.4 F | HEIGHT: 70 IN | BODY MASS INDEX: 45.1 KG/M2 | WEIGHT: 315 LBS | HEART RATE: 88 BPM

## 2022-02-15 DIAGNOSIS — K62.5 RECTAL BLEEDING: Primary | ICD-10-CM

## 2022-02-15 PROCEDURE — G8417 CALC BMI ABV UP PARAM F/U: HCPCS | Performed by: COLON & RECTAL SURGERY

## 2022-02-15 PROCEDURE — G8427 DOCREV CUR MEDS BY ELIG CLIN: HCPCS | Performed by: COLON & RECTAL SURGERY

## 2022-02-15 PROCEDURE — 1036F TOBACCO NON-USER: CPT | Performed by: COLON & RECTAL SURGERY

## 2022-02-15 PROCEDURE — G8484 FLU IMMUNIZE NO ADMIN: HCPCS | Performed by: COLON & RECTAL SURGERY

## 2022-02-15 PROCEDURE — 46600 DIAGNOSTIC ANOSCOPY SPX: CPT | Performed by: COLON & RECTAL SURGERY

## 2022-02-15 PROCEDURE — 99203 OFFICE O/P NEW LOW 30 MIN: CPT | Performed by: COLON & RECTAL SURGERY

## 2022-02-15 ASSESSMENT — ENCOUNTER SYMPTOMS
CONSTIPATION: 0
ANAL BLEEDING: 1
RECTAL PAIN: 0
ABDOMINAL PAIN: 0
SHORTNESS OF BREATH: 0
CHEST TIGHTNESS: 0
VOMITING: 0
DIARRHEA: 0

## 2022-02-15 NOTE — PROGRESS NOTES
Subjective:      Patient ID: Meir Mcgowan is a 27 y.o. male who presents for:  Chief Complaint   Patient presents with    New Patient       This is a 25-year-old male with HIV who is seen through the infectious disease service for management. He has had rectal bleeding on 1-2 episodes in the past few months. He was referred for evaluation. He denies abdominal pain or any unintentional weight loss. Past Medical History:   Diagnosis Date    HIV (human immunodeficiency virus infection) ([facility]     Immune deficiency disorder ([facility]     HIV not in treatment currently    Late latent syphilis      History reviewed. No pertinent surgical history. Social History     Socioeconomic History    Marital status: Single     Spouse name: Not on file    Number of children: Not on file    Years of education: Not on file    Highest education level: Not on file   Occupational History    Not on file   Tobacco Use    Smoking status: Never Smoker    Smokeless tobacco: Never Used   Vaping Use    Vaping Use: Never used   Substance and Sexual Activity    Alcohol use: No    Drug use: Yes     Types: Marijuana (Weed)     Comment: smoked 2 weeks ago    Sexual activity: Not on file   Other Topics Concern    Not on file   Social History Narrative    Not on file     Social Determinants of Health     Financial Resource Strain:     Difficulty of Paying Living Expenses: Not on file   Food Insecurity:     Worried About [address] in the Last Year: Not on file    [address] [facility] [address] in the Last Year: Not on file   Transportation Needs:     Lack of Transportation (Medical): Not on file    Lack of Transportation (Non-Medical):  Not on file   Physical Activity:     Days of Exercise per Week: Not on file    Minutes of Exercise per Session: Not on file   Stress:     Feeling of Stress : Not on file   Social Connections:     Frequency of Communication with Friends and Family: Not on file    Frequency of Social distress. Breath sounds: Normal breath sounds. No wheezing. Abdominal:      General: There is no distension. Palpations: Abdomen is soft. Tenderness: There is no abdominal tenderness. There is no guarding. Genitourinary:     Comments: I attempted anoscopy but his gluteal cleft is so deep that it is very difficult to get any appreciation of his anus. From what I saw during anoscopy there were no marked abnormalities. Musculoskeletal:         General: No tenderness. Normal range of motion. Cervical back: Normal range of motion. Skin:     General: Skin is warm and dry. Findings: No erythema or rash. Neurological:      Mental Status: He is alert and oriented to person, place, and time. Psychiatric:         Behavior: Behavior normal.         Thought Content: Thought content normal.         Judgment: Judgment normal.              Assessment/Plan:          Diagnosis Orders   1. Rectal bleeding       Given the inability to perform appropriate anoscopy given the size and depth of his gluteal cleft, I described the risks and benefits of colonoscopy for better evaluation of his rectal bleeding. Risks and benefits explained. Bowel prep instructions given. Consent obtained. Please note this report has beenpartially produced using speech recognition software and may cause contain errors related to that system including grammar, punctuation and spelling as well as words and phrases that may seem inappropriate.  If there arequestions or concerns please feel free to contact me to clarify

## 2022-02-18 ENCOUNTER — TELEPHONE (OUTPATIENT)
Dept: INFECTIOUS DISEASES | Age: 32
End: 2022-02-18

## 2022-02-18 NOTE — TELEPHONE ENCOUNTER
Pt called in stating he needed to cancel transport due to time change of procedure. Pt has colonoscopy 2.21.22 now at 0 am. Pt will have his brother take him to this apt. pt will call with other needed transport as last resort  Pt will call with updates or further issues. Call placed to [facility]. Transport canceled with in appriate time frame.

## 2022-02-20 ENCOUNTER — ANESTHESIA EVENT (OUTPATIENT)
Dept: OPERATING ROOM | Age: 32
End: 2022-02-20
Payer: MEDICAID

## 2022-02-21 ENCOUNTER — ANESTHESIA (OUTPATIENT)
Dept: OPERATING ROOM | Age: 32
End: 2022-02-21
Payer: MEDICAID

## 2022-02-21 ENCOUNTER — HOSPITAL ENCOUNTER (OUTPATIENT)
Age: 32
Setting detail: OUTPATIENT SURGERY
Discharge: HOME | End: 2022-02-21
Attending: COLON & RECTAL SURGERY | Admitting: COLON & RECTAL SURGERY
Payer: MEDICAID

## 2022-02-21 VITALS
BODY MASS INDEX: 45.1 KG/M2 | HEART RATE: 78 BPM | SYSTOLIC BLOOD PRESSURE: 132 MMHG | RESPIRATION RATE: 16 BRPM | OXYGEN SATURATION: 99 % | WEIGHT: 315 LBS | DIASTOLIC BLOOD PRESSURE: 65 MMHG | TEMPERATURE: 98.5 F | HEIGHT: 70 IN

## 2022-02-21 LAB — SARS-COV-2, NAAT: NOT DETECTED

## 2022-02-21 PROCEDURE — 87635 SARS-COV-2 COVID-19 AMP PRB: CPT

## 2022-02-21 PROCEDURE — 2580000003 HC RX 258: Performed by: STUDENT IN AN ORGANIZED HEALTH CARE EDUCATION/TRAINING PROGRAM

## 2022-02-21 RX ORDER — ONDANSETRON 2 MG/ML
4 INJECTION INTRAMUSCULAR; INTRAVENOUS
Status: DISCONTINUED | OUTPATIENT
Start: 2022-02-21 | End: 2022-02-21 | Stop reason: HOSPADM

## 2022-02-21 RX ORDER — LIDOCAINE HYDROCHLORIDE 10 MG/ML
1 INJECTION, SOLUTION EPIDURAL; INFILTRATION; INTRACAUDAL; PERINEURAL
Status: DISCONTINUED | OUTPATIENT
Start: 2022-02-21 | End: 2022-02-21 | Stop reason: HOSPADM

## 2022-02-21 RX ORDER — POLYETHYLENE GLYCOL 3350, SODIUM SULFATE ANHYDROUS, SODIUM BICARBONATE, SODIUM CHLORIDE, POTASSIUM CHLORIDE 236; 22.74; 6.74; 5.86; 2.97 G/4L; G/4L; G/4L; G/4L; G/4L
4 POWDER, FOR SOLUTION ORAL ONCE
Qty: 4000 ML | Refills: 0 | Status: SHIPPED | OUTPATIENT
Start: 2022-02-21 | End: 2022-02-21

## 2022-02-21 RX ORDER — SODIUM CHLORIDE, SODIUM LACTATE, POTASSIUM CHLORIDE, CALCIUM CHLORIDE 600; 310; 30; 20 MG/100ML; MG/100ML; MG/100ML; MG/100ML
INJECTION, SOLUTION INTRAVENOUS CONTINUOUS
Status: DISCONTINUED | OUTPATIENT
Start: 2022-02-21 | End: 2022-02-21 | Stop reason: HOSPADM

## 2022-02-21 RX ADMIN — SODIUM CHLORIDE, POTASSIUM CHLORIDE, SODIUM LACTATE AND CALCIUM CHLORIDE: 600; 310; 30; 20 INJECTION, SOLUTION INTRAVENOUS at 06:41

## 2022-02-21 NOTE — ANESTHESIA PRE PROCEDURE
Department of Anesthesiology  Preprocedure Note       Name:  Angelica Drew   Age:  27 y.o.  :  1990                                          MRN:  [id number]         Date:  2022      Surgeon: Loreto Nye):  Yuki Avila MD    Procedure: Procedure(s):  COLONOSCOPY, PAT ON ADMIT    Medications prior to admission:   Prior to Admission medications    Medication Sig Start Date End Date Taking? Authorizing Provider   TRIUMEQ 600- MG TABS TAKE 1 TABLET BY MOUTH DAILY 22   Bernadine Pierce MD   sertraline (ZOLOFT) 50 MG tablet TK 1 T PO QD 3/26/19   Historical Provider, MD   ziprasidone (GEODON) 20 MG capsule TK 1 C PO BID WITH ZACK AND DINNER 3/27/19   Historical Provider, MD   ibuprofen (ADVIL;MOTRIN) 800 MG tablet Take 1 tablet by mouth every 8 hours as needed for Pain or Fever 10/3/18   Jay Jay Brush PA-C       Current medications:    Current Facility-Administered Medications   Medication Dose Route Frequency Provider Last Rate Last Admin    lactated ringers infusion   IntraVENous Continuous Regino Ryan,  mL/hr at 22 0641 New Bag at 22 7153       Allergies:  No Known Allergies    Problem List:    Patient Active Problem List   Diagnosis Code    Asymptomatic HIV infection ([facility] Z21    H/O syphilis Z86.19    Abscess of lower back L02.212       Past Medical History:        Diagnosis Date    [facility] immunodeficiency virus [facility]     Immune deficiency disorder ([facility]     HIV not in treatment currently    Late latent syphilis        Past Surgical History:  No past surgical history on file. Social History:    Social History     Tobacco Use    Smoking status: Never Smoker    Smokeless tobacco: Never Used   Substance Use Topics    Alcohol use:  No                                Counseling given: Not Answered      Vital Signs (Current):   Vitals:    22 0631   BP: 132/65   Pulse: 78   Resp: 16   Temp: 98.5 °F (36.9 °C)   SpO2: 99%   Weight: (!) 327 lb (148.3 kg)   Height: 5' 10\" (1.778 m)                                              BP Readings from Last 3 Encounters:   02/21/22 132/65   09/07/21 114/69   01/13/21 125/73       NPO Status: Time of last liquid consumption: 1800                        Time of last solid consumption: 1800                        Date of last liquid consumption: 02/20/22                        Date of last solid food consumption: 02/20/22    BMI:   Wt Readings from Last 3 Encounters:   02/21/22 (!) 327 lb (148.3 kg)   02/15/22 (!) 327 lb (148.3 kg)   09/07/21 (!) 300 lb 9.6 oz (136.4 kg)     Body mass index is 46.92 kg/m². CBC:   Lab Results   Component Value Date    WBC 4.9 01/28/2022    RBC 4.71 01/28/2022    HGB 13.8 01/28/2022    HCT 41.2 01/28/2022    MCV 87.4 01/28/2022    RDW 13.0 01/28/2022     01/28/2022       CMP:   Lab Results   Component Value Date     01/28/2022    K 3.6 01/28/2022     01/28/2022    CO2 24 01/28/2022    BUN 18 01/28/2022    CREATININE 0.83 01/28/2022    GFRAA >60.0 01/28/2022    LABGLOM >60.0 01/28/2022    GLUCOSE 92 01/28/2022    PROT 7.9 01/28/2022    CALCIUM 9.2 01/28/2022    BILITOT <0.2 01/28/2022    ALKPHOS 74 01/28/2022    AST 25 01/28/2022    ALT 29 01/28/2022       POC Tests: No results for input(s): POCGLU, POCNA, POCK, POCCL, POCBUN, POCHEMO, POCHCT in the last 72 hours.     Coags: No results found for: PROTIME, INR, APTT    HCG (If Applicable): No results found for: PREGTESTUR, PREGSERUM, HCG, HCGQUANT     ABGs: No results found for: PHART, PO2ART, LVD7IDG, LGP9IJQ, BEART, M9XLDJKW     Type & Screen (If Applicable):  No results found for: LABABO, LABRH    Drug/Infectious Status (If Applicable):  No results found for: HIV, HEPCAB    COVID-19 Screening (If Applicable): No results found for: COVID19        Anesthesia Evaluation  Patient summary reviewed and Nursing notes reviewed no history of anesthetic complications:   Airway: Mallampati: II  TM distance: >3 FB   Neck ROM: full  Mouth opening: > = 3 FB Dental: normal exam         Pulmonary:Negative Pulmonary ROS and normal exam                               Cardiovascular:Negative CV ROS  Exercise tolerance: good (>4 METS),            Beta Blocker:  Not on Beta Blocker         Neuro/Psych:   Negative Neuro/Psych ROS              GI/Hepatic/Renal: Neg GI/Hepatic/Renal ROS  (+) bowel prep, morbid obesity         ROS comment: BMI 47.   Endo/Other: Negative Endo/Other ROS             Pt had PAT visit. ROS comment: HIV + Abdominal:             Vascular: negative vascular ROS. Other Findings:           Anesthesia Plan      MAC     ASA 2       Induction: intravenous. Anesthetic plan and risks discussed with patient. Plan discussed with CRNA.     Attending anesthesiologist reviewed and agrees with Preprocedure content              [facility],    2/21/2022

## 2022-03-02 ENCOUNTER — TELEPHONE (OUTPATIENT)
Dept: INFECTIOUS DISEASES | Age: 32
End: 2022-03-02

## 2022-03-02 NOTE — TELEPHONE ENCOUNTER
Call placed to pt to discuss up coming support group. Date 3.29.22    Discussed upcoming HIV support group. Pt Is interested in coming. Pt is in need of help with transportation  Rn and pt reviewed additional transport options and assistance. It is determined he will use [facility] as last report for help. [facility] notified. Pt aware transport will be there for  about 30 mins prior to apt. Denies questions and will call with concerns. Denies any needs at this time. Denies any issues that need Doctor attention.  Will call with concerns

## 2022-03-07 ENCOUNTER — TELEPHONE (OUTPATIENT)
Dept: INFECTIOUS DISEASES | Age: 32
End: 2022-03-07

## 2022-03-07 NOTE — TELEPHONE ENCOUNTER
Pt called into office in need of help with transport for up coming apt. .   Rn and pt reviewed additional transport options and assistance. It is determined he will use [facility] as last report for help. Aware of policy and  time. [facility] notified. All appts are related to medical treatment and are necessary for compliance  This transportation assistance will help patient remain in medical care by enabling them to access medical and support services. Appt: Legal 3.8.22 at 8 am --[address] -- legal issues    Denies any needs at this time. Denies any issues that need Doctor attention.  Will call with concerns

## 2022-03-07 NOTE — TELEPHONE ENCOUNTER
Pt called into office in need of help with transport for up coming apt. Rn and pt reviewed additional transport options and assistance. It is determined he will use [facility] as last report for help. Aware of policy and  time. [facility] notified. All appts are related to medical treatment and are necessary for compliance  This transportation assistance will help patient remain in medical care by enabling them to access medical and support services. Appt: 3.9.22. at 9 AM [facility]    R/S pt apt with ID from tomorrow to 3.18.22 V/V with Dr Mcclure Organ per pt request.     Denies any needs at this time. Denies any issues that need Doctor attention.  Will call with concerns

## 2022-03-14 ENCOUNTER — TELEPHONE (OUTPATIENT)
Dept: INFECTIOUS DISEASES | Age: 32
End: 2022-03-14

## 2022-03-14 NOTE — TELEPHONE ENCOUNTER
Pt called into office in need of help with transport for up coming apt. .   Rn and pt reviewed additional transport options and assistance. It is determined he will use [facility] as last report for help. Aware of policy and  time. [facility] notified. All appts are related to medical treatment and are necessary for compliance  This transportation assistance will help patient remain in medical care by enabling them to access medical and support services. Appt: Legal 3.16.22 at 8 am --[address] -- legal issues    Denies any needs at this time. Denies any issues that need Doctor attention.  Will call with concerns

## 2022-03-18 ENCOUNTER — TELEMEDICINE (OUTPATIENT)
Dept: INFECTIOUS DISEASES | Age: 32
End: 2022-03-18
Payer: MEDICAID

## 2022-03-18 ENCOUNTER — TELEPHONE (OUTPATIENT)
Dept: INFECTIOUS DISEASES | Age: 32
End: 2022-03-18

## 2022-03-18 DIAGNOSIS — Z28.9: ICD-10-CM

## 2022-03-18 DIAGNOSIS — K62.5 RECTAL BLEEDING: ICD-10-CM

## 2022-03-18 DIAGNOSIS — Z86.19 H/O SYPHILIS: ICD-10-CM

## 2022-03-18 DIAGNOSIS — Z21 ASYMPTOMATIC HIV INFECTION (HCC): Primary | ICD-10-CM

## 2022-03-18 DIAGNOSIS — Z28.310: ICD-10-CM

## 2022-03-18 DIAGNOSIS — Z86.19 H/O SYPHILIS: Primary | ICD-10-CM

## 2022-03-18 DIAGNOSIS — A52.8 LATE LATENT SYPHILIS: ICD-10-CM

## 2022-03-18 PROCEDURE — G8484 FLU IMMUNIZE NO ADMIN: HCPCS | Performed by: INTERNAL MEDICINE

## 2022-03-18 PROCEDURE — G8428 CUR MEDS NOT DOCUMENT: HCPCS | Performed by: INTERNAL MEDICINE

## 2022-03-18 PROCEDURE — 99214 OFFICE O/P EST MOD 30 MIN: CPT | Performed by: INTERNAL MEDICINE

## 2022-03-18 PROCEDURE — G8417 CALC BMI ABV UP PARAM F/U: HCPCS | Performed by: INTERNAL MEDICINE

## 2022-03-18 PROCEDURE — 1036F TOBACCO NON-USER: CPT | Performed by: INTERNAL MEDICINE

## 2022-03-18 NOTE — TELEPHONE ENCOUNTER
Call placed to pt as V/V follow up. New orders reviewed with pt-- repeat RPR in 6 weeks. We discussed and set day 5.2.22   Rn and pt reviewed additional transport options and assistance. It is determined that pt will need transport assistance as last resort. Aware of policy and  time. [facility] notified. All appts are related to medical treatment and are necessary for compliance    This transportation assistance will help patient remain in medical care by enabling them to access medical and support services. Appt: 5.22.22 at [facility] out pt lab    Discussed compliance --- no issues. Doing good. Pt has no questions regarding apt. 5-6 month follow up able to be scheduled. Discussed the need for lab work to be completed 1 week prior to next apt. Denies any other needs or acute issues that need doctor attention.

## 2022-03-18 NOTE — PROGRESS NOTES
3/18/2022    TELEHEALTH EVALUATION -- Audio/Visual (During [facility] public health emergency)      Jigar Lucero (:  1990) has requested an audio/video evaluation and was evaluated through a real time audio-video encounter. The patient ( or guardian if applicable ) is aware that this is a billable service, which includes applicable co-pays. The virtual visit was conducted with the patient's ( or legal guardian's if applicable ) consent. The visit was conducted pursuant to the emergency declaration under the [address] [facility], [address] [place] [address] authority and the [employer] Act. Patient identification was verified, and a caregiver was present when appropriate. The patient was located in a state where the provider was licensed to provide care. The patient was located at an establishment in a state where the provider was licensed to provide care. Due to this being a TeleHealth encounter, evaluation of the following organ systems is limited: Vitals/Constitutional/EENT/Resp/CV/GI//MS/Neuro/Skin/Heme-Lymph-Imm.}  Infectious Disease Progress Note       3/18/2022    Patient is a followup regarding HIV  Subjectively, no new complaints at this time. Diagnosed , had a random test  Acquired by MSM  Has not taken flu or covid vaccine  Has not taken Tdap in at least past 8 years  Recently saw surgeon for rectal bleeding - feels ok now. No complaints. Lab Results   Component Value Date    WBC 4.9 2022    HGB 13.8 (L) 2022    HCT 41.2 (L) 2022    MCV 87.4 2022     2022     Lab Results   Component Value Date     2022    K 3.6 2022     2022    CO2 24 2022    BUN 18 2022    CREATININE 0.83 2022    GLUCOSE 92 2022    CALCIUM 9.2 2022        WBC trends are being monitored. Antibiotic doses are being adjusted per most recent renal labs. Since we cannot conduct an in-person exam, the following were addressed with the patient to the best of my capability via virtual visit:   Patient does not perceive any new visual deficits  No diaphoresis or flushing in the face  Patient is able to flex and extend her neck with ease. Patient can inhale and exhale without any difficulty and chest seems to be expanding symmetrically. No conversational dyspnea. Patient does not feel any palpitations   Patient's  abdomen is not protruberant beyond their normal size. No new swelling of their joints.   No observed neurological changes or slurred speech when speaking to the patient    No new skin rash or ulcers      Patient Active Problem List   Diagnosis    Asymptomatic HIV infection ([facility]    H/O syphilis    Abscess of lower back     HIV Genotyping 01/09/2019     Drug Resistance:   NRTI Drug Class       VIDEX, (didanosine, ddI)                             None     VIREAD, (tenofovir, TDF)                             None     ZERIT, (stavudine, d4T)                              None     ZIAGEN, (abacavir, ABC)                              None     EMTRIVA, (emtricitabine, FTC)                        None     RETROVIR, (zidovudine, ZDV)                          None     EPIVIR, (lamivudine, 3TC)                            None       NRTI drug resistance mutations identified: None     NNRTI Drug Class       SUSTIVA, (efavirenz, EFV)                            None     VIRAMUNE, (nevirapine, NVP)                          None     INTELENCE, (etravirine, ETR)                         None     EDURANT, (rilpivirine, RPV)                          None      NNRTI drug resistance mutations identified: [id number]     PI+ Drug Class       VIRACEPT, (nelfinavir, NFV)                          None     APTIVUS, (tipranavir, TPV)                           None     CRIXIVAN, (indinavir, IDV)                           None     KALETRA, (lopinavir + ritonavir, LPV)                None   REYATAZ, (atazanavir, ATV)                           None     PREZISTA, (darunavir, DRV)                           None     LEXIVA, (fosamprenavir, FPV)                         None     FORTOVASE / INVIRASE, (saquinavir, SQV)              None       PI+ drug resistance mutations identified: None         ASSESSMENT:  HIV infection without an AIDS defining illness  Late latent syphilis ( 8/3/21 titer 1: 128, now down to 1:32) treated. No headaches or visual issues. PLAN:  Triumeq  Monitor syphilis titer - check again in 6 weeks. Encourage compliance. Follow up in 6 months. Encouraged vaccines  Follow up at least annual with PCP, dental, and eye doc. Discussed need for primary care doctor. Imaging and labs were reviewed per medical records and any ID pertinent labs were also addressed  Time spent today in combination of reviewing patient's chart, medications, labs, pictures when pertinent, provider communication as necessary, counseling patient, care/coordination not otherwise reported here, and patient face to face virtual visit 30 min.   >50% of that time spent counseling and coordination of patient care  Addressed preventive measures such as vaccinations, the importance of annual exam with the PCP, and the importance of health maintenance by dietary and exercise regimens. All questions were answered from an ID perspective and to the best of my ability. Social distancing measures, the importance of face masks that properly cover the nose and mouth in public, and proper hand hygiene will continue to be addressed    Risks and benefits of ID prescribed medications were discussed with patient or supporting staff caring for the patient as appropriate and feedback obtained.      Екатерина Sharma, DO

## 2022-03-21 ENCOUNTER — TELEPHONE (OUTPATIENT)
Dept: INFECTIOUS DISEASES | Age: 32
End: 2022-03-21

## 2022-03-21 NOTE — TELEPHONE ENCOUNTER
Pt came into clinic, requesting help with basic needs. Requesting food vouchers. Needs assessed. 2 food vouchers given to pt. Denies other needs at this time. Will call with any questions or concerns.

## 2022-03-21 NOTE — TELEPHONE ENCOUNTER
Pt called into office in need of help with transport for up coming apt. .   Rn and pt reviewed additional transport options and assistance. It is determined that pt will need transport assistance as last resort. Aware of policy and  time. [facility] notified. All appts are related to medical treatment and are necessary for compliance    This transportation assistance will help patient remain in medical care by enabling them to access medical and support services. Appt: [facility] 3.22.22 at 1    Denies any needs at this time. Denies any issues that need Doctor attention.  Will call with concerns

## 2022-03-29 ENCOUNTER — TELEPHONE (OUTPATIENT)
Dept: INFECTIOUS DISEASES | Age: 32
End: 2022-03-29

## 2022-04-01 ENCOUNTER — TELEPHONE (OUTPATIENT)
Dept: INFECTIOUS DISEASES | Age: 32
End: 2022-04-01

## 2022-04-01 NOTE — TELEPHONE ENCOUNTER
Call placed to pt to discuss up coming support group. Date 4.28.22    Discussed upcoming HIV support group. Is interested in coming. Pt is in need of help with transportation  Rn and pt reviewed additional transport options and assistance. It is determined he will use [facility] as last report for help. [facility] notified. Pt aware transport will be there for  about 30 mins prior to apt. Denies any needs at this time. Denies any issues that need Doctor attention.  Will call with concerns

## 2022-04-05 DIAGNOSIS — Z86.19 H/O SYPHILIS: Primary | ICD-10-CM

## 2022-04-05 DIAGNOSIS — Z86.19 H/O SYPHILIS: ICD-10-CM

## 2022-04-06 LAB
RPR TITER: NORMAL
RPR: REACTIVE

## 2022-04-14 ENCOUNTER — TELEPHONE (OUTPATIENT)
Dept: INFECTIOUS DISEASES | Age: 32
End: 2022-04-14

## 2022-04-14 NOTE — TELEPHONE ENCOUNTER
Pt called into clinic requesting help with basic needs. Requesting food/gas voucher  Needs assessed. RN and patient also reviewed additional transportation options/assistance. This transportation assistance will help patient remain in medical care by enabling them to access medical and support services. 4.15.22 to ID office at 9 am    Denies any needs at this time. Denies any issues that need Doctor attention.  Will call with concerns

## 2022-04-15 ENCOUNTER — TELEPHONE (OUTPATIENT)
Dept: INFECTIOUS DISEASES | Age: 32
End: 2022-04-15

## 2022-04-19 ENCOUNTER — TELEPHONE (OUTPATIENT)
Dept: INFECTIOUS DISEASES | Age: 32
End: 2022-04-19

## 2022-04-19 NOTE — TELEPHONE ENCOUNTER
PPt called into office in need of help with transport for up coming apt. .   Rn and pt reviewed additional transport options and assistance. It is determined that pt will need transport assistance as last resort. Aware of policy and  time. [facility] notified. All appts are related to medical treatment and are necessary for compliance    This transportation assistance will help patient remain in medical care by enabling them to access medical and support services. Appt: 4.20.22 at 830 --[facility] [address] -- legal issues    Denies any needs at this time. Denies any issues that need Doctor attention.  Will call with concerns

## 2022-04-25 ENCOUNTER — TELEPHONE (OUTPATIENT)
Dept: INFECTIOUS DISEASES | Age: 32
End: 2022-04-25

## 2022-04-25 NOTE — TELEPHONE ENCOUNTER
Call placed to pt to discuss cancellation of Aprils support group.   pt verbalized understanding. Will reach out to pt once date is verified for May support group.   transportation canceled.

## 2022-04-25 NOTE — TELEPHONE ENCOUNTER
Spoke with CM. She prefers to keep support group. She will make herself available. Pt updated and transportation updated.

## 2022-04-27 ENCOUNTER — TELEPHONE (OUTPATIENT)
Dept: INFECTIOUS DISEASES | Age: 32
End: 2022-04-27

## 2022-04-28 ENCOUNTER — TELEPHONE (OUTPATIENT)
Dept: INFECTIOUS DISEASES | Age: 32
End: 2022-04-28

## 2022-04-28 NOTE — TELEPHONE ENCOUNTER
Physical Therapy Daily Treatment    Visit Count: 14                   Plan of Care: 6/13/2019 Through:Â 9/5/2019  Insurance Information:Â physical and speech therapy combined cap of $2040 per calendar year  Referred by:ALPESH Moody MD; Next provider visit (if known/scheduled):Â 7/2/2019  Medical Diagnosis (from order): V43.64 (ICD-9-CM) - X67.356 (ICD-10-CM) - Status post left hip replacement  719.45 (ICD-9-CM) - M25.552 (ICD-10-CM) - Left hip pain  Date of Surgery:Â 5/20/2019Â Surgery Performed: Left Arthroplasty Hip Total - Left  Hip precaution continue per last MD check 7/2/2019 x 3 months PO date  Â   SUBJECTIVE   Was able to pull weeds and do some yard work. I was a little sore. But am ok now. Feel about 80% better since start of therapy. Current Pain (0-10 scale): 3  Functional Change: Working in yard. Â   OBJECTIVE   Pt with greater ease of transfers. Â   Treatment   Therapeutic Exercise:  Nustep seat 8 level 5 x 10 min    Hamstring curls 30# 3 x 10  Â   Magnum 10# 10 x 2 each  Lat  Row     Calf stretch off step 3 x 10    Neuro re-ed:  Weighted box:  Carry 11.5# box 160' x 4  Push/pull 21.5# box 1 min x 2 on plinth    Functional stretching: Forward walking lunges with toe kicks  Lateral walking lunges  Airplanes  12' x 6      Skilled input: verbal instruction/cues, tactile instruction/cues, education/instruction on home program progression. Dynamic stretching. Home Program:   Exercises   Â· Supine Single Bent Knee Fallout - 10 reps - 10 hold - 1x daily - 7x weekly   Â· Phase 1+2 hip exercises  Â· Added:  Functional stretching:   Forward walking lunges with toe kicks  Lateral walking lunges  Airplanes  12' x 4  Â   Writer verbally educated the patient and received verbal consent from the patient on hand placement, positioning of patient, and techniques to be performed today including clothing adjustments for techniques, exercise progression as described above and how they are pertinent to the patient's plan of Pt presented for Support Group Meeting, was an active participant. Discussion was regarding death of a mother (Mother's Day on 5/8/22) adjustments and coping with loss. In addition, information was shared about programs under insurance companies,iI.e., OTC items, fruits and vegetables, and assistance with rent and utilities via [employer] and other community agencies. Pt expressed interest in attending next Group meeting 5/26/22. Per request, CM scheduled transport. Need assessed, arrangements as last resort. care.      Suggestions for next session as indicated: progress per plan of care, review home exercise program and condense. Pt has one more visit scheduled Plan for D/C at that time. Â   ASSESSMENT   Pt has done very well in therapy reporting 80% improvement since start of therapy. She is compliant with home program and continues to report increased function. Pt has one more scheduled visit. Thus feel she could benefit from review and progression of home program as well as most likely D/C at that time.     Pain after treatment (patient reported, 0-10 scale): 1  Result of above outlined education: Verbalizes understanding and Demonstrates understanding   Â   Â   THERAPY DAILY BILLING   Insurance: MEDICARE [employer]  Â Evaluation Procedures:  No evaluation codes were used on this date of service    Timed Procedures:  Neuromuscular Re-Education, 25 minutes  Therapeutic Exercise, 20 minutes    Untimed Procedures:  No untimed codes were used on this date of service    Total Treatment Time: 45 minutes    G-Code:  G-Code Score ABN form  reporting not required this treatment session  Modifier based on outcome measure(s)/functional testing/clinical judgement as listed above

## 2022-04-29 DIAGNOSIS — Z21 ASYMPTOMATIC HIV INFECTION (HCC): Primary | ICD-10-CM

## 2022-05-02 ENCOUNTER — TELEPHONE (OUTPATIENT)
Dept: INFECTIOUS DISEASES | Age: 32
End: 2022-05-02

## 2022-05-25 ENCOUNTER — TELEPHONE (OUTPATIENT)
Dept: INFECTIOUS DISEASES | Age: 32
End: 2022-05-25

## 2022-05-25 NOTE — TELEPHONE ENCOUNTER
CM called pt to remind him of transport to Support Group Meeting 5/26/22.   Pt stated he is not feeling well, per request, CM will cancel transport arrangements

## 2022-06-01 ENCOUNTER — TELEPHONE (OUTPATIENT)
Dept: INFECTIOUS DISEASES | Age: 32
End: 2022-06-01

## 2022-06-01 NOTE — TELEPHONE ENCOUNTER
Pt called into office in need of help with transport for up coming apt. .   Rn and pt reviewed additional transport options and assistance. It is determined that pt will need transport assistance as last resort. Aware of policy and  time. [facility] notified. All appts are related to medical treatment and are necessary for compliance    This transportation assistance will help patient remain in medical care by enabling them to access medical and support services. Appt: 6.2.22 at 9.  at 1 -- [address] -- legal issues    Denies any needs at this time. Denies any issues that need Doctor attention.  Will call with concerns

## 2022-06-08 RX ORDER — ABACAVIR SULFATE, DOLUTEGRAVIR SODIUM, LAMIVUDINE 600; 50; 300 MG/1; MG/1; MG/1
TABLET, FILM COATED ORAL
Qty: 30 TABLET | Refills: 3 | Status: SHIPPED | OUTPATIENT
Start: 2022-06-08 | End: 2022-09-28

## 2022-06-28 ENCOUNTER — TELEPHONE (OUTPATIENT)
Dept: INFECTIOUS DISEASES | Age: 32
End: 2022-06-28

## 2022-06-28 NOTE — TELEPHONE ENCOUNTER
Call placed to pt as appt reminder. He  also needs lab work completed. Needs repeat RPR  Titer 1:32 then decreased to 1:16    Reviewed labs ordered. He will go to [facility] out pt lab   Rn and pt reviewed additional transport options and assistance. It is determined that pt will need transport assistance as last resort. Aware of policy and  time. [facility] notified. All appts are related to medical treatment and are necessary for compliance    This transportation assistance will help patient remain in medical care by enabling them to access medical and support services. Appt: 7.21.22  at 9    Reviewed appt date and time. Denies issues with appt day. Requested he call if issues arise regarding apt. Denies other needs or concerns that Dr Dannielle Cortez needs to address.

## 2022-07-11 ENCOUNTER — TELEPHONE (OUTPATIENT)
Dept: INFECTIOUS DISEASES | Age: 32
End: 2022-07-11

## 2022-07-11 NOTE — TELEPHONE ENCOUNTER
Pt called into office in need of help with transport for up coming apt. .   Rn and pt reviewed additional transport options and assistance. It is determined that pt will need transport assistance as last resort. Aware of policy and  time. [facility] notified. All appts are related to medical treatment and are necessary for compliance    This transportation assistance will help patient remain in medical care by enabling them to access medical and support services. Appt:   Legal -- 7.21.22 at 10 am--[id number] [facility] -- 7.14.22 at 10 am    Denies any needs at this time. Denies any issues that need Doctor attention.  Will call with concerns

## 2022-07-12 ENCOUNTER — TELEPHONE (OUTPATIENT)
Dept: INFECTIOUS DISEASES | Age: 32
End: 2022-07-12

## 2022-07-12 NOTE — TELEPHONE ENCOUNTER
Call placed to pt to discuss up coming support group. Date 7.28.22    Discussed upcoming HIV support group. Is interested in coming. Pt is in need of help with transportation  Rn and pt reviewed additional transport options and assistance. It is determined he will use [facility] as last report for help. [facility] notified. Pt aware transport will be there for  about 30 mins prior to apt.     7.28.22 1-230 Support group    Denies questions and will call with concerns. Denies any needs at this time. Denies any issues that need Doctor attention.  Will call with concerns

## 2022-07-19 ENCOUNTER — TELEPHONE (OUTPATIENT)
Dept: INFECTIOUS DISEASES | Age: 32
End: 2022-07-19

## 2022-07-19 NOTE — TELEPHONE ENCOUNTER
Pt called, needed to make changes to scheduled transport for appointments. CM scheduled transport for 7/20/22  at 9AM for lab work and 7/21/22  at 10:30AM for appointment with [facility]. CM assessed need, arrangements as last resort.

## 2022-07-20 ENCOUNTER — TELEPHONE (OUTPATIENT)
Dept: INFECTIOUS DISEASES | Age: 32
End: 2022-07-20

## 2022-07-20 DIAGNOSIS — Z21 ASYMPTOMATIC HIV INFECTION (HCC): ICD-10-CM

## 2022-07-20 DIAGNOSIS — Z86.19 H/O SYPHILIS: ICD-10-CM

## 2022-07-20 LAB
ALBUMIN SERPL-MCNC: 4.4 G/DL (ref 3.5–4.6)
ALP BLD-CCNC: 81 U/L (ref 35–104)
ALT SERPL-CCNC: 22 U/L (ref 0–41)
ANION GAP SERPL CALCULATED.3IONS-SCNC: 11 MEQ/L (ref 9–15)
AST SERPL-CCNC: 21 U/L (ref 0–40)
BILIRUB SERPL-MCNC: 0.3 MG/DL (ref 0.2–0.7)
BUN BLDV-MCNC: 10 MG/DL (ref 6–20)
CALCIUM SERPL-MCNC: 9.7 MG/DL (ref 8.5–9.9)
CHLORIDE BLD-SCNC: 106 MEQ/L (ref 95–107)
CHOLESTEROL, TOTAL: 232 MG/DL (ref 0–199)
CO2: 27 MEQ/L (ref 20–31)
CREAT SERPL-MCNC: 0.95 MG/DL (ref 0.7–1.2)
GFR AFRICAN AMERICAN: >60
GFR NON-AFRICAN AMERICAN: >60
GLOBULIN: 3.3 G/DL (ref 2.3–3.5)
GLUCOSE BLD-MCNC: 105 MG/DL (ref 70–99)
HCT VFR BLD CALC: 41.2 % (ref 42–52)
HDLC SERPL-MCNC: 43 MG/DL (ref 40–59)
HEMOGLOBIN: 13.9 G/DL (ref 14–18)
LDL CHOLESTEROL CALCULATED: 155 MG/DL (ref 0–129)
MCH RBC QN AUTO: 29.7 PG (ref 27–31.3)
MCHC RBC AUTO-ENTMCNC: 33.7 % (ref 33–37)
MCV RBC AUTO: 88.1 FL (ref 80–100)
PDW BLD-RTO: 14.3 % (ref 11.5–14.5)
PLATELET # BLD: 186 K/UL (ref 130–400)
POTASSIUM SERPL-SCNC: 3.6 MEQ/L (ref 3.4–4.9)
RBC # BLD: 4.67 M/UL (ref 4.7–6.1)
SODIUM BLD-SCNC: 144 MEQ/L (ref 135–144)
TOTAL PROTEIN: 7.7 G/DL (ref 6.3–8)
TRIGL SERPL-MCNC: 169 MG/DL (ref 0–150)
VITAMIN D 25-HYDROXY: 19.4 NG/ML
WBC # BLD: 3.3 K/UL (ref 4.8–10.8)

## 2022-07-20 NOTE — TELEPHONE ENCOUNTER
Pt called into clinic requesting help with basic needs. Requesting food/gas voucher  Needs assessed. 1 food voucher given to pt via [facility]      Denies any needs at this time. Denies any issues that need Doctor attention.  Will call with concerns

## 2022-07-21 ENCOUNTER — TELEPHONE (OUTPATIENT)
Dept: INFECTIOUS DISEASES | Age: 32
End: 2022-07-21

## 2022-07-21 LAB
ABSOLUTE CD 4 HELPER: 544 CELLS/UL (ref 430–1800)
CD4 % HELPER T CELL: 25 % (ref 32–64)
LYMPHOCYTE SUBSET PANEL 2 INFO: ABNORMAL
RPR TITER: NORMAL
RPR: REACTIVE

## 2022-07-22 LAB
C. TRACHOMATIS DNA ,URINE: NEGATIVE
HIV-1 QNT LOG, IU/ML: NOT DETECTED LOG CPY/ML
HIV-1 QNT, IU/ML: NOT DETECTED CPY/ML
INTERPRETATION: NOT DETECTED
N. GONORRHOEAE DNA, URINE: NEGATIVE

## 2022-08-04 ENCOUNTER — TELEPHONE (OUTPATIENT)
Dept: INFECTIOUS DISEASES | Age: 32
End: 2022-08-04

## 2022-08-04 NOTE — TELEPHONE ENCOUNTER
Pt called in with complaints of neck pain. Unsure what caused injury per pt. Requesting to go to walk in clinic. Rn and pt reviewed additional transport options and assistance. It is determined that pt will need transport assistance as last resort. Aware of policy and  time. [place] notified. All appts are related to medical treatment and are necessary for compliance  This transportation assistance will help patient remain in medical care by enabling them to access medical and support services. Appt: 8.5.22  at 9 am [facility]    Denies any needs at this time. Denies any issues that need Doctor attention.  Will call with concerns

## 2022-08-05 ENCOUNTER — OFFICE VISIT (OUTPATIENT)
Dept: FAMILY MEDICINE CLINIC | Age: 32
End: 2022-08-05
Payer: MEDICAID

## 2022-08-05 VITALS
SYSTOLIC BLOOD PRESSURE: 132 MMHG | TEMPERATURE: 96.7 F | OXYGEN SATURATION: 99 % | HEIGHT: 71 IN | DIASTOLIC BLOOD PRESSURE: 70 MMHG | HEART RATE: 59 BPM | WEIGHT: 315 LBS | BODY MASS INDEX: 44.1 KG/M2

## 2022-08-05 DIAGNOSIS — S16.1XXA NECK STRAIN, INITIAL ENCOUNTER: Primary | ICD-10-CM

## 2022-08-05 PROCEDURE — 99213 OFFICE O/P EST LOW 20 MIN: CPT | Performed by: NURSE PRACTITIONER

## 2022-08-05 RX ORDER — IBUPROFEN 800 MG/1
800 TABLET ORAL 2 TIMES DAILY PRN
Qty: 60 TABLET | Refills: 0 | Status: SHIPPED | OUTPATIENT
Start: 2022-08-05

## 2022-08-05 RX ORDER — METHOCARBAMOL 500 MG/1
500 TABLET, FILM COATED ORAL 3 TIMES DAILY
Qty: 30 TABLET | Refills: 0 | Status: SHIPPED | OUTPATIENT
Start: 2022-08-05 | End: 2022-08-15

## 2022-08-05 RX ORDER — METHYLPREDNISOLONE 4 MG/1
TABLET ORAL
Qty: 21 KIT | Refills: 0 | Status: SHIPPED | OUTPATIENT
Start: 2022-08-05 | End: 2022-08-11

## 2022-08-05 SDOH — ECONOMIC STABILITY: FOOD INSECURITY: WITHIN THE PAST 12 MONTHS, THE FOOD YOU BOUGHT JUST DIDN'T LAST AND YOU DIDN'T HAVE MONEY TO GET MORE.: NEVER TRUE

## 2022-08-05 SDOH — ECONOMIC STABILITY: FOOD INSECURITY: WITHIN THE PAST 12 MONTHS, YOU WORRIED THAT YOUR FOOD WOULD RUN OUT BEFORE YOU GOT MONEY TO BUY MORE.: NEVER TRUE

## 2022-08-05 ASSESSMENT — PATIENT HEALTH QUESTIONNAIRE - PHQ9
SUM OF ALL RESPONSES TO PHQ QUESTIONS 1-9: 0
SUM OF ALL RESPONSES TO PHQ9 QUESTIONS 1 & 2: 0
SUM OF ALL RESPONSES TO PHQ QUESTIONS 1-9: 0
1. LITTLE INTEREST OR PLEASURE IN DOING THINGS: 0
SUM OF ALL RESPONSES TO PHQ QUESTIONS 1-9: 0
2. FEELING DOWN, DEPRESSED OR HOPELESS: 0
SUM OF ALL RESPONSES TO PHQ QUESTIONS 1-9: 0

## 2022-08-05 ASSESSMENT — SOCIAL DETERMINANTS OF HEALTH (SDOH): HOW HARD IS IT FOR YOU TO PAY FOR THE VERY BASICS LIKE FOOD, HOUSING, MEDICAL CARE, AND HEATING?: NOT HARD AT ALL

## 2022-08-05 NOTE — PROGRESS NOTES
Subjective:      Patient ID: Slick Lawton is a 27 y.o. male who presents today for:  Chief Complaint   Patient presents with    Neck Pain     Neck pain x1 week ago       HPI SUBJECTIVE:   Slick Lawton is a 27 y.o. male who complains of an neck pain for 10 days. The pain is positional with movement of neck with radiation of pain into shoulders. Mechanism of injury: unknown. Symptoms have been constant since that time. Prior history of neck problems: no prior neck problems. There is no numbness, tingling, weakness in the arms. OBJECTIVE:  Vital signs as noted above. Patient appears to be in mild to moderate pain. Neck exam: tenderness over trapezial muscles, normal neurological exam of arms; normal DTR's, motor, sensory exam.  X-Ray: not indicated. ASSESSMENT:   strain and cervical strain    PLAN:  rest the injured area as much as practical, apply ice packs, apply heat (warned not to sleep on heating pad), prescription for muscle relaxant, prescription for NSAID given  Consider Physical Therapy and XRay studies if not improving. Call or return to clinic prn if these symptoms worsen or fail to improve as anticipated. Past Medical History:   Diagnosis Date    HIV (human immunodeficiency virus infection) ([facility] [address]     Immune deficiency disorder ([facility] [address]     HIV not in treatment currently    Late latent syphilis      History reviewed. No pertinent surgical history.   Social History     Socioeconomic History    Marital status: Single     Spouse name: Not on file    Number of children: Not on file    Years of education: Not on file    Highest education level: Not on file   Occupational History    Not on file   Tobacco Use    Smoking status: Never    Smokeless tobacco: Never   Vaping Use    Vaping Use: Never used   Substance and Sexual Activity    Alcohol use: No    Drug use: Yes     Types: Marijuana (Weed)     Comment: smoked 2 weeks ago    Sexual activity: Not on file   Other Topics Concern    Not on file   Social History Narrative    Not on file     Social Determinants of Health     Financial Resource Strain: Low Risk     Difficulty of Paying Living Expenses: Not hard at all   Food Insecurity: No Food Insecurity    Worried About Running Out of Food in the Last Year: Never true    Ran Out of Food in the Last Year: Never true   Transportation Needs: Not on file   Physical Activity: Not on file   Stress: Not on file   Social Connections: Not on file   Intimate Partner Violence: Not on file   Housing Stability: Not on file     History reviewed. No pertinent family history. No Known Allergies  Current Outpatient Medications   Medication Sig Dispense Refill    methylPREDNISolone (MEDROL DOSEPACK) 4 MG tablet Take by mouth. 21 kit 0    methocarbamol (ROBAXIN) 500 MG tablet Take 1 tablet by mouth in the morning and 1 tablet at noon and 1 tablet before bedtime. Do all this for 10 days. 30 tablet 0    ibuprofen (ADVIL;MOTRIN) 800 MG tablet Take 1 tablet by mouth 2 times daily as needed for Pain 60 tablet 0    TRIUMEQ 600- MG TABS TAKE 1 TABLET BY MOUTH DAILY 30 tablet 3    sertraline (ZOLOFT) 50 MG tablet TK 1 T PO QD  1    ziprasidone (GEODON) 20 MG capsule TK 1 C PO BID WITH ZACK AND DINNER  1     No current facility-administered medications for this visit. Review of Systems   Musculoskeletal:  Positive for neck pain. All other systems reviewed and are negative. Objective:   /70   Pulse 59   Temp (!) 96.7 °F (35.9 °C) (Infrared)   Ht 5' 11\" (1.803 m)   Wt (!) 323 lb 12.8 oz (146.9 kg)   SpO2 99%   BMI 45.16 kg/m²     Physical Exam  Constitutional:       General: He is not in acute distress. Appearance: He is well-developed. HENT:      Head: Normocephalic and atraumatic. Nose: Nose normal.   Eyes:      Pupils: Pupils are equal, round, and reactive to light. Cardiovascular:      Rate and Rhythm: Normal rate and regular rhythm. Heart sounds: Normal heart sounds.    Pulmonary: Effort: Pulmonary effort is normal.      Breath sounds: Normal breath sounds. Abdominal:      General: Bowel sounds are normal.      Palpations: Abdomen is soft. Musculoskeletal:         General: Normal range of motion. Cervical back: Normal range of motion and neck supple. Tenderness present. Skin:     General: Skin is warm and dry. Capillary Refill: Capillary refill takes less than 2 seconds. Neurological:      General: No focal deficit present. Mental Status: He is alert and oriented to person, place, and time. Psychiatric:         Behavior: Behavior normal.       Assessment:       Diagnosis Orders   1. Neck strain, initial encounter  methylPREDNISolone (MEDROL DOSEPACK) 4 MG tablet    methocarbamol (ROBAXIN) 500 MG tablet        No results found for this visit on 08/05/22. Plan:     Assessment & Plan   Marek Reyes was seen today for neck pain. Diagnoses and all orders for this visit:    Neck strain, initial encounter  -     methylPREDNISolone (MEDROL DOSEPACK) 4 MG tablet; Take by mouth. -     methocarbamol (ROBAXIN) 500 MG tablet; Take 1 tablet by mouth in the morning and 1 tablet at noon and 1 tablet before bedtime. Do all this for 10 days. Other orders  -     ibuprofen (ADVIL;MOTRIN) 800 MG tablet; Take 1 tablet by mouth 2 times daily as needed for Pain    No orders of the defined types were placed in this encounter. Orders Placed This Encounter   Medications    methylPREDNISolone (MEDROL DOSEPACK) 4 MG tablet     Sig: Take by mouth. Dispense:  21 kit     Refill:  0    methocarbamol (ROBAXIN) 500 MG tablet     Sig: Take 1 tablet by mouth in the morning and 1 tablet at noon and 1 tablet before bedtime. Do all this for 10 days.      Dispense:  30 tablet     Refill:  0    ibuprofen (ADVIL;MOTRIN) 800 MG tablet     Sig: Take 1 tablet by mouth 2 times daily as needed for Pain     Dispense:  60 tablet     Refill:  0     Medications Discontinued During This Encounter   Medication Reason    ibuprofen (ADVIL;MOTRIN) 800 MG tablet LIST CLEANUP     No follow-ups on file. Reviewed with the patient/family: current clinical status & medications. Side effects of the medication prescribed today, as well as treatment plan/rationale and result expectations have been discussed with the patient/family who expresses understanding. Patient will be discharged home in stable condition. Follow up with PCP to evaluate treatment results or return if symptoms worsen or fail to improve. Discussed signs and symptoms which require immediate follow-up in ED/call to 911. Understanding verbalized. I have reviewed the patient's medical history in detail and updated the computerized patient record.     Yanique White, APRN - CNP

## 2022-08-08 ENCOUNTER — TELEPHONE (OUTPATIENT)
Dept: INFECTIOUS DISEASES | Age: 32
End: 2022-08-08

## 2022-08-08 NOTE — TELEPHONE ENCOUNTER
Per request, CM scheduled transport to appointment with dentist 8/10/22 @ 9:40AM.  CM assessed need, arrangements as last resort.

## 2022-08-10 ENCOUNTER — TELEPHONE (OUTPATIENT)
Dept: INFECTIOUS DISEASES | Age: 32
End: 2022-08-10

## 2022-08-22 ENCOUNTER — TELEPHONE (OUTPATIENT)
Dept: INFECTIOUS DISEASES | Age: 32
End: 2022-08-22

## 2022-08-22 NOTE — TELEPHONE ENCOUNTER
Pt called into office in need of help with transport for up coming apt. .     Rn and pt reviewed additional transport options and assistance. It is determined that pt will need transport assistance as last resort. Aware of policy and  time. [facility] notified. All appts are related to medical treatment and are necessary for compliance    This transportation assistance will help patient remain in medical care by enabling them to access medical and support services. Appt: 8.23.22 at 65 Dr Anna Fitzgerald    Denies any needs at this time. Denies any issues that need Doctor attention.  Will call with concerns

## 2022-08-23 ENCOUNTER — OFFICE VISIT (OUTPATIENT)
Dept: INFECTIOUS DISEASES | Age: 32
End: 2022-08-23
Payer: MEDICAID

## 2022-08-23 ENCOUNTER — NURSE ONLY (OUTPATIENT)
Dept: INFECTIOUS DISEASES | Age: 32
End: 2022-08-23

## 2022-08-23 VITALS — DIASTOLIC BLOOD PRESSURE: 78 MMHG | SYSTOLIC BLOOD PRESSURE: 122 MMHG | TEMPERATURE: 98.1 F | HEART RATE: 65 BPM

## 2022-08-23 DIAGNOSIS — Z21 ASYMPTOMATIC HIV INFECTION (HCC): Primary | ICD-10-CM

## 2022-08-23 DIAGNOSIS — Z86.19 H/O SYPHILIS: ICD-10-CM

## 2022-08-23 PROCEDURE — G8417 CALC BMI ABV UP PARAM F/U: HCPCS | Performed by: INTERNAL MEDICINE

## 2022-08-23 PROCEDURE — G8427 DOCREV CUR MEDS BY ELIG CLIN: HCPCS | Performed by: INTERNAL MEDICINE

## 2022-08-23 PROCEDURE — 1036F TOBACCO NON-USER: CPT | Performed by: INTERNAL MEDICINE

## 2022-08-23 PROCEDURE — 99213 OFFICE O/P EST LOW 20 MIN: CPT | Performed by: INTERNAL MEDICINE

## 2022-08-23 ASSESSMENT — ENCOUNTER SYMPTOMS
RESPIRATORY NEGATIVE: 1
GASTROINTESTINAL NEGATIVE: 1

## 2022-08-23 NOTE — PROGRESS NOTES
Routine b20 apt. Neck is feeling better. Recent strain. Muscle relaxer helped. Given from walk in clinic. Left side still bothersome. No issues with meds. No other complaints. Will attend group this Thursday.

## 2022-08-23 NOTE — PROGRESS NOTES
Pt here for 6 month b20 f/u. States 100% compliance with medication and denies any issues. VL undetectable CD4 544.

## 2022-08-23 NOTE — PROGRESS NOTES
Pt presented for scheduled appointment with Dr. Antoni Sims. Cm met with pt provided 1x1. Pt complained of neck and back pain, went to [facility] 8/5/20. Pt was prescribed medication for pain, still no relief. CM reviewed notes with pt. X-ray and PT recommended if pain persists. CM encouraged pt to discuss with Dr. Antoni Sims. Pt discussed legal issues, will need to do some community hours. Pt discussed toll it has been on MH. CM provided active listening and support. Pt continues counseling at [facility]. CM provided food voucher, need assessed. CM will follow up with pt as needed.

## 2022-08-23 NOTE — PROGRESS NOTES
Infectious Disease     Patient Name: Chandni Rivera  Date: 8/23/2022  YOB: 1990  Medical Record Number: [id number]        HIV infection without an AIDS defining illness  Late latent syphilis      HIV   ONTriumeq (Dolutegravir-abacavir-lamivudine)  X 5-6 YEARS  HX OF non compliance    Late latent syphilis diagnosed by positive RPR            Component Ref Range & Units 7/20/22 0921 1/28/22 1044 8/3/21 1034 1/15/21 1110 6/30/20 1121 1/8/20 1534 9/12/19 0955   Lymphocyte Subset Panel 2 Info  See Note  See Note CM  See Note CM  See Note CM  See Note CM  See Note CM  See Note CM       CD4 % [place] T Cell 32 - 64 % 25 Low   23 Low   22 Low   28 Low   26 Low   24 Low   23 Low     Absolute CD 4 [place] 430 - 1800 cells/uL 544  714  549  537  642  471  472        Component Ref Range & Units 7/20/22 0921 1/28/22 1044 8/3/21 1033 1/15/21 1110 6/30/20 1121 1/8/20 1534 9/4/19 1157   HIV-1 QNT, IU/ML cpy/mL Not Detected  Not Detected  Not Detected  Not Detected  Not Detected  <30 Detected  <30 Detected    HIV-1 QNT Log, IU/ML log cpy/mL Not Detected  Not Detected  Not Detected  Not Detected  Not Detected  <1.47 CM  <1.47 CM    Interpretation Not Detected Not Detected  Not Detected CM  Not Detected CM  Not Detected CM  Not Detected CM  Detected Abnormal  CM  Detected Abnormal  CM                  Component Ref Range & Units 1/28/22 1044 8/3/21 1034 6/30/20 1121 12/5/19 1534 9/4/19 1157 1/9/19 1437   RPR TITER <1:1 1:32 High   1:128 R  1:8 R  1:8 R  1:16 R  1:16        Component 4/5/22 0953    RPR TITER 1:16          Component 7/20/22 0921    RPR TITER 1:16          Review of Systems   Constitutional: Negative. HENT: Negative. Respiratory: Negative. Cardiovascular: Negative. Gastrointestinal: Negative. Genitourinary: Negative.       Review of Systems: All 14 review of systems negative other than as stated above    Social History     Tobacco Use    Smoking status: Never    Smokeless tobacco: Never   Vaping Use    Vaping Use: Never used   Substance Use Topics    Alcohol use: No    Drug use: Yes     Types: Marijuana (Weed)     Comment: smoked 2 weeks ago         Past Medical History:   Diagnosis Date    HIV (human immunodeficiency virus infection) ([facility] [address]     Immune deficiency disorder ([facility]     HIV not in treatment currently    Late latent syphilis              Current Outpatient Medications on File Prior to Visit   Medication Sig Dispense Refill    ibuprofen (ADVIL;MOTRIN) 800 MG tablet Take 1 tablet by mouth 2 times daily as needed for Pain 60 tablet 0    TRIUMEQ 600- MG TABS TAKE 1 TABLET BY MOUTH DAILY 30 tablet 3    sertraline (ZOLOFT) 50 MG tablet TK 1 T PO QD  1    ziprasidone (GEODON) 20 MG capsule TK 1 C PO BID WITH ZACK AND DINNER  1     No current facility-administered medications on file prior to visit. Physical Exam:      Physical Exam  Constitutional:       Appearance: Normal appearance. He is obese. Cardiovascular:      Heart sounds: No murmur heard. No gallop. Pulmonary:      Effort: No respiratory distress. Breath sounds: Normal breath sounds. No wheezing, rhonchi or rales. Abdominal:      General: Abdomen is flat. There is no distension. Palpations: There is no mass. Tenderness: There is no abdominal tenderness. There is no right CVA tenderness, left CVA tenderness, guarding or rebound. Hernia: No hernia is present. Musculoskeletal:         General: No swelling or tenderness. Skin:     General: Skin is warm and dry. Neurological:      Mental Status: He is alert. There were no vitals taken for this visit.       .   Lab Results   Component Value Date    WBC 3.3 (L) 07/20/2022    HGB 13.9 (L) 07/20/2022    HCT 41.2 (L) 07/20/2022    MCV 88.1 07/20/2022     07/20/2022     Lab Results   Component Value Date/Time     07/20/2022 09:21 AM    K 3.6 07/20/2022 09:21 AM     07/20/2022 09:21 AM    CO2 27 07/20/2022 09:21 AM    BUN 10 07/20/2022 09:21 AM    CREATININE 0.95 07/20/2022 09:21 AM    GLUCOSE 105 07/20/2022 09:21 AM    CALCIUM 9.7 07/20/2022 09:21 AM        Component Collected Lab   EER HIV Genotyping 01/09/2019  2:37 PM [facility]   See Note    Comment:   Access [facility] Enhanced Report using either link below:     -Direct access: [email or web address]     -Enter Username, Password: [email or web address]    Username: [email or web address]    Password: [email or web address]   Performed by [address] [address] [facility] [phone]   www. Landon Rodas MD - Lab.  Director    HIV-1 Genotype 01/09/2019  2:37 PM [facility]   See Note    Comment:     Drug Resistance:   NRTI Drug Class       VIDEX, (didanosine, ddI)                             None     VIREAD, (tenofovir, TDF)                             None     ZERIT, (stavudine, d4T)                              None     ZIAGEN, (abacavir, ABC)                              None     EMTRIVA, (emtricitabine, FTC)                        None     RETROVIR, (zidovudine, ZDV)                          None     EPIVIR, (lamivudine, 3TC)                            None       NRTI drug resistance mutations identified: None     NNRTI Drug Class       SUSTIVA, (efavirenz, EFV)                            None     VIRAMUNE, (nevirapine, NVP)                          None     INTELENCE, (etravirine, ETR)                         None     EDURANT, (rilpivirine, RPV)                          None       NNRTI drug resistance mutations identified: V108I     PI+ Drug Class       VIRACEPT, (nelfinavir, NFV)                          None     APTIVUS, (tipranavir, TPV)                           None     CRIXIVAN, (indinavir, IDV)                           None     KALETRA, (lopinavir + ritonavir, LPV)                None     REYATAZ, (atazanavir, ATV)                           None     PREZISTA, (darunavir, DRV)                           None     LEXIVA, (fosamprenavir, FPV)                         None Kiki Swann / Stephanie Contreras, (saquinavir, SQV)              None       PI+ drug resistance mutations identified: None               ASSESSMENT:  Patient Active Problem List   Diagnosis    Asymptomatic HIV infection ([facility] [address]    H/O syphilis    Abscess of lower back         PLAN:    HIV infection stable continue with  Triumeq (Dolutegravir-abacavir-lamivudine)    Late latent syphilis  recheck RPR has gone down to 1/16 from a high of 1/128

## 2022-08-26 ENCOUNTER — TELEPHONE (OUTPATIENT)
Dept: INFECTIOUS DISEASES | Age: 32
End: 2022-08-26

## 2022-08-26 NOTE — TELEPHONE ENCOUNTER
Pt presented for Support Group Meeting. Pt was an active participant. Updated information discussed about Monkey Pox. Guest Speaker, Jonnathan Blackburn, provided information on pre-planning for /Burial Arrangements. Per request, CM scheduled transport to appointment with Dentist, 22 @ 9:30AM and to next Support Group Meeting 22 @ 1PM .  CM assessed need, arrangements as last resort.

## 2022-08-29 ENCOUNTER — TELEPHONE (OUTPATIENT)
Dept: INFECTIOUS DISEASES | Age: 32
End: 2022-08-29

## 2022-08-29 NOTE — TELEPHONE ENCOUNTER
Per request, CM scheduled transport for dental appointment 9/20/22 @ 11:50AM.  CM assessed need, arrangements as last resort.

## 2022-09-07 ENCOUNTER — TELEPHONE (OUTPATIENT)
Dept: INFECTIOUS DISEASES | Age: 32
End: 2022-09-07

## 2022-09-07 NOTE — TELEPHONE ENCOUNTER
Pt called in needing help with transport for upcoming appt. Rn and pt reviewed additional transport options and assistance. It is determined that pt will need transport assistance as last resort. Aware of policy and  time. [place] notified. All appts are related to medical treatment and are necessary for compliance    This transportation assistance will help patient remain in medical care by enabling them to access medical and support services. Appt: 9.8.22  at 930 --[facility] 18 -- legal    Denies any needs at this time. Denies any issues that need Doctor attention.  Will call with concerns

## 2022-09-28 RX ORDER — ABACAVIR SULFATE, DOLUTEGRAVIR SODIUM, LAMIVUDINE 600; 50; 300 MG/1; MG/1; MG/1
TABLET, FILM COATED ORAL
Qty: 30 TABLET | Refills: 3 | Status: SHIPPED | OUTPATIENT
Start: 2022-09-28

## 2022-10-02 ENCOUNTER — TELEPHONE (OUTPATIENT)
Dept: INFECTIOUS DISEASES | Age: 32
End: 2022-10-02

## 2022-10-02 ENCOUNTER — APPOINTMENT (OUTPATIENT)
Dept: CT IMAGING | Age: 32
End: 2022-10-02
Payer: MEDICAID

## 2022-10-02 ENCOUNTER — HOSPITAL ENCOUNTER (EMERGENCY)
Age: 32
Discharge: HOME | End: 2022-10-02
Attending: FAMILY MEDICINE
Payer: MEDICAID

## 2022-10-02 VITALS
SYSTOLIC BLOOD PRESSURE: 128 MMHG | TEMPERATURE: 98 F | BODY MASS INDEX: 43.4 KG/M2 | RESPIRATION RATE: 16 BRPM | HEIGHT: 71 IN | WEIGHT: 310 LBS | HEART RATE: 62 BPM | OXYGEN SATURATION: 99 % | DIASTOLIC BLOOD PRESSURE: 73 MMHG

## 2022-10-02 DIAGNOSIS — A08.4 VIRAL GASTROENTERITIS: Primary | ICD-10-CM

## 2022-10-02 DIAGNOSIS — E87.6 HYPOKALEMIA: ICD-10-CM

## 2022-10-02 LAB
ALBUMIN SERPL-MCNC: 4.6 G/DL (ref 3.5–4.6)
ALP BLD-CCNC: 82 U/L (ref 35–104)
ALT SERPL-CCNC: 31 U/L (ref 0–41)
ANION GAP SERPL CALCULATED.3IONS-SCNC: 14 MEQ/L (ref 9–15)
AST SERPL-CCNC: 32 U/L (ref 0–40)
BASOPHILS ABSOLUTE: 0 K/UL (ref 0–0.2)
BASOPHILS RELATIVE PERCENT: 0.2 %
BILIRUB SERPL-MCNC: <0.2 MG/DL (ref 0.2–0.7)
BILIRUBIN URINE: NEGATIVE
BLOOD, URINE: NEGATIVE
BUN BLDV-MCNC: 8 MG/DL (ref 6–20)
CALCIUM SERPL-MCNC: 8.9 MG/DL (ref 8.5–9.9)
CHLORIDE BLD-SCNC: 101 MEQ/L (ref 95–107)
CLARITY: CLEAR
CO2: 24 MEQ/L (ref 20–31)
COLOR: YELLOW
CREAT SERPL-MCNC: 0.88 MG/DL (ref 0.7–1.2)
EOSINOPHILS ABSOLUTE: 0 K/UL (ref 0–0.7)
EOSINOPHILS RELATIVE PERCENT: 0.3 %
GFR AFRICAN AMERICAN: >60
GFR NON-AFRICAN AMERICAN: >60
GLOBULIN: 3.5 G/DL (ref 2.3–3.5)
GLUCOSE BLD-MCNC: 135 MG/DL (ref 70–99)
GLUCOSE URINE: NEGATIVE MG/DL
HCT VFR BLD CALC: 45 % (ref 42–52)
HEMOGLOBIN: 15 G/DL (ref 14–18)
KETONES, URINE: NEGATIVE MG/DL
LEUKOCYTE ESTERASE, URINE: NEGATIVE
LYMPHOCYTES ABSOLUTE: 2.4 K/UL (ref 1–4.8)
LYMPHOCYTES RELATIVE PERCENT: 26.2 %
MCH RBC QN AUTO: 29.4 PG (ref 27–31.3)
MCHC RBC AUTO-ENTMCNC: 33.4 % (ref 33–37)
MCV RBC AUTO: 87.9 FL (ref 80–100)
MONOCYTES ABSOLUTE: 0.5 K/UL (ref 0.2–0.8)
MONOCYTES RELATIVE PERCENT: 5.9 %
NEUTROPHILS ABSOLUTE: 6.2 K/UL (ref 1.4–6.5)
NEUTROPHILS RELATIVE PERCENT: 67.4 %
NITRITE, URINE: NEGATIVE
PDW BLD-RTO: 14 % (ref 11.5–14.5)
PH UA: 7.5 (ref 5–9)
PLATELET # BLD: 175 K/UL (ref 130–400)
POTASSIUM SERPL-SCNC: 2.9 MEQ/L (ref 3.4–4.9)
PROTEIN UA: NEGATIVE MG/DL
RBC # BLD: 5.12 M/UL (ref 4.7–6.1)
SODIUM BLD-SCNC: 139 MEQ/L (ref 135–144)
SPECIFIC GRAVITY UA: 1.01 (ref 1–1.03)
TOTAL PROTEIN: 8.1 G/DL (ref 6.3–8)
URINE REFLEX TO CULTURE: NORMAL
UROBILINOGEN, URINE: 0.2 E.U./DL
WBC # BLD: 9.2 K/UL (ref 4.8–10.8)

## 2022-10-02 PROCEDURE — 85025 COMPLETE CBC W/AUTO DIFF WBC: CPT

## 2022-10-02 PROCEDURE — 6360000002 HC RX W HCPCS: Performed by: FAMILY MEDICINE

## 2022-10-02 PROCEDURE — 99284 EMERGENCY DEPT VISIT MOD MDM: CPT

## 2022-10-02 PROCEDURE — 36415 COLL VENOUS BLD VENIPUNCTURE: CPT

## 2022-10-02 PROCEDURE — 81003 URINALYSIS AUTO W/O SCOPE: CPT

## 2022-10-02 PROCEDURE — 96375 TX/PRO/DX INJ NEW DRUG ADDON: CPT

## 2022-10-02 PROCEDURE — 96372 THER/PROPH/DIAG INJ SC/IM: CPT

## 2022-10-02 PROCEDURE — 80053 COMPREHEN METABOLIC PANEL: CPT

## 2022-10-02 PROCEDURE — 2580000003 HC RX 258: Performed by: FAMILY MEDICINE

## 2022-10-02 PROCEDURE — 74176 CT ABD & PELVIS W/O CONTRAST: CPT

## 2022-10-02 PROCEDURE — 96365 THER/PROPH/DIAG IV INF INIT: CPT

## 2022-10-02 RX ORDER — POTASSIUM CHLORIDE 7.45 MG/ML
10 INJECTION INTRAVENOUS ONCE
Status: COMPLETED | OUTPATIENT
Start: 2022-10-02 | End: 2022-10-02

## 2022-10-02 RX ORDER — 0.9 % SODIUM CHLORIDE 0.9 %
1000 INTRAVENOUS SOLUTION INTRAVENOUS ONCE
Status: COMPLETED | OUTPATIENT
Start: 2022-10-02 | End: 2022-10-02

## 2022-10-02 RX ORDER — DICYCLOMINE HYDROCHLORIDE 10 MG/ML
20 INJECTION INTRAMUSCULAR ONCE
Status: COMPLETED | OUTPATIENT
Start: 2022-10-02 | End: 2022-10-02

## 2022-10-02 RX ORDER — ONDANSETRON 2 MG/ML
4 INJECTION INTRAMUSCULAR; INTRAVENOUS ONCE
Status: COMPLETED | OUTPATIENT
Start: 2022-10-02 | End: 2022-10-02

## 2022-10-02 RX ORDER — DICYCLOMINE HYDROCHLORIDE 10 MG/1
10 CAPSULE ORAL
Qty: 120 CAPSULE | Refills: 3 | Status: SHIPPED | OUTPATIENT
Start: 2022-10-02

## 2022-10-02 RX ORDER — ONDANSETRON 4 MG/1
4 TABLET, ORALLY DISINTEGRATING ORAL EVERY 8 HOURS PRN
Qty: 20 TABLET | Refills: 0 | Status: SHIPPED | OUTPATIENT
Start: 2022-10-02

## 2022-10-02 RX ADMIN — SODIUM CHLORIDE 1000 ML: 9 INJECTION, SOLUTION INTRAVENOUS at 10:24

## 2022-10-02 RX ADMIN — POTASSIUM CHLORIDE 10 MEQ: 7.46 INJECTION, SOLUTION INTRAVENOUS at 11:19

## 2022-10-02 RX ADMIN — DICYCLOMINE HYDROCHLORIDE 20 MG: 10 INJECTION, SOLUTION INTRAMUSCULAR at 10:24

## 2022-10-02 RX ADMIN — ONDANSETRON 4 MG: 2 INJECTION INTRAMUSCULAR; INTRAVENOUS at 10:25

## 2022-10-02 ASSESSMENT — ENCOUNTER SYMPTOMS
RESPIRATORY NEGATIVE: 1
ALLERGIC/IMMUNOLOGIC NEGATIVE: 1
EYES NEGATIVE: 1
DIARRHEA: 1
ABDOMINAL PAIN: 1
NAUSEA: 1
VOMITING: 1

## 2022-10-02 ASSESSMENT — PAIN DESCRIPTION - FREQUENCY: FREQUENCY: INTERMITTENT

## 2022-10-02 ASSESSMENT — PAIN - FUNCTIONAL ASSESSMENT: PAIN_FUNCTIONAL_ASSESSMENT: 0-10

## 2022-10-02 ASSESSMENT — PAIN DESCRIPTION - LOCATION: LOCATION: ABDOMEN

## 2022-10-02 ASSESSMENT — PAIN DESCRIPTION - DESCRIPTORS: DESCRIPTORS: CRAMPING

## 2022-10-02 ASSESSMENT — PAIN SCALES - GENERAL
PAINLEVEL_OUTOF10: 10
PAINLEVEL_OUTOF10: 0

## 2022-10-02 ASSESSMENT — PAIN DESCRIPTION - PAIN TYPE: TYPE: ACUTE PAIN

## 2022-10-02 NOTE — ED PROVIDER NOTES
[facility] ED  eMERGENCY dEPARTMENT eNCOUnter      Pt Name: Sarai Randolph  MRN: [id number]  [place] 1990  Date of evaluation: 10/2/2022  Provider: Toñito Olguin MD    [address] [place] [address]       Chief Complaint   Patient presents with    Abdominal Pain     Mid-ABD cramping that began this morning         HISTORY OF PRESENT ILLNESS   (Location/Symptom, Timing/Onset,Context/Setting, Quality, Duration, Modifying Factors, Severity)  Note limiting factors. Sarai Randolph is a 34 y.o. male who presents to the emergency department ab pain      The history is provided by the patient. Abdominal Pain  Pain location:  Generalized  Pain quality: cramping    Pain radiates to:  Does not radiate  Pain severity:  Moderate  Onset quality:  Gradual  Duration:  1 day  Timing:  Intermittent  Progression:  Waxing and waning  Chronicity:  New  Context: eating and suspicious food intake    Context: not alcohol use, not awakening from sleep, not diet changes, not laxative use, not medication withdrawal, not previous surgeries, not recent illness, not recent sexual activity, not recent travel, not retching, not sick contacts and not trauma    Relieved by:  Nothing  Worsened by:  Belching  Ineffective treatments:  None tried  Associated symptoms: diarrhea, nausea and vomiting    Risk factors: obesity    Risk factors: no alcohol abuse, no aspirin use, not elderly, has not had multiple surgeries, no NSAID use and no recent hospitalization      NursingNotes were reviewed. REVIEW OF SYSTEMS    (2-9 systems for level 4, 10 or more for level 5)     Review of Systems   Constitutional: Negative. HENT: Negative. Eyes: Negative. Respiratory: Negative. Cardiovascular: Negative. Gastrointestinal:  Positive for abdominal pain, diarrhea, nausea and vomiting. Endocrine: Negative. Skin: Negative. Allergic/Immunologic: Negative. Negative for immunocompromised state. Neurological: Negative.     Psychiatric/Behavioral: Negative. Except as noted above the remainder of the review of systems was reviewed and negative. PAST MEDICAL HISTORY     Past Medical History:   Diagnosis Date    HIV (human immunodeficiency virus infection) ([facility]     Immune deficiency disorder ([facility]     HIV not in treatment currently    Late latent syphilis          SURGICALHISTORY     History reviewed. No pertinent surgical history. CURRENT MEDICATIONS       Previous Medications    IBUPROFEN (ADVIL;MOTRIN) 800 MG TABLET    Take 1 tablet by mouth 2 times daily as needed for Pain    SERTRALINE (ZOLOFT) 50 MG TABLET    TK 1 T PO QD    TRIUMEQ 600- MG TABS    TAKE 1 TABLET BY MOUTH DAILY    ZIPRASIDONE (GEODON) 20 MG CAPSULE    TK 1 C PO BID WITH ZACK AND DINNER       ALLERGIES     Patient has no known allergies. FAMILY HISTORY     History reviewed. No pertinent family history.        SOCIAL HISTORY       Social History     Socioeconomic History    Marital status: Single     Spouse name: None    Number of children: None    Years of education: None    Highest education level: None   Tobacco Use    Smoking status: Never    Smokeless tobacco: Never   Vaping Use    Vaping Use: Never used   Substance and Sexual Activity    Alcohol use: No    Drug use: Yes     Types: Marijuana (Weed)     Comment: smoked 2 weeks ago     Social Determinants of Health     Financial Resource Strain: Low Risk     Difficulty of Paying Living Expenses: Not hard at all   Food Insecurity: No Food Insecurity    Worried About [address] in the Last Year: Never true    [address] [employer] in the Last Year: Never true       SCREENINGS    [place] Coma Scale  Eye Opening: Spontaneous  Best Verbal Response: Oriented  Best Motor Response: Obeys commands  [place] Coma Scale Score: 15 @FLOW([id number])@      PHYSICAL EXAM    (up to 7 for level 4, 8 or more for level 5)     ED Triage Vitals [10/02/22 1011]   BP Temp Temp Source Heart Rate Resp SpO2 Height Weight   125/63 98 °F (36.7 °C) Oral 73 18 99 % 5' 11\" (1.803 m) (!) 310 lb (140.6 kg)       Physical Exam  Vitals and nursing note reviewed. Constitutional:       Appearance: He is well-developed. HENT:      Head: Normocephalic and atraumatic. Right Ear: External ear normal.      Left Ear: External ear normal.      Nose: Nose normal.   Eyes:      Pupils: Pupils are equal, round, and reactive to light. Cardiovascular:      Rate and Rhythm: Normal rate and regular rhythm. Heart sounds: Normal heart sounds. Pulmonary:      Effort: Pulmonary effort is normal. No respiratory distress. Breath sounds: Normal breath sounds. No wheezing or rales. Chest:      Chest wall: No tenderness. Abdominal:      General: Bowel sounds are normal.      Palpations: Abdomen is soft. Tenderness: There is generalized abdominal tenderness. Musculoskeletal:         General: Normal range of motion. Cervical back: Normal range of motion and neck supple. Skin:     General: Skin is warm and dry. Neurological:      Mental Status: He is alert and oriented to person, place, and time. Cranial Nerves: No cranial nerve deficit. Sensory: No sensory deficit. Motor: No abnormal muscle tone. Coordination: Coordination normal.      Deep Tendon Reflexes: Reflexes normal.   Psychiatric:         Behavior: Behavior normal.         Thought Content:  Thought content normal.         Judgment: Judgment normal.       DIAGNOSTIC RESULTS     EKG: All EKG's are interpreted by the Emergency Department Physician who either signs or Co-signsthis chart in the absence of a cardiologist.      RADIOLOGY:   Non-plain filmimages such as CT, Ultrasound and MRI are read by the radiologist. Plain radiographic images are visualized and preliminarily interpreted by the emergency physician with the below findings:        Interpretation per the Radiologist below, if available at the time ofthis note:    [facility] Additional Contrast? None   Final Result   No acute inflammatory process in the abdomen or pelvis. No evidence of organomegaly. Normal gallbladder and appendix. No evidence of nephrolithiasis or renal obstruction. ED BEDSIDE ULTRASOUND:   Performed by ED Physician - none    LABS:  Labs Reviewed   COMPREHENSIVE METABOLIC PANEL - Abnormal; Notable for the following components:       Result Value    Potassium 2.9 (*)     Glucose 135 (*)     Total Protein 8.1 (*)     All other components within normal limits    Narrative:     Emmett Li  [facility] tel. [phone],  Potassium  results called to and read back by Patience Valentino, 10/02/2022  11:09, by [address]   CBC WITH AUTO DIFFERENTIAL       All other labs were within normal range or not returned as of this dictation. EMERGENCY DEPARTMENT COURSE and DIFFERENTIAL DIAGNOSIS/MDM:   Vitals:    Vitals:    10/02/22 1011 10/02/22 1030 10/02/22 1120   BP: 125/63 135/78 120/61   Pulse: 73  65   Resp: 18  16   Temp: 98 °F (36.7 °C)     TempSrc: Oral     SpO2: 99% 97% 99%   Weight: (!) 310 lb (140.6 kg)     Height: 5' 11\" (1.803 m)                  MDM  Number of Diagnoses or Management Options  Hypokalemia  Viral gastroenteritis  Diagnosis management comments: 34years old presented to the ER with abdominal cramping nausea and diarrhea started this morning. CT of the abdomen pelvis showed no acute inflammatory process blood work significant for hypokalemia patient was given Bentyl Zofran IV potassium better was discharged home with Bentyl and Zofran advised to follow-up with primary care       Amount and/or Complexity of Data Reviewed  Clinical lab tests: ordered and reviewed  Tests in the radiology section of CPT®: ordered and reviewed        CONSULTS:  None    PROCEDURES:  Unless otherwise noted below, none     Procedures    FINAL IMPRESSION      1. Viral gastroenteritis    2.  Hypokalemia          DISPOSITION/PLAN   DISPOSITION Decision To Discharge 10/02/2022 12:18:17 PM      PATIENT REFERRED TO:  Cris Miller MD  [address] [phone]    In 3 days      DISCHARGE MEDICATIONS:  New Prescriptions    DICYCLOMINE (BENTYL) 10 MG CAPSULE    Take 1 capsule by mouth 4 times daily (before meals and nightly)    ONDANSETRON (ZOFRAN ODT) 4 MG DISINTEGRATING TABLET    Take 1 tablet by mouth every 8 hours as needed for Nausea          (Please note thatportions of this note were completed with a voice recognition program.  Efforts were made to edit the dictations but occasionally words are mis-transcribed.)    Kortney Mclaughlin MD (electronically signed)  Attending Emergency Physician          Estela Snyder MD  10/02/22 [phone]

## 2022-10-02 NOTE — ED NOTES
Dr Salazar at bedside with explanation of results and possible discharge. Discharge education reviewed. Patient instructed to follow up with PCP and come back to the ED with any new or worsening symptoms. No questions or concerns at this time.          Rachid Ray RN  10/02/22 [id number]

## 2022-10-02 NOTE — ED NOTES
Patient states he is feeling \"so much better\"  Pt is ambulatory out of the ED.      Jacky Pennington RN  10/02/22 7219

## 2022-10-02 NOTE — ED TRIAGE NOTES
Patient came to the ED for abdominal pain with emesis that began this morning. Pt describes the pain as cramping. Last food intake was \"cold cuts\" last night. Last BM was this morning and was loose.  A&Ox4

## 2022-10-02 NOTE — TELEPHONE ENCOUNTER
With the encouragement of CM, pt was taken to ER due to excruciating abdominal pan. Pt informed CM that he was evaluated and treated for Gastroenteritis and Hypokalemia. Pt to follow up with physician in 3 days. CM will inform Clinic Med Staff.

## 2022-10-04 ENCOUNTER — TELEPHONE (OUTPATIENT)
Dept: INFECTIOUS DISEASES | Age: 32
End: 2022-10-04

## 2022-10-04 NOTE — TELEPHONE ENCOUNTER
Per request, CM scheduled transport to pharmacy to  needed meds today at 9:45AM.  CM assessed need, arrangements as last resort.

## 2022-10-18 ENCOUNTER — TELEPHONE (OUTPATIENT)
Dept: INFECTIOUS DISEASES | Age: 32
End: 2022-10-18

## 2022-10-18 NOTE — TELEPHONE ENCOUNTER
Per request, CM scheduled transport to appointment with dentist 10/19/22 @ 11AM.  CM assessed need, arrangements as last resort.

## 2022-11-02 ENCOUNTER — TELEPHONE (OUTPATIENT)
Dept: INFECTIOUS DISEASES | Age: 32
End: 2022-11-02

## 2022-11-02 NOTE — TELEPHONE ENCOUNTER
Call placed to pt to discuss up coming support group. Date 11.30.22    Discussed upcoming HIV support group. Is interested in coming. Pt is in need of help with transportation  Rn and pt reviewed additional transport options and assistance. It is determined he will use [facility] as last report for help. [facility] notified. Pt aware transport will be there for  about 30 mins prior to apt.     11.30.22 at 1 pm -- support group       Denies questions and will call with concerns. Denies any needs at this time. Denies any issues that need Doctor attention.  Will call with concerns

## 2022-11-02 NOTE — TELEPHONE ENCOUNTER
Per request, CM scheduled transport for appointment with Legal 11.3.22 @ 9AM and Support Group 11/30/22 @ 1PM.  CM assessed need, arrangements as last resort.

## 2022-11-07 ENCOUNTER — TELEPHONE (OUTPATIENT)
Dept: INFECTIOUS DISEASES | Age: 32
End: 2022-11-07

## 2022-11-07 NOTE — TELEPHONE ENCOUNTER
Per request, CM scheduled transport to appointment with [facility] 11/8/22 @ 8AM.  CM assessed need, arrangements as last resort.

## 2022-11-11 ENCOUNTER — TELEPHONE (OUTPATIENT)
Dept: INFECTIOUS DISEASES | Age: 32
End: 2022-11-11

## 2022-11-15 ENCOUNTER — TELEPHONE (OUTPATIENT)
Dept: INFECTIOUS DISEASES | Age: 32
End: 2022-11-15

## 2022-11-15 NOTE — TELEPHONE ENCOUNTER
Per request, CM scheduled transport to appointment with dentist 11/16/22 @ 1PM.  CM assessed need, arrangements as last resort.

## 2022-11-28 ENCOUNTER — TELEPHONE (OUTPATIENT)
Dept: INFECTIOUS DISEASES | Age: 32
End: 2022-11-28

## 2022-11-28 NOTE — TELEPHONE ENCOUNTER
Per request, CM scheduled transport to dental appointment 11.29.22 @ 1PM.  CM assessed need, arrangements as last resort.

## 2022-11-30 ENCOUNTER — TELEPHONE (OUTPATIENT)
Dept: INFECTIOUS DISEASES | Age: 32
End: 2022-11-30

## 2022-11-30 NOTE — TELEPHONE ENCOUNTER
Pt presented for Support Group Meeting. Pt was an active participant. Planning [place] activities discussed by a Member of [place], Open Enrollment for medical insurances to be secured and/or changed also discussed as well as vaccinations for [employer], Covid Boosters and other vaccinations. Topic of discussion was a continuation from last Group on Substance Abuse. Per request, CM scheduled transport to Legal appointment 12/1/22 @ 8:30AM and to next Support Group to be held December 28,2022@ 1PM.  CM assessed need, arrangements as last resort.

## 2022-12-01 ENCOUNTER — TELEPHONE (OUTPATIENT)
Dept: INFECTIOUS DISEASES | Age: 32
End: 2022-12-01

## 2022-12-01 NOTE — TELEPHONE ENCOUNTER
Per request, CM scheduled transport to appointment with Legal 12/7/22 @ 8:30AM.  CM assessed need, arrangements as last resort.

## 2022-12-06 ENCOUNTER — TELEPHONE (OUTPATIENT)
Dept: INFECTIOUS DISEASES | Age: 32
End: 2022-12-06

## 2022-12-08 ENCOUNTER — TELEPHONE (OUTPATIENT)
Dept: INFECTIOUS DISEASES | Age: 32
End: 2022-12-08

## 2022-12-08 NOTE — TELEPHONE ENCOUNTER
Pt called in with complaints regarding GI bug. States his nephew and niece had stomach bug last week. He now has diarrhea since about Monday. States it is liquid. > 3-5 times a day  Encouraged PO hydration. Spoke with Dr Rafi Hemphill  GI DNA pathogen panel can be done to rule out. Spoke with pt. Pt denies at this time wanting to provide stool sample. Discussed orders and why. Pt denies. States he will get Pedialyte and work on eating and hydration. Discussed importance of hydration. Pt states he will call back tomorrow if not any better and do stool sample if needed.

## 2022-12-20 ENCOUNTER — TELEPHONE (OUTPATIENT)
Dept: INFECTIOUS DISEASES | Age: 32
End: 2022-12-20

## 2022-12-21 NOTE — TELEPHONE ENCOUNTER
Per request, CM scheduled transport for dental appointment 12/21/22 @ 1PM.  CM assessed need, arrangements as last resort.

## 2022-12-23 ENCOUNTER — TELEPHONE (OUTPATIENT)
Dept: INFECTIOUS DISEASES | Age: 32
End: 2022-12-23

## 2022-12-23 NOTE — TELEPHONE ENCOUNTER
Call placed to pt regarding need to update RW eligibility and paperwork. Reviewed all needed documentation for update. This includes proof of income ( 1 month total, most recent) proof of residency and/or ID as well as any insurance updates or changes. All [facility] paperwork reviewed and discussed. Pt aware that RW funds are only to be used as last resort. CASSIE and consents reviewed and signed. Transport, Grievance and sliding fee scale reviewed. Pt denied any questions regarding program.     Denies other issues or concerns and will call office if needed      This was discussed via phone per [facility] guidelines.

## 2023-01-13 ENCOUNTER — TELEPHONE (OUTPATIENT)
Dept: INFECTIOUS DISEASES | Age: 33
End: 2023-01-13

## 2023-01-13 DIAGNOSIS — Z21 ASYMPTOMATIC HIV INFECTION (HCC): Primary | ICD-10-CM

## 2023-01-13 NOTE — TELEPHONE ENCOUNTER
Call placed to pt as appt reminder. He also needs lab work completed. Reviewed labs ordered. He will go to [facility] out pt lab     Reviewed appt date and time. Denies issues with appt day. Requested he call if issues arise regarding apt. Also noted pt is in need of 6 month ISP with CM  Apt scheduled 2. 1.23 at 10       Rn and pt reviewed additional transport options and assistance. It is determined that pt will need transport assistance as last resort. Aware of policy and  time. [employer] notified. All appts are related to medical treatment and are necessary for compliance. This transportation assistance will help patient remain in medical care by enabling them to access medical and support services. Appt:   CM apt (ISP) 2.1.23 at 10 in office then patient will go and have lab work completed. Dr Yvonne Pineda 2.7.23 at 21 [phone]    Denies any needs at this time. Denies any issues that need Doctor attention.  Will call with concerns

## 2023-01-18 ENCOUNTER — TELEPHONE (OUTPATIENT)
Dept: INFECTIOUS DISEASES | Age: 33
End: 2023-01-18

## 2023-01-18 NOTE — TELEPHONE ENCOUNTER
Per request, CM scheduled transport to appointment with [facility] 1/20/23 @ 2:30PM.  CM assessed need, arrangements as last resort.

## 2023-01-24 ENCOUNTER — TELEPHONE (OUTPATIENT)
Dept: INFECTIOUS DISEASES | Age: 33
End: 2023-01-24

## 2023-01-24 NOTE — TELEPHONE ENCOUNTER
Per request, CM scheduled transport to Support Group Meeting 1/25/23 @ 1PM.  Cm assessed need, arrangements as last resort.

## 2023-02-01 ENCOUNTER — NURSE ONLY (OUTPATIENT)
Dept: INFECTIOUS DISEASES | Age: 33
End: 2023-02-01

## 2023-02-01 DIAGNOSIS — Z21 ASYMPTOMATIC HIV INFECTION (HCC): ICD-10-CM

## 2023-02-01 LAB
ALBUMIN SERPL-MCNC: 4.4 G/DL (ref 3.5–4.6)
ALP BLD-CCNC: 85 U/L (ref 35–104)
ALT SERPL-CCNC: 42 U/L (ref 0–41)
ANION GAP SERPL CALCULATED.3IONS-SCNC: 10 MEQ/L (ref 9–15)
AST SERPL-CCNC: 36 U/L (ref 0–40)
BILIRUB SERPL-MCNC: 0.4 MG/DL (ref 0.2–0.7)
BUN BLDV-MCNC: 11 MG/DL (ref 6–20)
CALCIUM SERPL-MCNC: 9.1 MG/DL (ref 8.5–9.9)
CHLORIDE BLD-SCNC: 102 MEQ/L (ref 95–107)
CO2: 28 MEQ/L (ref 20–31)
CREAT SERPL-MCNC: 0.85 MG/DL (ref 0.7–1.2)
GFR SERPL CREATININE-BSD FRML MDRD: >60 ML/MIN/{1.73_M2}
GLOBULIN: 3.6 G/DL (ref 2.3–3.5)
GLUCOSE BLD-MCNC: 97 MG/DL (ref 70–99)
HCT VFR BLD CALC: 44.1 % (ref 42–52)
HEMOGLOBIN: 15.3 G/DL (ref 14–18)
MCH RBC QN AUTO: 30.5 PG (ref 27–31.3)
MCHC RBC AUTO-ENTMCNC: 34.6 % (ref 33–37)
MCV RBC AUTO: 88.1 FL (ref 79–92.2)
PDW BLD-RTO: 13.9 % (ref 11.5–14.5)
PLATELET # BLD: 193 K/UL (ref 130–400)
POTASSIUM SERPL-SCNC: 3.5 MEQ/L (ref 3.4–4.9)
RBC # BLD: 5 M/UL (ref 4.7–6.1)
SODIUM BLD-SCNC: 140 MEQ/L (ref 135–144)
TOTAL PROTEIN: 8 G/DL (ref 6.3–8)
WBC # BLD: 4.3 K/UL (ref 4.8–10.8)

## 2023-02-01 NOTE — PROGRESS NOTES
Pt presented for scheduled appointment with CM. CM updated ISP, pt agreed with POC. Pt has hx of SA and MH. Pt receives [facility] at [facility]. In addition to MH/SA, Medical, Transportation, Safe Sex Practices and Knowledge of HIV and Legal Issues are included in POC. CM provided 1x1 for support. Pt discussed aspirations to become a model, probation and other goals. CM provided active listening and support. CM will follow up with pt as needed. Per request, CM scheduled transport for Legal 2/21/23 @ 9AM and Support Group Meeting 2/23/23. CM assessed need, arrangements as last resort.

## 2023-02-03 LAB
ABSOLUTE CD 4 HELPER: 704 CELLS/UL (ref 430–1800)
CD4 % HELPER T CELL: 27 % (ref 32–64)
LYMPHOCYTE SUBSET PANEL 2 INFO: ABNORMAL

## 2023-02-07 ENCOUNTER — OFFICE VISIT (OUTPATIENT)
Dept: INFECTIOUS DISEASES | Age: 33
End: 2023-02-07
Payer: MEDICAID

## 2023-02-07 VITALS
WEIGHT: 315 LBS | BODY MASS INDEX: 44.1 KG/M2 | DIASTOLIC BLOOD PRESSURE: 75 MMHG | HEART RATE: 62 BPM | TEMPERATURE: 97.2 F | HEIGHT: 71 IN | SYSTOLIC BLOOD PRESSURE: 137 MMHG

## 2023-02-07 DIAGNOSIS — Z21 ASYMPTOMATIC HIV INFECTION (HCC): Primary | ICD-10-CM

## 2023-02-07 PROCEDURE — 99213 OFFICE O/P EST LOW 20 MIN: CPT | Performed by: INTERNAL MEDICINE

## 2023-02-07 PROCEDURE — G8417 CALC BMI ABV UP PARAM F/U: HCPCS | Performed by: INTERNAL MEDICINE

## 2023-02-07 PROCEDURE — G8427 DOCREV CUR MEDS BY ELIG CLIN: HCPCS | Performed by: INTERNAL MEDICINE

## 2023-02-07 PROCEDURE — 1036F TOBACCO NON-USER: CPT | Performed by: INTERNAL MEDICINE

## 2023-02-07 PROCEDURE — G8484 FLU IMMUNIZE NO ADMIN: HCPCS | Performed by: INTERNAL MEDICINE

## 2023-02-07 ASSESSMENT — PATIENT HEALTH QUESTIONNAIRE - PHQ9
SUM OF ALL RESPONSES TO PHQ9 QUESTIONS 1 & 2: 0
SUM OF ALL RESPONSES TO PHQ QUESTIONS 1-9: 0
2. FEELING DOWN, DEPRESSED OR HOPELESS: 0
SUM OF ALL RESPONSES TO PHQ QUESTIONS 1-9: 0
1. LITTLE INTEREST OR PLEASURE IN DOING THINGS: 0
SUM OF ALL RESPONSES TO PHQ QUESTIONS 1-9: 0
SUM OF ALL RESPONSES TO PHQ QUESTIONS 1-9: 0

## 2023-02-07 ASSESSMENT — ENCOUNTER SYMPTOMS
RESPIRATORY NEGATIVE: 1
GASTROINTESTINAL NEGATIVE: 1

## 2023-02-07 NOTE — PROGRESS NOTES
Infectious Disease     Patient Name: Mindy Pearson  Date: 2/7/2023  YOB: 1990  Medical Record Number: [id number]        HIV infection without an AIDS defining illness  Late latent syphilis      HIV   ONTriumeq (Dolutegravir-abacavir-lamivudine)  X 5-6 YEARS  HX OF non compliance    Late latent syphilis diagnosed by positive RPR  Component Ref Range & Units 2/1/23 1001 7/20/22 0921 1/28/22 1044 8/3/21 1034 1/15/21 1110 6/30/20 1121 1/8/20 1534   Lymphocyte Subset Panel 2 Info  See Note  See Note CM  See Note CM  See Note CM  See Note CM  See Note CM  See Note CM       CD4 % [place] T Cell 32 - 64 % 27 Low   25 Low   23 Low   22 Low   28 Low   26 Low   24 Low     Absolute CD 4 [place] 430 - 1800 cells/uL 704  544  714  549  537  642  471                Component Ref Range & Units 7/20/22 0921 1/28/22 1044 8/3/21 1033 1/15/21 1110 6/30/20 1121 1/8/20 1534 9/4/19 1157   HIV-1 QNT, IU/ML cpy/mL Not Detected  Not Detected  Not Detected  Not Detected  Not Detected  <30 Detected  <30 Detected    HIV-1 QNT Log, IU/ML log cpy/mL Not Detected  Not Detected  Not Detected  Not Detected  Not Detected  <1.47 CM  <1.47 CM    Interpretation Not Detected Not Detected  Not Detected CM  Not Detected CM  Not Detected CM  Not Detected CM  Detected Abnormal  CM  Detected Abnormal  CM                  Component Ref Range & Units 1/28/22 1044 8/3/21 1034 6/30/20 1121 12/5/19 1534 9/4/19 1157 1/9/19 1437   RPR TITER <1:1 1:32 High   1:128 R  1:8 R  1:8 R  1:16 R  1:16        Component 4/5/22 0953    RPR TITER 1:16          Component 7/20/22 0921    RPR TITER 1:16          Review of Systems   Constitutional: Negative. HENT: Negative. Respiratory: Negative. Cardiovascular: Negative. Gastrointestinal: Negative. Genitourinary: Negative.       Review of Systems: All 14 review of systems negative other than as stated above    Social History     Tobacco Use    Smoking status: Never    Smokeless tobacco: Never   Vaping Use Vaping Use: Never used   Substance Use Topics    Alcohol use: No    Drug use: Yes     Types: Marijuana (Weed)     Comment: smoked 2 weeks ago         Past Medical History:   Diagnosis Date    HIV (human immunodeficiency virus infection) ([facility]     Immune deficiency disorder ([facility]     HIV not in treatment currently    Late latent syphilis              Current Outpatient Medications on File Prior to Visit   Medication Sig Dispense Refill    dicyclomine (BENTYL) 10 MG capsule Take 1 capsule by mouth 4 times daily (before meals and nightly) 120 capsule 3    ondansetron (ZOFRAN ODT) 4 MG disintegrating tablet Take 1 tablet by mouth every 8 hours as needed for Nausea 20 tablet 0    TRIUMEQ 600- MG TABS TAKE 1 TABLET BY MOUTH DAILY 30 tablet 3    ibuprofen (ADVIL;MOTRIN) 800 MG tablet Take 1 tablet by mouth 2 times daily as needed for Pain 60 tablet 0    sertraline (ZOLOFT) 50 MG tablet TK 1 T PO QD  1    ziprasidone (GEODON) 20 MG capsule TK 1 C PO BID WITH ZACK AND DINNER  1     No current facility-administered medications on file prior to visit. Physical Exam:      Physical Exam  Constitutional:       Appearance: Normal appearance. He is obese. Cardiovascular:      Heart sounds: No murmur heard. No gallop. Pulmonary:      Effort: No respiratory distress. Breath sounds: Normal breath sounds. No wheezing, rhonchi or rales. Abdominal:      General: Abdomen is flat. There is no distension. Palpations: There is no mass. Tenderness: There is no abdominal tenderness. There is no right CVA tenderness, left CVA tenderness, guarding or rebound. Hernia: No hernia is present. Musculoskeletal:         General: No swelling or tenderness. Skin:     General: Skin is warm and dry. Neurological:      Mental Status: He is alert. /75   Pulse 62   Temp 97.2 °F (36.2 °C)   Ht 5' 11\" (1.803 m)   Wt (!) 320 lb (145.2 kg)   BMI 44.63 kg/m²         .    Lab Results   Component Value Date    WBC 4.3 (L) 02/01/2023    HGB 15.3 02/01/2023    HCT 44.1 02/01/2023    MCV 88.1 02/01/2023     02/01/2023     Lab Results   Component Value Date/Time     02/01/2023 10:01 AM    K 3.5 02/01/2023 10:01 AM     02/01/2023 10:01 AM    CO2 28 02/01/2023 10:01 AM    BUN 11 02/01/2023 10:01 AM    CREATININE 0.85 02/01/2023 10:01 AM    GLUCOSE 97 02/01/2023 10:01 AM    CALCIUM 9.1 02/01/2023 10:01 AM        Component Collected Lab   EER HIV Genotyping 01/09/2019  2:37 PM [facility]   See Note    Comment:   Access [facility] Enhanced Report using either link below:     -Direct access: [email or web address]     -Enter Username, Password: [email or web address]    Username: [email or web address]    Password: [email or web address]   Performed by [address] [phone]   www. Danelle Cruz MD - Lab.  Director    HIV-1 Genotype 01/09/2019  2:37 PM [facility]   See Note    Comment:     Drug Resistance:   NRTI Drug Class       VIDEX, (didanosine, ddI)                             None     VIREAD, (tenofovir, TDF)                             None     ZERIT, (stavudine, d4T)                              None     ZIAGEN, (abacavir, ABC)                              None     EMTRIVA, (emtricitabine, FTC)                        None     RETROVIR, (zidovudine, ZDV)                          None     EPIVIR, (lamivudine, 3TC)                            None       NRTI drug resistance mutations identified: None     NNRTI Drug Class       SUSTIVA, (efavirenz, EFV)                            None     VIRAMUNE, (nevirapine, NVP)                          None     INTELENCE, (etravirine, ETR)                         None     EDURANT, (rilpivirine, RPV)                          None       NNRTI drug resistance mutations identified: V108I     PI+ Drug Class       VIRACEPT, (nelfinavir, NFV)                          None     APTIVUS, (tipranavir, TPV)                           None CRIXIVAN, (indinavir, IDV)                           None     KALETRA, (lopinavir + ritonavir, LPV)                None     REYATAZ, (atazanavir, ATV)                           None     PREZISTA, (darunavir, DRV)                           None     LEXIVA, (fosamprenavir, FPV)                         None     FORTOVASE / INVIRASE, (saquinavir, SQV)              None       PI+ drug resistance mutations identified: None               ASSESSMENT:  Patient Active Problem List   Diagnosis    [facility]ymptomatic [facility] ([facility]    H/O syphilis    Abscess of lower back         PLAN:    HIV infection stable continue with  Triumeq (Dolutegravir-abacavir-lamivudine)    Late latent syphilis   RPR stable at 1/16 from a high of 1/128    Viral load pending

## 2023-02-07 NOTE — PROGRESS NOTES
Routine b20 follow up  No smoking  Thc- occ  Drinking occ  Dentist 2 mnths ago encouraged follow up  Meds goond no misss   [address] DEE-- April next follow up  Reviewed labs  VL pending    Hepatitis B HEPATITIS B SURFACE ANTIBODY 1/9/2019 REACTIVE mIU/mL   Reviewed needed vaccines  Immune to Hep B  Refused flu or pnemo     Will call office if needed

## 2023-02-09 LAB
HIV QUANT LOG: 1.37 LOG CPY/ML
HIV-1 RNA BY PCR, QN: 23.2 CPY/ML
HIV-1 RNA BY PCR, QN: DETECTED
SOURCE: ABNORMAL

## 2023-02-12 NOTE — TELEPHONE ENCOUNTER
Pt called into clinic requesting help with basic needs. Requesting food/gas voucher    Pt into [facility]  Needs assessed. 1 food voucher given to pt. Denies other needs at this time and will call with concerns and/or updates      Denies any needs at this time. Denies any issues that need Doctor attention.  Will call with concerns
difficulty breathing

## 2023-02-17 ENCOUNTER — TELEPHONE (OUTPATIENT)
Dept: INFECTIOUS DISEASES | Age: 33
End: 2023-02-17

## 2023-02-17 NOTE — TELEPHONE ENCOUNTER
Per request, CM scheduled transport to appointment with legal on 2/27/23 @ 9AM.  CM assessed need, arrangements as last resort.

## 2023-02-22 ENCOUNTER — TELEPHONE (OUTPATIENT)
Dept: INFECTIOUS DISEASES | Age: 33
End: 2023-02-22

## 2023-02-22 NOTE — TELEPHONE ENCOUNTER
CM called pt to remind him of transportation 2/23/23 for Support Group Meeting at [address] [place] [address]. [employer]:

## 2023-03-01 ENCOUNTER — TELEPHONE (OUTPATIENT)
Dept: INFECTIOUS DISEASES | Age: 33
End: 2023-03-01

## 2023-03-01 NOTE — TELEPHONE ENCOUNTER
Change of deodorant. and soap. No has rash under his arms. Itchy at times. Red. No drainage. Degree deodorant he thinks was the issue. Will go back to normal soap and deodorant and call office with update Friday. We were able to discuss up coming support group. Date 3.30.23      Is interested in coming. needing help with transport for upcoming      Rn and pt reviewed additional transport options and assistance. It is determined he will use [facility] as last report for help. Discussed transport policy. Discussed need to call 2 hours prior to apt time for cancellation. Pt verbalized all understanding. [facility] notified. Pt aware transport will be there for  about 30 mins prior to apt. All appts are related to medical treatment and are necessary for compliance  This transportation assistance will help patient remain in medical care by enabling them to access medical and support services. 3.30.23 at [facility] 6471 group     Denies any needs at this time. Denies any issues that need Doctor attention.  Will call with concerns

## 2023-05-10 RX ORDER — ABACAVIR SULFATE, DOLUTEGRAVIR SODIUM, LAMIVUDINE 600; 50; 300 MG/1; MG/1; MG/1
TABLET, FILM COATED ORAL
Qty: 30 TABLET | Refills: 3 | Status: SHIPPED | OUTPATIENT
Start: 2023-05-10

## 2023-06-30 ENCOUNTER — TELEPHONE (OUTPATIENT)
Dept: INFECTIOUS DISEASES | Age: 33
End: 2023-06-30

## 2023-06-30 DIAGNOSIS — Z21 ASYMPTOMATIC HIV INFECTION (HCC): Primary | ICD-10-CM

## 2023-07-03 ENCOUNTER — TELEPHONE (OUTPATIENT)
Dept: INFECTIOUS DISEASES | Age: 33
End: 2023-07-03

## 2023-07-03 NOTE — TELEPHONE ENCOUNTER
Per request, CM scheduled transport to Support Group Meeting on 7/27/23 @ 12:30P.  CM assessed need, arrangements as last resort.

## 2023-07-06 ENCOUNTER — TELEPHONE (OUTPATIENT)
Dept: INFECTIOUS DISEASES | Age: 33
End: 2023-07-06

## 2023-07-06 DIAGNOSIS — Z21 ASYMPTOMATIC HIV INFECTION (HCC): ICD-10-CM

## 2023-07-06 LAB
ALBUMIN SERPL-MCNC: 4.3 G/DL (ref 3.5–4.6)
ALP SERPL-CCNC: 85 U/L (ref 35–104)
ALT SERPL-CCNC: 18 U/L (ref 0–41)
ANION GAP SERPL CALCULATED.3IONS-SCNC: 9 MEQ/L (ref 9–15)
AST SERPL-CCNC: 22 U/L (ref 0–40)
BILIRUB SERPL-MCNC: 0.3 MG/DL (ref 0.2–0.7)
BUN SERPL-MCNC: 10 MG/DL (ref 6–20)
CALCIUM SERPL-MCNC: 9.1 MG/DL (ref 8.5–9.9)
CHLORIDE SERPL-SCNC: 105 MEQ/L (ref 95–107)
CO2 SERPL-SCNC: 26 MEQ/L (ref 20–31)
CREAT SERPL-MCNC: 0.84 MG/DL (ref 0.7–1.2)
ERYTHROCYTE [DISTWIDTH] IN BLOOD BY AUTOMATED COUNT: 13.6 % (ref 11.5–14.5)
GLOBULIN SER CALC-MCNC: 3.5 G/DL (ref 2.3–3.5)
GLUCOSE SERPL-MCNC: 94 MG/DL (ref 70–99)
HCT VFR BLD AUTO: 44.2 % (ref 42–52)
HGB BLD-MCNC: 14.9 G/DL (ref 14–18)
MCH RBC QN AUTO: 30 PG (ref 27–31.3)
MCHC RBC AUTO-ENTMCNC: 33.7 % (ref 33–37)
MCV RBC AUTO: 88.8 FL (ref 79–92.2)
PLATELET # BLD AUTO: 181 K/UL (ref 130–400)
POTASSIUM SERPL-SCNC: 3.2 MEQ/L (ref 3.4–4.9)
PROT SERPL-MCNC: 7.8 G/DL (ref 6.3–8)
RBC # BLD AUTO: 4.98 M/UL (ref 4.7–6.1)
SODIUM SERPL-SCNC: 140 MEQ/L (ref 135–144)
WBC # BLD AUTO: 4 K/UL (ref 4.8–10.8)

## 2023-07-06 NOTE — TELEPHONE ENCOUNTER
Pt called into clinicrequesting help with basic needs. Requesting food/gas voucher    Pt into clinc  Needs assessed. 2 food vouchers given to pt via [facility]    Denies any needs at this time. Denies any issues that need Doctor attention.  Will call with concerns

## 2023-07-06 NOTE — TELEPHONE ENCOUNTER
Per request, CM scheduled transport to appointment with [facility] om 7/19/23 @ 8:30AM.  CM assessed need, arrangements as last resort.

## 2023-07-07 LAB
CD3+CD4+ CELLS # BLD: 697 CELLS/UL (ref 430–1800)
CD4 % HELPER T CELL: 28 % (ref 32–64)
IMMUNODEFICIENCY MARKERS SPEC-IMP: ABNORMAL
RPR SER QL: REACTIVE
RPR TITER: NORMAL

## 2023-07-08 LAB
HIV QUANT LOG: <1.3 LOG CPY/ML
HIV-1 RNA BY PCR, QN: <20 CPY/ML
HIV-1 RNA BY PCR, QN: DETECTED
SOURCE: ABNORMAL

## 2023-07-21 ENCOUNTER — TELEPHONE (OUTPATIENT)
Dept: INFECTIOUS DISEASES | Age: 33
End: 2023-07-21

## 2023-07-26 ENCOUNTER — TELEPHONE (OUTPATIENT)
Dept: INFECTIOUS DISEASES | Age: 33
End: 2023-07-26

## 2023-07-26 NOTE — TELEPHONE ENCOUNTER
CM called to remind pt of appointment with Dr. Reynaldo Doyle at [facility] and 12:30PM for Support Group on 7/27/23. Transportation scheduled for both appointments.

## 2023-07-27 ENCOUNTER — NURSE ONLY (OUTPATIENT)
Dept: INFECTIOUS DISEASES | Age: 33
End: 2023-07-27

## 2023-07-27 ENCOUNTER — OFFICE VISIT (OUTPATIENT)
Dept: INFECTIOUS DISEASES | Age: 33
End: 2023-07-27
Payer: MEDICAID

## 2023-07-27 VITALS
BODY MASS INDEX: 42.96 KG/M2 | WEIGHT: 308 LBS | SYSTOLIC BLOOD PRESSURE: 125 MMHG | HEART RATE: 65 BPM | DIASTOLIC BLOOD PRESSURE: 69 MMHG | TEMPERATURE: 97.2 F

## 2023-07-27 DIAGNOSIS — Z76.89 ENCOUNTER TO ESTABLISH CARE WITH NEW DOCTOR: Primary | ICD-10-CM

## 2023-07-27 DIAGNOSIS — Z21 ASYMPTOMATIC HIV INFECTION (HCC): ICD-10-CM

## 2023-07-27 DIAGNOSIS — Z86.19 H/O SYPHILIS: ICD-10-CM

## 2023-07-27 PROCEDURE — G8417 CALC BMI ABV UP PARAM F/U: HCPCS | Performed by: INTERNAL MEDICINE

## 2023-07-27 PROCEDURE — G8427 DOCREV CUR MEDS BY ELIG CLIN: HCPCS | Performed by: INTERNAL MEDICINE

## 2023-07-27 PROCEDURE — 99213 OFFICE O/P EST LOW 20 MIN: CPT | Performed by: INTERNAL MEDICINE

## 2023-07-27 PROCEDURE — 1036F TOBACCO NON-USER: CPT | Performed by: INTERNAL MEDICINE

## 2023-07-27 ASSESSMENT — PATIENT HEALTH QUESTIONNAIRE - PHQ9
SUM OF ALL RESPONSES TO PHQ QUESTIONS 1-9: 0
2. FEELING DOWN, DEPRESSED OR HOPELESS: 0
SUM OF ALL RESPONSES TO PHQ QUESTIONS 1-9: 0
1. LITTLE INTEREST OR PLEASURE IN DOING THINGS: 0
SUM OF ALL RESPONSES TO PHQ QUESTIONS 1-9: 0
SUM OF ALL RESPONSES TO PHQ QUESTIONS 1-9: 0
SUM OF ALL RESPONSES TO PHQ9 QUESTIONS 1 & 2: 0

## 2023-07-27 ASSESSMENT — ENCOUNTER SYMPTOMS
RESPIRATORY NEGATIVE: 1
GASTROINTESTINAL NEGATIVE: 1

## 2023-07-27 NOTE — PROGRESS NOTES
Pt here for routine B20 appointment. Doing well. Compliant with medications? yes    Flu vaccine? Does not take flu vaccines    Pneumonia vacc? Smoking? yes    Etoh? yes    Drugs? no     PCP? Referred     Working? no    Housing? Stabled    Dental? Encouraged     OBGYN: n/a    Eligibility date:  [place] policy date:       U=U and mental health were both discussed at today's appointment. Pt denies any issues with mental health, and verbalizes understanding of U=U. Pt to contact office with any questions or concerns. Phone number to office provided at checkout. Hep B and Hep C discussed.

## 2023-07-27 NOTE — PROGRESS NOTES
Routine b20  Recent cold. Cough moist. Productive. Green phlegm per pt. Denied fevers. Dr Leslie Stephenson updated. No med issues.  Met with CM

## 2023-07-27 NOTE — PROGRESS NOTES
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      Component Ref Range & Units 7/6/23 1107 2/1/23 1001 7/20/22 0921 1/28/22 1044 8/3/21 1034 1/15/21 1110 6/30/20 1121   Lymphocyte Subset Panel 2 Info  See Note  See Note CM  See Note CM  See Note CM  See Note CM  See Note CM  See Note CM       CD4 % [place] T Cell 32 - 64 % 28 Low   27 Low   25 Low   23 Low   22 Low   28 Low   26 Low     Absolute CD 4 [place] 430 - 1800 cells/uL 697  704  544  714  549  537  642            Component Ref Range & Units 7/6/23 1107 2/1/23 1001   Source  PLASMA  PLASMA    HIV-1 RNA by PCR, Qn cpy/mL <20  23.2    HIV Quant Log Log cpy/mL <1.30  1.37    HIV-1 RNA by PCR, Qn NOTDET DETECTED Abnormal   DETECTED Abnormal  CM    Comment:                   Review of Systems   Constitutional: Negative. HENT: Negative. Respiratory: Negative. Cardiovascular: Negative. Gastrointestinal: Negative. Genitourinary: Negative.       Review of Systems: All 14 review of systems negative other than as stated above    Social History     Tobacco Use    Smoking status: Never    Smokeless tobacco: Never   Vaping Use    Vaping Use: Never used   Substance Use Topics    Alcohol use: No    Drug use: Yes     Types: Marijuana (Weed)     Comment: smoked 2 weeks ago         Past Medical History:   Diagnosis Date    HIV (human immunodeficiency virus infection) ([facility])     Immune deficiency disorder ([facility])     HIV not in treatment currently    Late latent syphilis              Current Outpatient Medications on File Prior to Visit   Medication Sig Dispense Refill    TRIUMEQ 600- MG TABS TAKE 1 TABLET BY MOUTH DAILY 30 tablet 3    dicyclomine (BENTYL) 10 MG capsule

## 2023-07-27 NOTE — PROGRESS NOTES
Pt presented for appointment. CM met with pt, completed Semi-Annual Review, updated RW Part A Eligibility. CM noted change in income. CM updated ISP, pt agreed with POC. Plan of Care includes medical, mental health, and transportation. CM provided socialization. Pt complained of \"cold\" that has been lingering. Pt does produce green phlegm. CM encouraged pt to discuss with Dr. Herman Late. CM will follow up with pt as needed.

## 2023-08-15 ENCOUNTER — TELEPHONE (OUTPATIENT)
Dept: INFECTIOUS DISEASES | Age: 33
End: 2023-08-15

## 2023-08-15 DIAGNOSIS — Z21 ASYMPTOMATIC HIV INFECTION (HCC): Primary | ICD-10-CM

## 2023-08-15 NOTE — TELEPHONE ENCOUNTER
ISP is due. Spoke with pt.  . Eligibility 12.22.22  Phone apt scheduled for 9.6.23 at 21  with CM    6 month b20 apt scheduled as well

## 2023-08-21 ENCOUNTER — TELEPHONE (OUTPATIENT)
Dept: INFECTIOUS DISEASES | Age: 33
End: 2023-08-21

## 2023-08-21 NOTE — TELEPHONE ENCOUNTER
Per request, CM scheduled transport to appointment at [facility] on 8/14/23 @ 8:30AM.  CM assessed need, arrangements as last resort.

## 2023-08-31 RX ORDER — ABACAVIR SULFATE, DOLUTEGRAVIR SODIUM, LAMIVUDINE 600; 50; 300 MG/1; MG/1; MG/1
TABLET, FILM COATED ORAL
Qty: 30 TABLET | Refills: 3 | Status: SHIPPED | OUTPATIENT
Start: 2023-08-31

## 2023-09-05 ENCOUNTER — TELEPHONE (OUTPATIENT)
Dept: INFECTIOUS DISEASES | Age: 33
End: 2023-09-05

## 2023-09-05 NOTE — TELEPHONE ENCOUNTER
Per request, CM scheduled transport for Legal on 9/7/23 @ 12:45P.  CM assessed need, arrangements as last resort.

## 2023-09-06 ENCOUNTER — TELEPHONE (OUTPATIENT)
Dept: INFECTIOUS DISEASES | Age: 33
End: 2023-09-06

## 2023-09-15 DIAGNOSIS — Z21 ASYMPTOMATIC HIV INFECTION (HCC): Primary | ICD-10-CM

## 2023-10-03 ENCOUNTER — TELEPHONE (OUTPATIENT)
Dept: INFECTIOUS DISEASES | Age: 33
End: 2023-10-03
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
## 2023-10-10 ENCOUNTER — TELEPHONE (OUTPATIENT)
Dept: INFECTIOUS DISEASES | Age: 33
End: 2023-10-10

## 2023-10-10 NOTE — TELEPHONE ENCOUNTER
Pt called in with concerns regarding new rash. on and off. Hive like- mainly torso (under shirt)   Itchy  Changed his detergent recently to arm and hammer. States rash did start when he originally changed but has been on and off. Rica Sotomayor updated and will monitor.

## 2023-10-13 NOTE — TELEPHONE ENCOUNTER
Call placed to pt as follow up. Rash is almost gone. Feels better. Will call if further issues arise.

## 2023-10-17 ENCOUNTER — TELEPHONE (OUTPATIENT)
Dept: INFECTIOUS DISEASES | Age: 33
End: 2023-10-17

## 2023-10-17 NOTE — TELEPHONE ENCOUNTER
Per request, CM scheduled transport for appointment with Dentist on 10/17/23 @ 10:30AM.  CM assessed need, arrangements as last resort. Pt is aware of Transportation Policy.  10/19/23 @ 10AM  Pt's residence:  [address] [place] [address]., [address]  [address] [place]:  [facility]  [address] [facility] [address]

## 2023-10-18 ENCOUNTER — TELEPHONE (OUTPATIENT)
Dept: INFECTIOUS DISEASES | Age: 33
End: 2023-10-18

## 2023-10-26 ENCOUNTER — TELEPHONE (OUTPATIENT)
Dept: INFECTIOUS DISEASES | Age: 33
End: 2023-10-26

## 2023-10-26 NOTE — TELEPHONE ENCOUNTER
Pt called, stated he thinks a spider crawled into his ear. Pt does not think the spider is still in his ear but has a \"funny sensation and what to make sure. Bella Lucio \"  Pt would like to see RN in [address] Per request, CM scheduled transport on 10/27/23 @ 10:30AM.  CM encouraged pt to go to [facility] if RN is not available. CM assessed need, arrangments as last resort. Pt is aware of Transportation Policy. : 10/27/23 @ 10:30AM  Pt's residence:  [address] [place] [address]., [address]  [place] [address]    [address] [place] [address] Physician Offices  [facility] [address]    Request is for round trip.

## 2023-10-27 ENCOUNTER — OFFICE VISIT (OUTPATIENT)
Dept: FAMILY MEDICINE CLINIC | Age: 33
End: 2023-10-27
Payer: MEDICAID

## 2023-10-27 VITALS
HEIGHT: 70 IN | BODY MASS INDEX: 43.72 KG/M2 | TEMPERATURE: 96.5 F | OXYGEN SATURATION: 97 % | DIASTOLIC BLOOD PRESSURE: 70 MMHG | SYSTOLIC BLOOD PRESSURE: 130 MMHG | RESPIRATION RATE: 16 BRPM | WEIGHT: 305.4 LBS | HEART RATE: 65 BPM

## 2023-10-27 DIAGNOSIS — H61.891 FOREIGN BODY SENSATION IN EAR CANAL, RIGHT: Primary | ICD-10-CM

## 2023-10-27 PROCEDURE — G8417 CALC BMI ABV UP PARAM F/U: HCPCS | Performed by: NURSE PRACTITIONER

## 2023-10-27 PROCEDURE — 1036F TOBACCO NON-USER: CPT | Performed by: NURSE PRACTITIONER

## 2023-10-27 PROCEDURE — G8427 DOCREV CUR MEDS BY ELIG CLIN: HCPCS | Performed by: NURSE PRACTITIONER

## 2023-10-27 PROCEDURE — 99212 OFFICE O/P EST SF 10 MIN: CPT | Performed by: NURSE PRACTITIONER

## 2023-10-27 PROCEDURE — G8484 FLU IMMUNIZE NO ADMIN: HCPCS | Performed by: NURSE PRACTITIONER

## 2023-10-27 SDOH — ECONOMIC STABILITY: FOOD INSECURITY: WITHIN THE PAST 12 MONTHS, YOU WORRIED THAT YOUR FOOD WOULD RUN OUT BEFORE YOU GOT MONEY TO BUY MORE.: NEVER TRUE

## 2023-10-27 SDOH — ECONOMIC STABILITY: FOOD INSECURITY: WITHIN THE PAST 12 MONTHS, THE FOOD YOU BOUGHT JUST DIDN'T LAST AND YOU DIDN'T HAVE MONEY TO GET MORE.: NEVER TRUE

## 2023-10-27 SDOH — ECONOMIC STABILITY: HOUSING INSECURITY
IN THE LAST 12 MONTHS, WAS THERE A TIME WHEN YOU DID NOT HAVE A STEADY PLACE TO SLEEP OR SLEPT IN A SHELTER (INCLUDING NOW)?: NO

## 2023-10-27 SDOH — ECONOMIC STABILITY: INCOME INSECURITY: HOW HARD IS IT FOR YOU TO PAY FOR THE VERY BASICS LIKE FOOD, HOUSING, MEDICAL CARE, AND HEATING?: VERY HARD

## 2023-10-27 ASSESSMENT — ENCOUNTER SYMPTOMS
TROUBLE SWALLOWING: 0
SORE THROAT: 0
RHINORRHEA: 0
SHORTNESS OF BREATH: 0
VOMITING: 0
COUGH: 0
WHEEZING: 0
NAUSEA: 0
DIARRHEA: 0

## 2023-11-01 ENCOUNTER — TELEPHONE (OUTPATIENT)
Dept: INFECTIOUS DISEASES | Age: 33
End: 2023-11-01
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
## 2023-11-03 ENCOUNTER — TELEPHONE (OUTPATIENT)
Dept: INFECTIOUS DISEASES | Age: 33
End: 2023-11-03

## 2023-11-03 NOTE — TELEPHONE ENCOUNTER
Pt called to cancel transport close to the time of scheduled  today, stating \"Something came up\", needed to reschedule for lab transport on 11/6/23 @ 10AM.  CM reminded pt of Transportation Policy. Pt is aware of Policy and was apologetic. CM made arrangements as last resort. Need assessed. : 11/6/23 @ 10AM  Pt's residence:  [address] [place] [address]., [address]  [address] [place] [address]    Request for round trip.

## 2023-11-06 DIAGNOSIS — Z21 ASYMPTOMATIC HIV INFECTION (HCC): ICD-10-CM

## 2023-11-06 LAB
ALBUMIN SERPL-MCNC: 4.4 G/DL (ref 3.5–4.6)
ALP SERPL-CCNC: 89 U/L (ref 35–104)
ALT SERPL-CCNC: 29 U/L (ref 0–41)
ANION GAP SERPL CALCULATED.3IONS-SCNC: 10 MEQ/L (ref 9–15)
AST SERPL-CCNC: 27 U/L (ref 0–40)
BILIRUB SERPL-MCNC: 0.3 MG/DL (ref 0.2–0.7)
BUN SERPL-MCNC: 13 MG/DL (ref 6–20)
CALCIUM SERPL-MCNC: 9.1 MG/DL (ref 8.5–9.9)
CHLORIDE SERPL-SCNC: 105 MEQ/L (ref 95–107)
CHOLEST SERPL-MCNC: 192 MG/DL (ref 0–199)
CO2 SERPL-SCNC: 26 MEQ/L (ref 20–31)
CREAT SERPL-MCNC: 0.85 MG/DL (ref 0.7–1.2)
ERYTHROCYTE [DISTWIDTH] IN BLOOD BY AUTOMATED COUNT: 12.4 % (ref 11.5–14.5)
GLOBULIN SER CALC-MCNC: 3.6 G/DL (ref 2.3–3.5)
GLUCOSE SERPL-MCNC: 80 MG/DL (ref 70–99)
HCT VFR BLD AUTO: 44.6 % (ref 42–52)
HDLC SERPL-MCNC: 43 MG/DL (ref 40–59)
HGB BLD-MCNC: 14.9 G/DL (ref 14–18)
LDLC SERPL CALC-MCNC: 121 MG/DL (ref 0–129)
MCH RBC QN AUTO: 29.9 PG (ref 27–31.3)
MCHC RBC AUTO-ENTMCNC: 33.4 % (ref 33–37)
MCV RBC AUTO: 89.4 FL (ref 79–92.2)
PLATELET # BLD AUTO: 210 K/UL (ref 130–400)
POTASSIUM SERPL-SCNC: 3.7 MEQ/L (ref 3.4–4.9)
PROT SERPL-MCNC: 8 G/DL (ref 6.3–8)
RBC # BLD AUTO: 4.99 M/UL (ref 4.7–6.1)
SODIUM SERPL-SCNC: 141 MEQ/L (ref 135–144)
TRIGL SERPL-MCNC: 140 MG/DL (ref 0–150)
WBC # BLD AUTO: 4.5 K/UL (ref 4.8–10.8)

## 2023-11-08 LAB
C TRACH DNA UR QL NAA+PROBE: NEGATIVE
CD3+CD4+ CELLS # BLD: 716 CELLS/UL (ref 430–1800)
CD4 % HELPER T CELL: 29 % (ref 32–64)
IMMUNODEFICIENCY MARKERS SPEC-IMP: ABNORMAL

## 2023-11-16 ENCOUNTER — OFFICE VISIT (OUTPATIENT)
Dept: INFECTIOUS DISEASES | Age: 33
End: 2023-11-16

## 2023-11-16 ENCOUNTER — TELEPHONE (OUTPATIENT)
Dept: INFECTIOUS DISEASES | Age: 33
End: 2023-11-16

## 2023-11-16 VITALS
WEIGHT: 302.2 LBS | SYSTOLIC BLOOD PRESSURE: 133 MMHG | BODY MASS INDEX: 43.36 KG/M2 | HEART RATE: 62 BPM | TEMPERATURE: 97.2 F | DIASTOLIC BLOOD PRESSURE: 84 MMHG

## 2023-11-16 DIAGNOSIS — Z21 ASYMPTOMATIC HIV INFECTION (HCC): Primary | ICD-10-CM

## 2023-11-16 RX ORDER — ZIPRASIDONE HYDROCHLORIDE 40 MG/1
CAPSULE ORAL
COMMUNITY
Start: 2023-09-27

## 2023-11-16 ASSESSMENT — PATIENT HEALTH QUESTIONNAIRE - PHQ9
SUM OF ALL RESPONSES TO PHQ QUESTIONS 1-9: 0
2. FEELING DOWN, DEPRESSED OR HOPELESS: 0
SUM OF ALL RESPONSES TO PHQ QUESTIONS 1-9: 0
SUM OF ALL RESPONSES TO PHQ9 QUESTIONS 1 & 2: 0
SUM OF ALL RESPONSES TO PHQ QUESTIONS 1-9: 0
1. LITTLE INTEREST OR PLEASURE IN DOING THINGS: 0
SUM OF ALL RESPONSES TO PHQ QUESTIONS 1-9: 0

## 2023-11-16 ASSESSMENT — ENCOUNTER SYMPTOMS
GASTROINTESTINAL NEGATIVE: 1
RESPIRATORY NEGATIVE: 1

## 2023-11-16 NOTE — TELEPHONE ENCOUNTER
Per request, CM scheduled transport to appointment with [facility] 11/17/23 @ 11:30AM.  CM assessed need, arrangements as last resort. Pt is aware of Transportation Policy. : 11/17/23 @ 11A  Pt's residence  [address] [facility]  [address]  [address] [place] [address]    Request is for round trip.

## 2023-11-16 NOTE — PROGRESS NOTES
Routine apt today. Compliant with medications ---   All supplentments dicussed and educatioon providied pt keeps all sepatre from ART     Current partner -- 1 with protection   HIV + or no     Flu vaccine discussed. Refuses  Pneumonia vacc? -- refuses  encouraged vaccine and education provided. Hep B vaccine/screening -- screened  Refuses vaccines       Smoking? No smoking      Etoh? occ once weekly   Denies this as a problem     Drugs? thc    Ready to stop-- yes has cut back a lot    Transportation: policy reviewed     PCP? y     Working? [address]? housing concerns. Pt is established with [address] [facility]. He verbalized he will call and discuss concerns. Dental-- Encouraged -- last month - [facility]        Support group? ? Aware- would like to come to Nov group        HIV consoling as needed: denies issues or questions. U=U was discussed at today's appointment. Pt  verbalizes understanding of U=U. Support group 11.30.23  Rn and pt reviewed additional transport options and assistance. It is determined he will use [facility] as last report for help. Discussed transport policy. Discussed need to call 2 hours prior to apt time for cancellation. Pt verbalized all understanding. [facility] notified. Pt aware transport will be there for  about 30 mins prior to apt. All appts are related to medical treatment and are necessary for compliance  This transportation assistance will help patient remain in medical care by enabling them to access medical and support services. RN scheduled transportation per pt request,     location and  time:   Residency:   [address] [place] [address]  [phone] (home)       Appt Location and apt time:   11.30.23 1-230 Support group   [address] [facility]   Round trip    Denies any needs at this time. Denies any issues that need Doctor attention.  Will call with concerns
Enhanced Report using either link below:     -Direct access: [email or web address]     -Enter Username, Password: [email or web address]    Username: [email or web address]    Password: [email or web address]   Performed by [address] [phone]   www. Junie Andino MD - Lab.  Director    HIV-1 Genotype 01/09/2019  2:37 PM [facility]   See Note    Comment:     Drug Resistance:   NRTI Drug Class       VIDEX, (didanosine, ddI)                             None     VIREAD, (tenofovir, TDF)                             None     ZERIT, (stavudine, d4T)                              None     ZIAGEN, (abacavir, ABC)                              None     EMTRIVA, (emtricitabine, FTC)                        None     RETROVIR, (zidovudine, ZDV)                          None     EPIVIR, (lamivudine, 3TC)                            None       NRTI drug resistance mutations identified: None     NNRTI Drug Class       SUSTIVA, (efavirenz, EFV)                            None     VIRAMUNE, (nevirapine, NVP)                          None     INTELENCE, (etravirine, ETR)                         None     EDURANT, (rilpivirine, RPV)                          None       NNRTI drug resistance mutations identified: V108I     PI+ Drug Class       VIRACEPT, (nelfinavir, NFV)                          None     APTIVUS, (tipranavir, TPV)                           None     CRIXIVAN, (indinavir, IDV)                           None     KALETRA, (lopinavir + ritonavir, LPV)                None     REYATAZ, (atazanavir, ATV)                           None     PREZISTA, (darunavir, DRV)                           None     LEXIVA, (fosamprenavir, FPV)                         None     FORTOVASE / INVIRASE, (saquinavir, SQV)              None       PI+ drug resistance mutations identified: None               ASSESSMENT:  Patient Active Problem List   Diagnosis    Asymptomatic HIV infection (HCC)    H/O syphilis

## 2023-11-30 ENCOUNTER — TELEPHONE (OUTPATIENT)
Dept: INFECTIOUS DISEASES | Age: 33
End: 2023-11-30

## 2023-12-01 ENCOUNTER — TELEPHONE (OUTPATIENT)
Dept: INFECTIOUS DISEASES | Age: 33
End: 2023-12-01

## 2023-12-01 NOTE — TELEPHONE ENCOUNTER
Pt presented for Support Group Meeting. Pt was an active participant. Information shared regarding Open Enrollment to obtain or change insurance coverage, VL testing and the importance of medication compliance. Planning [place] members shared information on surveys to be conducted and [employer] " between January 2024 and March 2024. Discussion included housing and budget cuts projected for HIV services. Group members decided in December to hold a \"Peer Group\" to further develop supportive relationships and camaraderie amongst the Members. Group to be held Thursday, 12/21/23 @ 1PM.  HIV Support Group will reconvene 1/25/24 @ 1PM.  Per request, CM scheduled transport to both Groups. CM assessed need, arrangements as last resort. Pt is aware of Transportation Policy.  for 12/21/23 and 1/25/24 @ 12:30PM.  Pt's residence:  [address] [place] [address].   [place] [phone] for Group Meetings  [address]

## 2023-12-01 NOTE — TELEPHONE ENCOUNTER
Per request, CM scheduled transport to appointment with Legal on 12/4/23 @ 11AM.  CM assessed need, arrangements as last resort. Pt is aware of Transportation Policy.     : @ 10:15A  Pt's residence:  [address] [place] [address]., [address]  [facility] [address]    Destination:  Probation Office  [address] [facility] [address] [address]

## 2023-12-08 ENCOUNTER — TELEPHONE (OUTPATIENT)
Dept: INFECTIOUS DISEASES | Age: 33
End: 2023-12-08

## 2023-12-08 NOTE — TELEPHONE ENCOUNTER
Per request, CM scheduled transport to appointment with [facility] on 12/12/23 @ 11:30AM.  CM assessed need, arrangements as last resort. Pt is aware of Transportation Policy.     : 12/12/23 @ 11AM  Pt's residence  [address] [facility]  [address]:  [address]   [address] [place] [address]

## 2024-01-16 ENCOUNTER — TELEPHONE (OUTPATIENT)
Dept: INFECTIOUS DISEASES | Age: 34
End: 2024-01-16

## 2024-01-16 NOTE — TELEPHONE ENCOUNTER
Call placed to pt regarding need to update RW eligibility and paperwork.   Pt is on transport. Urgency noted.   Phone not taking calls. SMS sent.

## 2024-01-22 ENCOUNTER — TELEPHONE (OUTPATIENT)
Dept: INFECTIOUS DISEASES | Age: 34
End: 2024-01-22

## 2024-01-22 DIAGNOSIS — Z21 ASYMPTOMATIC HIV INFECTION (HCC): Primary | ICD-10-CM

## 2024-01-22 NOTE — TELEPHONE ENCOUNTER
Call placed to pt regarding need to update RW eligibility and paperwork.     Reviewed all needed documentation for update. This includes proof of income ( 1 month total, most recent) proof of residency and/or ID as well as any insurance updates or changes.      pt is aware of the program, verbalized understanding and denies questions.     JIMBO apt made 1.31.24 via phone - phone number updated      Denies other issues or concerns and will call office if needed

## 2024-01-23 ENCOUNTER — TELEPHONE (OUTPATIENT)
Dept: INFECTIOUS DISEASES | Age: 34
End: 2024-01-23

## 2024-01-23 NOTE — TELEPHONE ENCOUNTER
Pt called in with health concern. States he cut his toe nails and now has infection on BL big toes.   States it is draining and has noted puss.   Dicussed need for apt/evaluation. Possible culture needed. Pt asking for antibx.   Evaluation needed.     Offered pt 845 apt with Dr Stephenson tomorrow to assess toe. He rather see ID then go to walk in clinic. We discussed both options.   Added to scheduled for tomorrow 845    Pt is out of Eligibility window.     Goran Larios Annual Eligibility completed today via phone.   All Goran Larios paperwork reviewed and discussed. Pt aware that  funds are only to be used as last resort. CASSIE and consents reviewed and signed. Transport, Grievance and sliding fee scale reviewed. Pt denied any questions regarding program.     Annual review-   Drinking- occ. Weekly. Denies an issue  Smoking- denies  Drug use--THC- cutting back  MH- DEE routinely   Dental-- last month [facility]- encouraged follow up   Housing: stable  U=U-- aware  HIV consoling: denies need. Not SA denies concerns with STD.   HIV care: hospital out patient center           Rn and pt reviewed additional transport options and assistance. It is determined he will use [facility] as last report for help.   Discussed transport policy. Discussed need to call 2 hours prior to apt time for cancellation. Pt verbalized all understanding.   [facility] notified. Pt aware transport will be there for  about 30 mins prior to apt.     All appts are related to medical treatment and are necessary for compliance  This transportation assistance will help patient remain in medical care by enabling them to access medical and support services.    RN scheduled transportation per pt request,     location and  time: 815  Residency:   [address]  [facility] [id number] [phone] (home)       Appt Location and apt time:   1.24.24 at 845 Dr Stephenson  [address] [place]     Round trip    Denies any needs at this time. Denies any

## 2024-01-24 ENCOUNTER — OFFICE VISIT (OUTPATIENT)
Dept: INFECTIOUS DISEASES | Age: 34
End: 2024-01-24
Payer: MEDICAID

## 2024-01-24 VITALS
BODY MASS INDEX: 43.82 KG/M2 | HEIGHT: 71 IN | SYSTOLIC BLOOD PRESSURE: 124 MMHG | TEMPERATURE: 97.7 F | DIASTOLIC BLOOD PRESSURE: 76 MMHG | HEART RATE: 69 BPM | RESPIRATION RATE: 16 BRPM | OXYGEN SATURATION: 99 % | WEIGHT: 313 LBS

## 2024-01-24 DIAGNOSIS — L03.032 PARONYCHIA OF GREAT TOE OF LEFT FOOT: Primary | ICD-10-CM

## 2024-01-24 DIAGNOSIS — Z21 ASYMPTOMATIC HIV INFECTION (HCC): ICD-10-CM

## 2024-01-24 PROCEDURE — G8484 FLU IMMUNIZE NO ADMIN: HCPCS | Performed by: INTERNAL MEDICINE

## 2024-01-24 PROCEDURE — 1036F TOBACCO NON-USER: CPT | Performed by: INTERNAL MEDICINE

## 2024-01-24 PROCEDURE — G8427 DOCREV CUR MEDS BY ELIG CLIN: HCPCS | Performed by: INTERNAL MEDICINE

## 2024-01-24 PROCEDURE — 99213 OFFICE O/P EST LOW 20 MIN: CPT | Performed by: INTERNAL MEDICINE

## 2024-01-24 PROCEDURE — G8417 CALC BMI ABV UP PARAM F/U: HCPCS | Performed by: INTERNAL MEDICINE

## 2024-01-24 RX ORDER — CEPHALEXIN 500 MG/1
500 CAPSULE ORAL 3 TIMES DAILY
Qty: 30 CAPSULE | Refills: 0 | Status: SHIPPED | OUTPATIENT
Start: 2024-01-24 | End: 2024-02-03

## 2024-01-24 ASSESSMENT — PATIENT HEALTH QUESTIONNAIRE - PHQ9
SUM OF ALL RESPONSES TO PHQ9 QUESTIONS 1 & 2: 0
SUM OF ALL RESPONSES TO PHQ QUESTIONS 1-9: 0
SUM OF ALL RESPONSES TO PHQ QUESTIONS 1-9: 0
2. FEELING DOWN, DEPRESSED OR HOPELESS: 0
1. LITTLE INTEREST OR PLEASURE IN DOING THINGS: 0
SUM OF ALL RESPONSES TO PHQ QUESTIONS 1-9: 0
SUM OF ALL RESPONSES TO PHQ QUESTIONS 1-9: 0

## 2024-01-24 NOTE — PROGRESS NOTES
Trell Alfonso (:  1990) is a 33 y.o. male,Established patient, here for evaluation of the following chief complaint(s):  Wound Check (L great toe infection)         ASSESSMENT/PLAN:  Left great toe paronychia with abscess  HIV infection with suppressed virus    Keflex 500 mg p.o. 3 times daily for 10 days  Topical bacitracin ointment twice daily  Follow-up in 1 week as needed  Call back if worsening condition        Subjective   SUBJECTIVE/OBJECTIVE:  HPI  Patient called office with left great toe purulent drainage after he cut his toenail few days ago.  Patient has severe pain.  No redness or swelling.   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   Denies cigarette smoking.   HIV with suppressed virus and 100% compliance with medication  Review of Systems   All other systems reviewed and are negative.         Objective   Physical Exam     Vitals:    24 0848   BP: 124/76   Pulse: 69   Resp: 16   Temp: 97.7 °F (36.5 °C)   TempSrc: Temporal   SpO2: 99%   Weight: (!) 142 kg (313 lb)   Height: 1.803 m (5' 11\")     General Appearance: alert and oriented to person, place and time, well-developed and well-nourished, in no acute distress  Skin: warm and dry, no rash.   Head: normocephalic and atraumatic  Eyes: anicteric sclerae  ENT:  normal mucous membranes.   Lungs: normal respiratory effort  Abdomen: No distention  No leg edema  No erythema  Left great toenail is ingrown over medial aspect with positive severe tenderness, small amount of purulent drainage        An electronic signature was used to authenticate this note.    --Ni Stephenson MD   
change  HIV risk counseling--- yes  Mental health-- yes  Substance abuse--- no (recently quit smoking marijuana).    Pt is aware of U=U    Pt discussed he will be off probation within the next month, after he finishes paying off fines.      Reviewed upcoming support group date. Pt now unable to make support group. Transport cancelled 01/25/24 @ 1p. Call placed to [facility]. Transport canceled with in appropriate time frame.     Per pt request [facility] [place] [place] after today's appointment. Spoke with Orlin, pt added on.

## 2024-01-29 ENCOUNTER — TELEPHONE (OUTPATIENT)
Dept: INFECTIOUS DISEASES | Age: 34
End: 2024-01-29

## 2024-01-29 NOTE — TELEPHONE ENCOUNTER
Pt states that his toe is much better. Swelling went down dramatically. Still sore but \"better\"   Pt denies concerns. Will call later this week should issue arise.

## 2024-01-30 ENCOUNTER — TELEPHONE (OUTPATIENT)
Dept: INFECTIOUS DISEASES | Age: 34
End: 2024-01-30

## 2024-01-30 NOTE — TELEPHONE ENCOUNTER
Per request, CM scheduled transport to appointment with Legal on 2/1/24 @ 8:15AM.  CM assessed need, arrangements as last resort.  Pt is aware of Transportation Policy.     : 2/1/24 @ 7:45AM  Pt's residence:  [address] [place] [address], [address]  [place] [address]     Destination:  Probation Office  [address] [place] [address]

## 2024-01-31 ENCOUNTER — NURSE ONLY (OUTPATIENT)
Dept: INFECTIOUS DISEASES | Age: 34
End: 2024-01-31

## 2024-01-31 NOTE — PROGRESS NOTES
Re-Assessment      Patient ID:   Trell Alfonso  Date:   1/31/2024      Client ID:  [id number]    [address]  [place] [id number] [phone] (home)     Family/House:    [] Partner  [] Children  [] Other -     Mental Health:   Hx of MH    Substance Abuse:   Remission from marijuana use  Social History     Socioeconomic History    Marital status: Single     Spouse name: Not on file    Number of children: Not on file    Years of education: Not on file    Highest education level: Not on file   Occupational History    Not on file   Tobacco Use    Smoking status: Never     Passive exposure: Current    Smokeless tobacco: Never   Vaping Use    Vaping Use: Never used   Substance and Sexual Activity    Alcohol use: No    Drug use: Yes     Types: Marijuana (Weed)     Comment: smoked 2 weeks ago    Sexual activity: Not on file   Other Topics Concern    Not on file   Social History Narrative    Not on file     Social Determinants of Health     Financial Resource Strain: High Risk (10/27/2023)    Overall Financial Resource Strain (CARDIA)     Difficulty of Paying Living Expenses: Very hard   Food Insecurity: Not on file (10/27/2023)   Transportation Needs: Unknown (10/27/2023)    PRAPARE - Transportation     Lack of Transportation (Medical): Not on file     Lack of Transportation (Non-Medical): No   Physical Activity: Not on file   Stress: Not on file   Social Connections: Not on file   Intimate Partner Violence: Not on file   Housing Stability: Unknown (10/27/2023)    Housing Stability Vital Sign     Unable to Pay for Housing in the Last Year: Not on file     Number of Places Lived in the Last Year: Not on file     Unstable Housing in the Last Year: No       Housing:   apartment    Health/Healthcare:  HIV Stable    Physician Visit:  Dr. Stephenson 1/24   Dental Visit:   [facility] 2 months ago    CD4:   Lab Results   Component Value Date/Time    LABABSO 716 11/06/2023 10:32 AM       Viral Load:  Lab Results

## 2024-02-09 ENCOUNTER — TELEPHONE (OUTPATIENT)
Dept: INFECTIOUS DISEASES | Age: 34
End: 2024-02-09

## 2024-02-09 NOTE — TELEPHONE ENCOUNTER
Pt received letter from [facility] stating that benefits will continue. Pt wanted to confirm that he will still have Medicaid. CM stated pt will have medicaid, no changes. CM will follow up with pt as needed. Detail Level: Detailed

## 2024-02-10 NOTE — TELEPHONE ENCOUNTER
Per request, CM scheduled transport to appointment with [facility] on 2/12/24 @ 11AM.  CM assessed need, arrangements as last resort.  Pt is aware of Transportation Policy.     on 2/12/24 @ 10:30AM  Pt's residence  [address] [facility] [address] [facility]  [place] [address]    Destination  [facility] [place]   [address]  [place] [address]

## 2024-04-03 PROBLEM — E78.00 HYPERCHOLESTEREMIA: Status: ACTIVE | Noted: 2024-04-03

## 2024-04-30 ENCOUNTER — TELEPHONE (OUTPATIENT)
Dept: INFECTIOUS DISEASES | Age: 34
End: 2024-04-30
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
## 2024-05-03 ENCOUNTER — TELEPHONE (OUTPATIENT)
Dept: INFECTIOUS DISEASES | Age: 34
End: 2024-05-03

## 2024-05-03 NOTE — TELEPHONE ENCOUNTER
Per request, CM scheduled transport to appointment with [facility] on 5/7/24 @ 2PM.  CM assessed need, arrangements as last resort.  Pt is aware of Transportation Policy.     5/7/24 @ 1:30PM  Pt's residence   [address]  [place] [id number]    Destination  [facility]  [address] [place] [address]

## 2024-05-13 NOTE — TELEPHONE ENCOUNTER
Call placed to pt regarding need to update RW eligibility and paperwork. Reviewed all needed documentation for update. This includes proof of income ( 1 month total, most recent) proof of residency and ID.    pt is aware of the program, verbalized understanding and denies questions. CM apt made -- 5.10.21 via phone-- pt will need to provide updated income. Doing well otherwise. meds are ok. No issues or misses.      Denies other issues or concerns and will call office if needed
never used

## 2024-05-28 ENCOUNTER — TELEPHONE (OUTPATIENT)
Dept: INFECTIOUS DISEASES | Age: 34
End: 2024-05-28

## 2024-05-28 NOTE — TELEPHONE ENCOUNTER
Per request, CM scheduled transport for appointment with [facility] on 5/30/24 @ 3PM.  CM assessed need, arrangements as last resort.  Pt is aware of Transportation Policy.       5/30/24 @ 2:30PM  Pt's residence  [address] [place] [address], [address]  [place] [address]    Destination  [facility]  [address] [place] [address]

## 2024-06-06 ENCOUNTER — TELEPHONE (OUTPATIENT)
Dept: INFECTIOUS DISEASES | Age: 34
End: 2024-06-06

## 2024-06-06 DIAGNOSIS — E55.9 VITAMIN D DEFICIENCY: ICD-10-CM

## 2024-06-06 DIAGNOSIS — Z13.220 ENCOUNTER FOR LIPID SCREENING FOR CARDIOVASCULAR DISEASE: ICD-10-CM

## 2024-06-06 DIAGNOSIS — Z13.6 ENCOUNTER FOR LIPID SCREENING FOR CARDIOVASCULAR DISEASE: ICD-10-CM

## 2024-06-06 DIAGNOSIS — Z21 ASYMPTOMATIC HIV INFECTION (HCC): Primary | ICD-10-CM

## 2024-06-06 NOTE — TELEPHONE ENCOUNTER
Call placed to pt as appt reminder. He  also needs lab work completed. Reviewed labs ordered. He will go to [facility] pt lab     Reviewed appt date and time. Denies issues with appt day.   Requested he call if issues arise regarding apt.      Rn and pt reviewed additional transport options and assistance. It is determined he will use [facility] as last report for help.   Discussed transport policy. Discussed need to call 2 hours prior to apt time for cancellation. Pt verbalized all understanding.   [facility] notified. Pt aware transport will be there for  about 30 mins prior to apt.     All appts are related to medical treatment and are necessary for compliance  This transportation assistance will help patient remain in medical care by enabling them to access medical and support services.    RN scheduled transportation per pt request,    1st transport:    location and  time: 10  Residency:   [address]  [place] [id number] [phone] (home)     Appt Location and apt time:    6.10.24 at 1030 - lab  [address]     2nd transport:   Residency -  time: 1230  [address]  [address]  [place] [address]  [phone] (home)      Appt Location date and time:    6.21.24 at 1 -- Dr Lamas  [address]     Both round trips     Denies any concerns or health worries. Housing issues are coming up. Building might be sold. He is working with [facility] housing closely.        Denies any needs at this time. Denies any issues that need Doctor attention. Will call with concerns

## 2024-06-10 DIAGNOSIS — Z21 ASYMPTOMATIC HIV INFECTION (HCC): ICD-10-CM

## 2024-06-10 DIAGNOSIS — E55.9 VITAMIN D DEFICIENCY: ICD-10-CM

## 2024-06-10 DIAGNOSIS — Z13.6 ENCOUNTER FOR LIPID SCREENING FOR CARDIOVASCULAR DISEASE: ICD-10-CM

## 2024-06-10 DIAGNOSIS — Z13.220 ENCOUNTER FOR LIPID SCREENING FOR CARDIOVASCULAR DISEASE: ICD-10-CM

## 2024-06-10 LAB
ALBUMIN SERPL-MCNC: 4.4 G/DL (ref 3.5–4.6)
ALP SERPL-CCNC: 79 U/L (ref 35–104)
ALT SERPL-CCNC: 42 U/L (ref 0–41)
ANION GAP SERPL CALCULATED.3IONS-SCNC: 9 MEQ/L (ref 9–15)
AST SERPL-CCNC: 33 U/L (ref 0–40)
BILIRUB SERPL-MCNC: 0.3 MG/DL (ref 0.2–0.7)
BUN SERPL-MCNC: 11 MG/DL (ref 6–20)
CALCIUM SERPL-MCNC: 9.1 MG/DL (ref 8.5–9.9)
CHLORIDE SERPL-SCNC: 104 MEQ/L (ref 95–107)
CHOLEST SERPL-MCNC: 208 MG/DL (ref 0–199)
CO2 SERPL-SCNC: 27 MEQ/L (ref 20–31)
CREAT SERPL-MCNC: 0.97 MG/DL (ref 0.7–1.2)
ERYTHROCYTE [DISTWIDTH] IN BLOOD BY AUTOMATED COUNT: 12.8 % (ref 11.5–14.5)
GLOBULIN SER CALC-MCNC: 3.6 G/DL (ref 2.3–3.5)
GLUCOSE SERPL-MCNC: 96 MG/DL (ref 70–99)
HCT VFR BLD AUTO: 45.4 % (ref 42–52)
HDLC SERPL-MCNC: 47 MG/DL (ref 40–59)
HEPATITIS C ANTIBODY: NONREACTIVE
HGB BLD-MCNC: 15.2 G/DL (ref 14–18)
LDLC SERPL CALC-MCNC: 139 MG/DL (ref 0–129)
MCH RBC QN AUTO: 29.5 PG (ref 27–31.3)
MCHC RBC AUTO-ENTMCNC: 33.5 % (ref 33–37)
MCV RBC AUTO: 88 FL (ref 79–92.2)
PLATELET # BLD AUTO: 205 K/UL (ref 130–400)
POTASSIUM SERPL-SCNC: 3.5 MEQ/L (ref 3.4–4.9)
PROT SERPL-MCNC: 8 G/DL (ref 6.3–8)
RBC # BLD AUTO: 5.16 M/UL (ref 4.7–6.1)
SODIUM SERPL-SCNC: 140 MEQ/L (ref 135–144)
TRIGL SERPL-MCNC: 109 MG/DL (ref 0–150)
VITAMIN D 25-HYDROXY: 24.6 NG/ML (ref 30–100)
WBC # BLD AUTO: 3.9 K/UL (ref 4.8–10.8)

## 2024-06-11 LAB
HIV QUANT LOG: 1.32 LOG CPY/ML
HIV-1 RNA BY PCR, QN: 21 CPY/ML
HIV-1 RNA BY PCR, QN: DETECTED
RPR SER QL: REACTIVE
RPR TITER: NORMAL
SOURCE: ABNORMAL

## 2024-06-12 LAB
C TRACH DNA UR QL NAA+PROBE: NEGATIVE
CD3+CD4+ CELLS # BLD: 618 CELLS/UL (ref 430–1800)
CD4 % HELPER T CELL: 26 % (ref 32–64)
IMMUNODEFICIENCY MARKERS SPEC-IMP: ABNORMAL
N GONORRHOEA DNA UR QL NAA+PROBE: NEGATIVE

## 2024-06-13 LAB
GAMMA INTERFERON BACKGROUND BLD IA-ACNC: 0.14 IU/ML
M TB IFN-G BLD-IMP: NEGATIVE
M TB IFN-G CD4+ BCKGRND COR BLD-ACNC: 0.02 IU/ML
M TB IFN-G CD4+CD8+ BCKGRND COR BLD-ACNC: 0.02 IU/ML
MITOGEN IGNF BCKGRD COR BLD-ACNC: 9.86 IU/ML

## 2024-06-14 ENCOUNTER — TELEPHONE (OUTPATIENT)
Dept: INFECTIOUS DISEASES | Age: 34
End: 2024-06-14

## 2024-06-14 LAB — T PALLIDUM AB SER QL AGGL: REACTIVE

## 2024-06-14 NOTE — TELEPHONE ENCOUNTER
Per request, CM scheduled transport to [facility] regarding [employer] [place] on 6/17/24.  CM assessed need, arrangements as last resort.  Pt is aware of Transportation Policy.     on 6/17 @ [address] [place] [address] [address]  [place] [address]    Destination  [address] [address]

## 2024-06-14 NOTE — TELEPHONE ENCOUNTER
Call placed to pt regarding need to update RW eligibility and paperwork.   Semi and ISP due      No changes    pt is aware of the program, verbalized understanding and denies questions.     CM apt made 7.16.24 via phone

## 2024-06-20 RX ORDER — ABACAVIR SULFATE, DOLUTEGRAVIR SODIUM, LAMIVUDINE 600; 50; 300 MG/1; MG/1; MG/1
TABLET, FILM COATED ORAL
Qty: 30 TABLET | Refills: 3 | Status: ACTIVE | OUTPATIENT
Start: 2024-06-20

## 2024-06-21 ENCOUNTER — TELEPHONE (OUTPATIENT)
Dept: INFECTIOUS DISEASES | Age: 34
End: 2024-06-21

## 2024-06-21 NOTE — TELEPHONE ENCOUNTER
Per request, CM scheduled transport to appointment with Dr. Lamas on 6/24/24 @ 9:45 AM.  CM assessed need, arrangements as last resort.  Pt is aware of Transportation Policy.  Pt cancelled transport and appointment scheduled for today @ 12:30PM within the guidelines of the Policy.     6/24/24 @ 9:15AM  Pt's residence  [address] [place] [address], [address]  [place] [id number]    Destination  [address]  [place] [address]

## 2024-06-24 ENCOUNTER — OFFICE VISIT (OUTPATIENT)
Dept: INFECTIOUS DISEASES | Age: 34
End: 2024-06-24
Payer: MEDICAID

## 2024-06-24 VITALS
BODY MASS INDEX: 42.52 KG/M2 | HEART RATE: 65 BPM | SYSTOLIC BLOOD PRESSURE: 124 MMHG | HEIGHT: 70 IN | TEMPERATURE: 97.9 F | WEIGHT: 297 LBS | DIASTOLIC BLOOD PRESSURE: 72 MMHG

## 2024-06-24 DIAGNOSIS — Z21 ASYMPTOMATIC HIV INFECTION (HCC): Primary | ICD-10-CM

## 2024-06-24 PROCEDURE — 1036F TOBACCO NON-USER: CPT | Performed by: INTERNAL MEDICINE

## 2024-06-24 PROCEDURE — G8417 CALC BMI ABV UP PARAM F/U: HCPCS | Performed by: INTERNAL MEDICINE

## 2024-06-24 PROCEDURE — G8427 DOCREV CUR MEDS BY ELIG CLIN: HCPCS | Performed by: INTERNAL MEDICINE

## 2024-06-24 PROCEDURE — 99213 OFFICE O/P EST LOW 20 MIN: CPT | Performed by: INTERNAL MEDICINE

## 2024-06-24 ASSESSMENT — PATIENT HEALTH QUESTIONNAIRE - PHQ9
1. LITTLE INTEREST OR PLEASURE IN DOING THINGS: NOT AT ALL
SUM OF ALL RESPONSES TO PHQ QUESTIONS 1-9: 0
SUM OF ALL RESPONSES TO PHQ9 QUESTIONS 1 & 2: 0
2. FEELING DOWN, DEPRESSED OR HOPELESS: NOT AT ALL
SUM OF ALL RESPONSES TO PHQ QUESTIONS 1-9: 0

## 2024-06-24 ASSESSMENT — ENCOUNTER SYMPTOMS
RESPIRATORY NEGATIVE: 1
GASTROINTESTINAL NEGATIVE: 1

## 2024-06-24 NOTE — PROGRESS NOTES
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             Component  Ref Range & Units 6/10/24 1103 11/6/23 1032 7/6/23 1107 2/1/23 1001   Source PLASMA PLASMA PLASMA PLASMA   HIV-1 RNA by PCR, Qn  cpy/mL 21 <20 <20 23.2   HIV Quant Log  Log cpy/mL 1.32 <1.30 <1.30 1.37   HIV-1 RNA by PCR, Qn  NOTDET DETECTED Abnormal  DETECTED Abnormal  CM DETECTED Abnormal  CM DETECTED Abnormal  CM   Comment:            f Range & Units 6/10/24 1103 11/6/23 1032 7/6/23 1107 2/1/23 1001 7/20/22 0921 1/28/22 1044 8/3/21 1034   Lymphocyte Subset Panel 2 Info See Note See Note CM See Note CM See Note CM See Note CM See Note CM See Note CM      CD4 % [place] T Cell  32 - 64 % 26 Low  29 Low  28 Low  27 Low  25 Low  23 Low  22 Low    Absolute CD 4 [place]  430 - 1800 cells/uL [phone]   Resulting Agency [facility]                     Review of Systems   Constitutional: Negative.    HENT: Negative.     Respiratory: Negative.     Cardiovascular: Negative.    Gastrointestinal: Negative.    Genitourinary: Negative.        Review of Systems: All 14 review of systems negative other than as stated above    Social History     Tobacco Use    Smoking status: Never     Passive exposure: Current    Smokeless tobacco: Never   Vaping Use    Vaping Use: Never used   Substance Use Topics    Alcohol use: No    Drug use: Yes     Types: Marijuana (Weed)     Comment: smoked 2 weeks ago         Past Medical History:   Diagnosis Date    HIV (human immunodeficiency virus infection) (HCC)     Immune deficiency disorder (HCC)     HIV not in treatment currently    Late latent syphilis              Current Outpatient Medications on

## 2024-06-24 NOTE — PROGRESS NOTES
Routine apt today.   Working on losing weight. Down about 20 lbs since last visit.   Fasting and ensure. Increased exercise.   Labs reviewed  No health complaints right now.             Compliant with medications --- Triumeq. No misses.   All supplentments dicussed and educatioon providied pt keeps all sepatre from ART    Current partner -- no  Pt is aware of U=U Pt denies questions    Reviewed Vaccines:   Flu vaccine discussed. Refuses  Pneumonia vacc? -- completed- no.    encouraged vaccine and education provided. Pt will think about it. We have discussed this a few times.   Hep B vaccine/screening -- immune      Smoking? no   Etoh? occasionally   Denies this as a problem  Drugs? THC - denies as problem   no others    Transportation: public transport. Working on getting a car. No longer \"on paper\" will be driving soon.      PCP?  Dr Lamas     Working? SSI     Housing? stable     Dental-- Encouraged -- 5 months ago- [facility]    Mental Health -- Jimi- routine      Support group-- reviewed. Will think about group for June 27th.   Would like to go to group for July             HIV primary care-- hospital out pt  Housing-- stable  HIV risk counseling--- yes  Mental health-- yes  Substance abuse--- yes

## 2024-07-15 ENCOUNTER — TELEPHONE (OUTPATIENT)
Dept: INFECTIOUS DISEASES | Age: 34
End: 2024-07-15

## 2024-07-15 NOTE — TELEPHONE ENCOUNTER
Per request, CM scheduled transport to appointment with dentist on 11/14/22 @ 1PM.  CM assessed need, arrangements as last resort.
Yes
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
## 2024-07-16 ENCOUNTER — LAB (OUTPATIENT)
Dept: INFECTIOUS DISEASES | Age: 34
End: 2024-07-16

## 2024-07-16 NOTE — PROGRESS NOTES
Appointment completed via phone.  CM called pt for scheduled appointment.  CM completed Semi-Annual Review, no changes noted to RW Part A Eligibility for services.  CM updated ISP, pt agreed with POC.  Pt remains compliant with care.  VL is undetectable.  CM provided socialization, no specific issues discussed.  Pt has resolved issue regarding housing.  CM will follow up with pt as needed.

## 2024-10-09 RX ORDER — ABACAVIR SULFATE, DOLUTEGRAVIR SODIUM, LAMIVUDINE 600; 50; 300 MG/1; MG/1; MG/1
TABLET, FILM COATED ORAL
Qty: 30 TABLET | Refills: 3 | Status: ACTIVE | OUTPATIENT
Start: 2024-10-09

## 2024-11-12 ENCOUNTER — TELEPHONE (OUTPATIENT)
Dept: INFECTIOUS DISEASES | Age: 34
End: 2024-11-12

## 2024-11-12 DIAGNOSIS — Z21 ASYMPTOMATIC HIV INFECTION (HCC): Primary | ICD-10-CM

## 2024-11-12 NOTE — TELEPHONE ENCOUNTER
Pt called in asking when next apt is. Will be going to [place] the month of December. Family there.   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  He now has his own car.   No med issues and will have anough for his trip.     Also discussed next week support group- he will be going.

## 2024-12-03 DIAGNOSIS — Z21 ASYMPTOMATIC HIV INFECTION (HCC): ICD-10-CM

## 2024-12-03 LAB
ALBUMIN SERPL-MCNC: 4.2 G/DL (ref 3.5–4.6)
ALP SERPL-CCNC: 71 U/L (ref 35–104)
ALT SERPL-CCNC: 25 U/L (ref 0–41)
ANION GAP SERPL CALCULATED.3IONS-SCNC: 7 MEQ/L (ref 9–15)
AST SERPL-CCNC: 23 U/L (ref 0–40)
BILIRUB SERPL-MCNC: <0.2 MG/DL (ref 0.2–0.7)
BUN SERPL-MCNC: 16 MG/DL (ref 6–20)
CALCIUM SERPL-MCNC: 9.3 MG/DL (ref 8.5–9.9)
CHLORIDE SERPL-SCNC: 106 MEQ/L (ref 95–107)
CO2 SERPL-SCNC: 28 MEQ/L (ref 20–31)
CREAT SERPL-MCNC: 0.85 MG/DL (ref 0.7–1.2)
ERYTHROCYTE [DISTWIDTH] IN BLOOD BY AUTOMATED COUNT: 12.5 % (ref 11.5–14.5)
GLOBULIN SER CALC-MCNC: 3.5 G/DL (ref 2.3–3.5)
GLUCOSE SERPL-MCNC: 98 MG/DL (ref 70–99)
HCT VFR BLD AUTO: 41.8 % (ref 42–52)
HGB BLD-MCNC: 14.6 G/DL (ref 14–18)
MCH RBC QN AUTO: 31.1 PG (ref 27–31.3)
MCHC RBC AUTO-ENTMCNC: 34.9 % (ref 33–37)
MCV RBC AUTO: 89.1 FL (ref 79–92.2)
PLATELET # BLD AUTO: 196 K/UL (ref 130–400)
POTASSIUM SERPL-SCNC: 3.5 MEQ/L (ref 3.4–4.9)
PROT SERPL-MCNC: 7.7 G/DL (ref 6.3–8)
RBC # BLD AUTO: 4.69 M/UL (ref 4.7–6.1)
SODIUM SERPL-SCNC: 141 MEQ/L (ref 135–144)
WBC # BLD AUTO: 3.9 K/UL (ref 4.8–10.8)

## 2024-12-04 LAB
CD3+CD4+ CELLS # BLD: 688 CELLS/UL (ref 430–1800)
CD4 % HELPER T CELL: 29 % (ref 32–64)
HIV QUANT LOG: 1.51 LOG CPY/ML
HIV-1 RNA BY PCR, QN: 32.5 CPY/ML
HIV-1 RNA BY PCR, QN: DETECTED
IMMUNODEFICIENCY MARKERS SPEC-IMP: ABNORMAL
SOURCE: ABNORMAL

## 2024-12-09 ENCOUNTER — OFFICE VISIT (OUTPATIENT)
Dept: INFECTIOUS DISEASES | Age: 34
End: 2024-12-09
Payer: MEDICAID

## 2024-12-09 VITALS
HEART RATE: 56 BPM | DIASTOLIC BLOOD PRESSURE: 77 MMHG | RESPIRATION RATE: 18 BRPM | HEIGHT: 71 IN | WEIGHT: 297.4 LBS | OXYGEN SATURATION: 98 % | TEMPERATURE: 97.8 F | SYSTOLIC BLOOD PRESSURE: 136 MMHG | BODY MASS INDEX: 41.64 KG/M2

## 2024-12-09 DIAGNOSIS — Z21 ASYMPTOMATIC HIV INFECTION (HCC): Primary | ICD-10-CM

## 2024-12-09 PROCEDURE — G8484 FLU IMMUNIZE NO ADMIN: HCPCS | Performed by: INTERNAL MEDICINE

## 2024-12-09 PROCEDURE — G8417 CALC BMI ABV UP PARAM F/U: HCPCS | Performed by: INTERNAL MEDICINE

## 2024-12-09 PROCEDURE — 1036F TOBACCO NON-USER: CPT | Performed by: INTERNAL MEDICINE

## 2024-12-09 PROCEDURE — 99213 OFFICE O/P EST LOW 20 MIN: CPT | Performed by: INTERNAL MEDICINE

## 2024-12-09 PROCEDURE — G8427 DOCREV CUR MEDS BY ELIG CLIN: HCPCS | Performed by: INTERNAL MEDICINE

## 2024-12-09 ASSESSMENT — PATIENT HEALTH QUESTIONNAIRE - PHQ9
SUM OF ALL RESPONSES TO PHQ9 QUESTIONS 1 & 2: 2
SUM OF ALL RESPONSES TO PHQ QUESTIONS 1-9: 2
1. LITTLE INTEREST OR PLEASURE IN DOING THINGS: SEVERAL DAYS
SUM OF ALL RESPONSES TO PHQ QUESTIONS 1-9: 2
2. FEELING DOWN, DEPRESSED OR HOPELESS: SEVERAL DAYS
SUM OF ALL RESPONSES TO PHQ QUESTIONS 1-9: 2
SUM OF ALL RESPONSES TO PHQ QUESTIONS 1-9: 2

## 2024-12-09 NOTE — PROGRESS NOTES
Routine apt today. Pt being seen in clinic for 6 month f/u with Dr. Lamas.    Main compliant today: Pt having trouble finding help for car repairs.    Out pt medication list reviewed: Yes. Pt admits to missing doses occasionally.      Reviewed lab work:Yes. Completed on 12/03/24         ART currently: Triumeq  Compliant with medications ---Yes.   All supplements discussed and education provided pt keeps all seperate from ART    Barriers to HIV VL suppression and compliance???   Viral Load -- 32.5         Current partner -- No  U=U was discussed at today's appointment. Pt is aware of U=U and verbalized understanding.     Support system: Mother and family  Monthly support group --Does not attend      Smoking?No.   Etoh? No  Denies this as a problem     Drugs? Smokes marijuana dly. However, pt states it's getting to the point that he doesn't have any interest in smoking. Just on occasion.    Transportation: Has own. Needs repairs.  PCP? Yes.   Working? No. Receives SSI  Housing?      Dental-- Has an appointment with [facility] the end of 01/25  Vision -- Encouraged --     Mental Health -- Yes. Pt is on medication for depression and anxiety.    Where:[facility]         Depression monitoring---Yes    Reviewed Vaccines: Yes. Pt declines all vaccines.  Flu vaccine discussed.  Pneumonia vacc? -encouraged vaccine and education provided.          Safe sex practices are still encouraged to prevent transmission of other STI's, such as, chlamydia, herpes, gonorrhea, and syphilis.

## 2024-12-09 NOTE — PROGRESS NOTES
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      CD4 Percent and Absolute  Order: [id number]  Status: Final result       Visible to patient: Yes (not seen)       Next appt: None       Dx: Asymptomatic HIV infection (HCC)    0 Result Notes            Component  Ref Range & Units 12/3/24 1039 6/10/24 1103 11/6/23 1032 7/6/23 1107 2/1/23 1001 7/20/22 0921 1/28/22 1044   Lymphocyte Subset Panel 2 Info See Note See Note CM See Note CM See Note CM See Note CM See Note CM See Note CM      CD4 % [place] T Cell  32 - 64 % 29 Low  26 Low  29 Low  28 Low  27 Low  25 Low  23 Low    Absolute CD 4 [place]  430 - 1800 cells/uL [phone]   Resulting Agency [facility]          ntains abnormal data HIV RNA, Quantitative, PCR  Order: [id number]  Status: Final result       Visible to patient: Yes (not seen)       Next appt: None       Dx: Asymptomatic HIV infection (HCC)    0 Result Notes          Component  Ref Range & Units 12/3/24 1039 6/10/24 1103 11/6/23 1032 7/6/23 1107 2/1/23 1001   Source PLASMA PLASMA PLASMA PLASMA PLASMA   HIV-1 RNA by PCR, Qn  cpy/mL 32.5 21 <20 <20 23.2   HIV Quant Log  Log cpy/mL 1.51 1.32 <1.30 <1.30 1.37   HIV-1 RNA by PCR, Qn  NOTDET DETECTED Abnormal  DETECTED Abnormal  CM DETECTED Abnormal  CM DETECTED Abnormal  CM DETECTED Abnormal  CM                          Review of Systems   Constitutional: Negative.    HENT: Negative.     Respiratory: Negative.     Cardiovascular: Negative.    Gastrointestinal: Negative.    Genitourinary: Negative.        Review of Systems: All 14 review of systems negative other than as stated above    Social History     Tobacco Use    Smoking

## 2024-12-27 ENCOUNTER — TELEPHONE (OUTPATIENT)
Dept: INFECTIOUS DISEASES | Age: 34
End: 2024-12-27

## 2024-12-27 DIAGNOSIS — Z21 ASYMPTOMATIC HIV INFECTION (HCC): ICD-10-CM

## 2024-12-27 DIAGNOSIS — E55.9 VITAMIN D DEFICIENCY: ICD-10-CM

## 2024-12-27 DIAGNOSIS — E78.00 HYPERCHOLESTEREMIA: Primary | ICD-10-CM

## 2024-12-27 NOTE — TELEPHONE ENCOUNTER
Call placed to pt regarding need to update RW eligibility and paperwork.     Reviewed all needed documentation for update. This includes proof of income ( 1 month total, most recent) proof of residency and/or ID as well as any insurance updates or changes.     Will provide updated income. No other changes  pt is aware of the program and program requirements. Pt verbalized understanding and denies questions.     [facility] apt made  1.13.25 in office    apt with [facility] to review SSDI  States it is a reassessment and he is nervous.   Provided active listening.     Discussed next support group. He is interested.   No transport needed. He is now driving.

## 2025-01-13 ENCOUNTER — LAB (OUTPATIENT)
Dept: INFECTIOUS DISEASES | Age: 35
End: 2025-01-13

## 2025-01-13 NOTE — PROGRESS NOTES
Based upon frequency of alcohol consumption, use your clinical judgement to determine if you should ask the following CAGE questions:                               40b. Have you ever felt you should cut down on your drinking? No                               40c. Have people annoyed you by criticizing your drinking?                                40d. Have you ever felt bad or guilty about your drinking?                                40e. Have you ever had a drink first thin in the morning (as an \"eye opener\") to steady your nerves or get rid of a hangover?                                                           If YES to 2 or more CAGE questions:                               40f. There are resources available to help you quit; would you be interested in referral?      41. Have you used drugs other than for medical reasons (e.g., unprescribed medications, marijuana, methadone, crack, cocaine, ecstasy, heroin, etc.)? Yes                    If YES: 41a. What drugs (other than for medical reasons) are you using? Marijuana                  If YES: Ask the questions on the DAST-20 (Appendix D)                                  41b. Do you use needles to inject drugs? No                                    If YES: educate and refer to the needle access program (e.g.,[facility] at [facility]).     42. Have you ever been in a recovery program?No                   If YES: 42a. When were you in a recovery program? NA    43. Are you currently in a recovery program? No                   If YES: 43a. Recovery program name:                                43b. Length of time in recovery program:                   If NO:    43c. There are resources available for treatment assistance; would you be interested in a referral? No      Annual Notes:     Semi- Annual Notes:    Annual Referrals Needed/Made:    Semi-Annual Referrals Needed/Made:                               Self Management

## 2025-01-20 ENCOUNTER — HOSPITAL ENCOUNTER (EMERGENCY)
Age: 35
Discharge: HOME | End: 2025-01-20
Payer: MEDICAID

## 2025-01-20 ENCOUNTER — TELEPHONE (OUTPATIENT)
Dept: INFECTIOUS DISEASES | Age: 35
End: 2025-01-20

## 2025-01-20 VITALS
BODY MASS INDEX: 41.55 KG/M2 | HEART RATE: 62 BPM | RESPIRATION RATE: 20 BRPM | OXYGEN SATURATION: 99 % | TEMPERATURE: 97.6 F | HEIGHT: 71 IN | WEIGHT: 296.8 LBS | SYSTOLIC BLOOD PRESSURE: 118 MMHG | DIASTOLIC BLOOD PRESSURE: 56 MMHG

## 2025-01-20 DIAGNOSIS — T78.40XA ALLERGIC REACTION, INITIAL ENCOUNTER: Primary | ICD-10-CM

## 2025-01-20 LAB
EKG ATRIAL RATE: 59 BPM
EKG P AXIS: 59 DEGREES
EKG P-R INTERVAL: 186 MS
EKG Q-T INTERVAL: 436 MS
EKG QRS DURATION: 108 MS
EKG QTC CALCULATION (BAZETT): 431 MS
EKG R AXIS: 60 DEGREES
EKG T AXIS: 43 DEGREES
EKG VENTRICULAR RATE: 59 BPM

## 2025-01-20 PROCEDURE — 2580000003 HC RX 258

## 2025-01-20 PROCEDURE — 2500000003 HC RX 250 WO HCPCS

## 2025-01-20 PROCEDURE — 99283 EMERGENCY DEPT VISIT LOW MDM: CPT

## 2025-01-20 PROCEDURE — 96375 TX/PRO/DX INJ NEW DRUG ADDON: CPT

## 2025-01-20 PROCEDURE — 96372 THER/PROPH/DIAG INJ SC/IM: CPT

## 2025-01-20 PROCEDURE — 93010 ELECTROCARDIOGRAM REPORT: CPT | Performed by: INTERNAL MEDICINE

## 2025-01-20 PROCEDURE — 6360000002 HC RX W HCPCS

## 2025-01-20 PROCEDURE — 93005 ELECTROCARDIOGRAM TRACING: CPT

## 2025-01-20 PROCEDURE — 96374 THER/PROPH/DIAG INJ IV PUSH: CPT

## 2025-01-20 RX ORDER — DIPHENHYDRAMINE HYDROCHLORIDE 50 MG/ML
50 INJECTION INTRAMUSCULAR; INTRAVENOUS ONCE
Status: COMPLETED | OUTPATIENT
Start: 2025-01-20 | End: 2025-01-20

## 2025-01-20 RX ORDER — EPINEPHRINE 1 MG/ML
0.3 INJECTION, SOLUTION INTRAMUSCULAR; SUBCUTANEOUS ONCE
Status: COMPLETED | OUTPATIENT
Start: 2025-01-20 | End: 2025-01-20

## 2025-01-20 RX ORDER — DIPHENHYDRAMINE HCL 25 MG
25 CAPSULE ORAL EVERY 6 HOURS PRN
Qty: 30 CAPSULE | Refills: 0 | Status: SHIPPED | OUTPATIENT
Start: 2025-01-20 | End: 2025-01-20

## 2025-01-20 RX ORDER — DIPHENHYDRAMINE HCL 25 MG
25 CAPSULE ORAL EVERY 6 HOURS PRN
Qty: 30 CAPSULE | Refills: 0 | Status: SHIPPED | OUTPATIENT
Start: 2025-01-20

## 2025-01-20 RX ADMIN — FAMOTIDINE 20 MG: 10 INJECTION, SOLUTION INTRAVENOUS at 02:40

## 2025-01-20 RX ADMIN — EPINEPHRINE 0.3 MG: 1 INJECTION INTRAMUSCULAR; INTRAVENOUS; SUBCUTANEOUS at 02:48

## 2025-01-20 RX ADMIN — METHYLPREDNISOLONE SODIUM SUCCINATE 125 MG: 125 INJECTION INTRAMUSCULAR; INTRAVENOUS at 02:41

## 2025-01-20 RX ADMIN — DIPHENHYDRAMINE HYDROCHLORIDE 50 MG: 50 INJECTION INTRAMUSCULAR; INTRAVENOUS at 02:40

## 2025-01-20 ASSESSMENT — ENCOUNTER SYMPTOMS: FACIAL SWELLING: 1

## 2025-01-20 ASSESSMENT — LIFESTYLE VARIABLES
HOW OFTEN DO YOU HAVE A DRINK CONTAINING ALCOHOL: NEVER
HOW MANY STANDARD DRINKS CONTAINING ALCOHOL DO YOU HAVE ON A TYPICAL DAY: PATIENT DOES NOT DRINK

## 2025-01-20 ASSESSMENT — PAIN - FUNCTIONAL ASSESSMENT: PAIN_FUNCTIONAL_ASSESSMENT: NONE - DENIES PAIN

## 2025-01-20 NOTE — ED NOTES
Pt given education on staying in the ED for 24 hours ( Benadryl)  or having someone pick him up. Pt stated that he had no one.

## 2025-01-20 NOTE — ED TRIAGE NOTES
Pt states that he just got back from [place] and was unloading his car when he noticed swelling in his face and lips. Pt states that swelling is all over his body.     Airway is patent at this time.

## 2025-01-20 NOTE — TELEPHONE ENCOUNTER
Pt called in with update  States he was traveling home from [place] today.   Not sure what he touched or got into but had a pura allergic reaction to something.   Went to ER.   Denies SOB. Had swelling and hives.   Feeling better now after ER. Rash seems ok per pt.   Pt will  medication - benadryl   He will call later today with updates.

## 2025-01-20 NOTE — ED PROVIDER NOTES
with sinus arrhythmia; HR 59, normal axis, normal intervals, no ST changes.  In comparison to EKG performed on 03/27/2019, no significant change noted.  No STEMI.  Patient reassessed following IV, IM medications and patient is noted with complete resolution of symptoms with no recurrence following 3 and half hour ED observation.  Patient is stable for disposition and outpatient follow-up with PCP.  Discussed allergen testing with patient and patient is agreeable to contact his PCP for outpatient testing.  Advised to avoid all potential new allergens.  Patient educated on supportive care.  Patient given standard anticipatory guidance, return to ED warning signs, strict follow-up guidelines with PCP. Patient verbalized understanding of education, instruction. Patient is agreeable to plan. Patient discharged home in stable condition.    Problems Addressed:  Allergic reaction, initial encounter: acute illness or injury    Amount and/or Complexity of Data Reviewed  ECG/medicine tests: ordered.    Risk  Prescription drug management.      REASSESSMENT      CRITICAL CARE TIME     CONSULTS:  None    PROCEDURES:  Unless otherwise noted below, none     Procedures      FINAL IMPRESSION      1. Allergic reaction, initial encounter          DISPOSITION/PLAN   DISPOSITION Decision To Discharge 01/20/2025 04:24:13 AM      PATIENT REFERRED TO:  Jonatan Lamas MD  [address] [place] [address]  [phone]    In 1 day      [facility] Emergency Department  [address] [facility] [address]  [phone]    If symptoms worsen      DISCHARGE MEDICATIONS:  Discharge Medication List as of 1/20/2025  4:25 AM        Controlled Substances Monitoring:     RX Monitoring Attestation Periodic Controlled Substance Monitoring   10/3/2018   5:32 PM The Prescription Monitoring Report for this patient was reviewed today. No signs of potential drug abuse or diversion identified.       (Please note that portions of this note were

## 2025-01-20 NOTE — DISCHARGE INSTRUCTIONS
Take medications as needed.     Avoid all potential allergens and follow-up with PCP for allergen testing.     Return to ED if any new, or worsening symptoms.

## 2025-01-23 ENCOUNTER — HOSPITAL ENCOUNTER (EMERGENCY)
Age: 35
Discharge: HOME | End: 2025-01-23
Payer: MEDICAID

## 2025-01-23 ENCOUNTER — APPOINTMENT (OUTPATIENT)
Dept: GENERAL RADIOLOGY | Age: 35
End: 2025-01-23
Payer: MEDICAID

## 2025-01-23 ENCOUNTER — TELEPHONE (OUTPATIENT)
Dept: INFECTIOUS DISEASES | Age: 35
End: 2025-01-23

## 2025-01-23 VITALS
DIASTOLIC BLOOD PRESSURE: 78 MMHG | WEIGHT: 296 LBS | HEIGHT: 71 IN | TEMPERATURE: 99.4 F | SYSTOLIC BLOOD PRESSURE: 137 MMHG | OXYGEN SATURATION: 98 % | HEART RATE: 90 BPM | BODY MASS INDEX: 41.44 KG/M2

## 2025-01-23 DIAGNOSIS — J10.1 INFLUENZA A: Primary | ICD-10-CM

## 2025-01-23 LAB
INFLUENZA A BY PCR: POSITIVE
INFLUENZA B BY PCR: NEGATIVE
SARS-COV-2 RDRP RESP QL NAA+PROBE: NOT DETECTED
STREP GRP A PCR: NEGATIVE

## 2025-01-23 PROCEDURE — 87635 SARS-COV-2 COVID-19 AMP PRB: CPT

## 2025-01-23 PROCEDURE — 71046 X-RAY EXAM CHEST 2 VIEWS: CPT

## 2025-01-23 PROCEDURE — 99284 EMERGENCY DEPT VISIT MOD MDM: CPT

## 2025-01-23 PROCEDURE — 6370000000 HC RX 637 (ALT 250 FOR IP)

## 2025-01-23 PROCEDURE — 87502 INFLUENZA DNA AMP PROBE: CPT

## 2025-01-23 PROCEDURE — 87651 STREP A DNA AMP PROBE: CPT

## 2025-01-23 RX ORDER — ONDANSETRON 4 MG/1
4 TABLET, ORALLY DISINTEGRATING ORAL ONCE
Status: COMPLETED | OUTPATIENT
Start: 2025-01-23 | End: 2025-01-23

## 2025-01-23 RX ORDER — IBUPROFEN 600 MG/1
600 TABLET, FILM COATED ORAL ONCE
Status: COMPLETED | OUTPATIENT
Start: 2025-01-23 | End: 2025-01-23

## 2025-01-23 RX ORDER — IBUPROFEN 600 MG/1
600 TABLET, FILM COATED ORAL EVERY 6 HOURS PRN
Qty: 30 TABLET | Refills: 0 | Status: SHIPPED | OUTPATIENT
Start: 2025-01-23

## 2025-01-23 RX ORDER — ACETAMINOPHEN 325 MG/1
650 TABLET ORAL EVERY 6 HOURS PRN
Qty: 30 TABLET | Refills: 0 | Status: SHIPPED | OUTPATIENT
Start: 2025-01-23

## 2025-01-23 RX ADMIN — IBUPROFEN 600 MG: 600 TABLET, FILM COATED ORAL at 10:42

## 2025-01-23 RX ADMIN — ONDANSETRON 4 MG: 4 TABLET, ORALLY DISINTEGRATING ORAL at 10:42

## 2025-01-23 ASSESSMENT — ENCOUNTER SYMPTOMS
VOICE CHANGE: 0
SHORTNESS OF BREATH: 0
COUGH: 1
SINUS PAIN: 1
WHEEZING: 0
TROUBLE SWALLOWING: 0
STRIDOR: 0
SINUS PRESSURE: 1
ALLERGIC/IMMUNOLOGIC NEGATIVE: 1
VOMITING: 0
NAUSEA: 1
DIARRHEA: 0
ABDOMINAL PAIN: 0
FACIAL SWELLING: 0
EYES NEGATIVE: 1
SORE THROAT: 1

## 2025-01-23 ASSESSMENT — PAIN DESCRIPTION - DESCRIPTORS: DESCRIPTORS: ACHING

## 2025-01-23 ASSESSMENT — PAIN - FUNCTIONAL ASSESSMENT
PAIN_FUNCTIONAL_ASSESSMENT: NONE - DENIES PAIN
PAIN_FUNCTIONAL_ASSESSMENT: 0-10

## 2025-01-23 ASSESSMENT — PAIN DESCRIPTION - LOCATION: LOCATION: THROAT

## 2025-01-23 ASSESSMENT — PAIN SCALES - GENERAL: PAINLEVEL_OUTOF10: 10

## 2025-01-23 ASSESSMENT — PAIN DESCRIPTION - FREQUENCY: FREQUENCY: CONTINUOUS

## 2025-01-23 NOTE — ED TRIAGE NOTES
Patient arrived via private car due to a sore throat and fever with a headache x 2 days. No OTC medication for the fever or pain.

## 2025-01-23 NOTE — ED PROVIDER NOTES
[facility] EMERGENCY DEPARTMENT  eMERGENCY dEPARTMENT eNCOUnter      Pt Name: Trell Alfonso  MRN: [id number]  Birthdate 1990  Date of evaluation: 1/23/2025  Provider: JASBIR Diaz CNP      HISTORY OF PRESENT ILLNESS    Trell Alfonso is a 34 y.o. male who presents to the Emergency Department with past medical history of HIV.  Patient presents today with a 2-day history of fever cough sore throat nausea and bodyaches.  Patient reports pain upon swallowing but denies difficulty swallowing.  There is no trismus drooling or stridor noted.  Patient reports productive cough with brown sputum.  Patient's current temperature is 100.7.  Patient denies fever medication prior to arrival.  Patient reports nausea but denies vomiting.  Patient reports adequate fluid and food intake.  Patient denies chest pain shortness of breath abdominal pain chills headaches lightheadedness dizziness.  Patient is hemodynamically stable nontoxic-appearing.        REVIEW OF SYSTEMS       Review of Systems   Constitutional:  Positive for fatigue and fever. Negative for appetite change, chills and unexpected weight change.   HENT:  Positive for congestion, sinus pressure, sinus pain and sore throat. Negative for drooling, ear pain, facial swelling, trouble swallowing and voice change.    Eyes: Negative.    Respiratory:  Positive for cough. Negative for shortness of breath, wheezing and stridor.    Cardiovascular: Negative.  Negative for chest pain.   Gastrointestinal:  Positive for nausea. Negative for abdominal pain, diarrhea and vomiting.   Endocrine: Negative.    Genitourinary: Negative.    Musculoskeletal:  Positive for myalgias.        Patient reports generalized bodyaches.   Skin: Negative.  Negative for rash.   Allergic/Immunologic: Negative.    Neurological: Negative.  Negative for dizziness, light-headedness and headaches.   Psychiatric/Behavioral: Negative.           PAST MEDICAL HISTORY     Past Medical History:   Diagnosis

## 2025-01-23 NOTE — TELEPHONE ENCOUNTER
Pt notified office he went to ER today. He was not feeling well.  Sore throat, HA and fever    Dx with flu A   Discussed OTC medications and need to stay hydrated and rest. Pt will call with updates.

## 2025-01-24 ENCOUNTER — HOSPITAL ENCOUNTER (EMERGENCY)
Age: 35
Discharge: HOME | End: 2025-01-24
Payer: MEDICAID

## 2025-01-24 VITALS
OXYGEN SATURATION: 99 % | HEART RATE: 100 BPM | TEMPERATURE: 99.9 F | RESPIRATION RATE: 17 BRPM | DIASTOLIC BLOOD PRESSURE: 87 MMHG | WEIGHT: 296 LBS | HEIGHT: 71 IN | SYSTOLIC BLOOD PRESSURE: 149 MMHG | BODY MASS INDEX: 41.44 KG/M2

## 2025-01-24 DIAGNOSIS — R11.0 NAUSEA: ICD-10-CM

## 2025-01-24 DIAGNOSIS — J10.1 INFLUENZA A: Primary | ICD-10-CM

## 2025-01-24 PROCEDURE — 99284 EMERGENCY DEPT VISIT MOD MDM: CPT

## 2025-01-24 PROCEDURE — 6360000002 HC RX W HCPCS: Performed by: PHYSICIAN ASSISTANT

## 2025-01-24 PROCEDURE — 96374 THER/PROPH/DIAG INJ IV PUSH: CPT

## 2025-01-24 PROCEDURE — 2580000003 HC RX 258: Performed by: PHYSICIAN ASSISTANT

## 2025-01-24 RX ORDER — ONDANSETRON 2 MG/ML
4 INJECTION INTRAMUSCULAR; INTRAVENOUS ONCE
Status: COMPLETED | OUTPATIENT
Start: 2025-01-24 | End: 2025-01-24

## 2025-01-24 RX ORDER — ONDANSETRON 4 MG/1
4 TABLET, ORALLY DISINTEGRATING ORAL 3 TIMES DAILY PRN
Qty: 16 TABLET | Refills: 0 | Status: SHIPPED | OUTPATIENT
Start: 2025-01-24

## 2025-01-24 RX ORDER — 0.9 % SODIUM CHLORIDE 0.9 %
1000 INTRAVENOUS SOLUTION INTRAVENOUS ONCE
Status: COMPLETED | OUTPATIENT
Start: 2025-01-24 | End: 2025-01-24

## 2025-01-24 RX ADMIN — ONDANSETRON 4 MG: 2 INJECTION, SOLUTION INTRAMUSCULAR; INTRAVENOUS at 16:48

## 2025-01-24 RX ADMIN — SODIUM CHLORIDE 1000 ML: 9 INJECTION, SOLUTION INTRAVENOUS at 16:49

## 2025-01-24 ASSESSMENT — ENCOUNTER SYMPTOMS
SHORTNESS OF BREATH: 0
ABDOMINAL PAIN: 0
NAUSEA: 1
RHINORRHEA: 0
EYE DISCHARGE: 0
SORE THROAT: 0
COLOR CHANGE: 0
CONSTIPATION: 0
VOMITING: 0
ABDOMINAL DISTENTION: 0

## 2025-01-24 ASSESSMENT — LIFESTYLE VARIABLES
HOW MANY STANDARD DRINKS CONTAINING ALCOHOL DO YOU HAVE ON A TYPICAL DAY: PATIENT DOES NOT DRINK
HOW OFTEN DO YOU HAVE A DRINK CONTAINING ALCOHOL: NEVER

## 2025-01-24 ASSESSMENT — PAIN - FUNCTIONAL ASSESSMENT: PAIN_FUNCTIONAL_ASSESSMENT: NONE - DENIES PAIN

## 2025-01-24 NOTE — ED PROVIDER NOTES
[facility] EMERGENCY DEPARTMENT  EMERGENCY DEPARTMENT ENCOUNTER      Pt Name: Trell Alfonso  MRN: [id number]  Birthdate 1990  Date of evaluation: 1/24/2025  Provider: Benji Stiles PA-C  4:00 PM EST    CHIEF COMPLAINT       Chief Complaint   Patient presents with    Influenza     Pt has the flu and pt states he is dehydrated (denies vomiting and denies diarrhea)  States he isn't feeling any better          HISTORY OF PRESENT ILLNESS   (Location/Symptom, Timing/Onset, Context/Setting, Quality, Duration, Modifying Factors, Severity)  Note limiting factors.   Trell Alfonso is a 34 y.o. male who presents to the emergency department with complaints of bodyaches, chills, chest congestion, nausea, which patient states been ongoing for last 2 days, patient was seen in the emergency department yesterday with a diagnosis of influenza A, patient states since going home has a decreased appetite, not able to keep any food or fluid down.  Concern for dehydration.  Patient denies any urinary complaints, no diarrhea.  Cough is dry nonproductive.  Past medical history significant for immune deficiency, HIV.  History latent syphilis.    HPI    Nursing Notes were reviewed.    REVIEW OF SYSTEMS    (2-9 systems for level 4, 10 or more for level 5)     Review of Systems   Constitutional:  Positive for fatigue. Negative for activity change and appetite change.   HENT:  Negative for congestion, ear discharge, ear pain, nosebleeds, rhinorrhea and sore throat.    Eyes:  Negative for discharge.   Respiratory:  Negative for shortness of breath.    Cardiovascular:  Negative for chest pain, palpitations and leg swelling.   Gastrointestinal:  Positive for nausea. Negative for abdominal distention, abdominal pain, constipation and vomiting.   Genitourinary:  Negative for difficulty urinating and dysuria.   Musculoskeletal:  Negative for arthralgias.   Skin:  Negative for color change.   Neurological:  Positive for weakness. Negative for

## 2025-01-28 ENCOUNTER — OFFICE VISIT (OUTPATIENT)
Dept: INFECTIOUS DISEASES | Age: 35
End: 2025-01-28
Payer: MEDICAID

## 2025-01-28 VITALS
SYSTOLIC BLOOD PRESSURE: 132 MMHG | WEIGHT: 288 LBS | OXYGEN SATURATION: 98 % | BODY MASS INDEX: 40.32 KG/M2 | HEART RATE: 65 BPM | TEMPERATURE: 98.1 F | DIASTOLIC BLOOD PRESSURE: 74 MMHG | RESPIRATION RATE: 16 BRPM | HEIGHT: 71 IN

## 2025-01-28 DIAGNOSIS — J10.1 INFLUENZA A: ICD-10-CM

## 2025-01-28 DIAGNOSIS — Z21 ASYMPTOMATIC HIV INFECTION (HCC): Primary | ICD-10-CM

## 2025-01-28 PROCEDURE — 99213 OFFICE O/P EST LOW 20 MIN: CPT | Performed by: INTERNAL MEDICINE

## 2025-01-28 PROCEDURE — G8427 DOCREV CUR MEDS BY ELIG CLIN: HCPCS | Performed by: INTERNAL MEDICINE

## 2025-01-28 PROCEDURE — G8417 CALC BMI ABV UP PARAM F/U: HCPCS | Performed by: INTERNAL MEDICINE

## 2025-01-28 PROCEDURE — 1036F TOBACCO NON-USER: CPT | Performed by: INTERNAL MEDICINE

## 2025-01-28 ASSESSMENT — ENCOUNTER SYMPTOMS
GASTROINTESTINAL NEGATIVE: 1
RESPIRATORY NEGATIVE: 1

## 2025-01-28 ASSESSMENT — PATIENT HEALTH QUESTIONNAIRE - PHQ9
SUM OF ALL RESPONSES TO PHQ QUESTIONS 1-9: 0
SUM OF ALL RESPONSES TO PHQ9 QUESTIONS 1 & 2: 0
SUM OF ALL RESPONSES TO PHQ QUESTIONS 1-9: 0
2. FEELING DOWN, DEPRESSED OR HOPELESS: NOT AT ALL
SUM OF ALL RESPONSES TO PHQ QUESTIONS 1-9: 0
1. LITTLE INTEREST OR PLEASURE IN DOING THINGS: NOT AT ALL
SUM OF ALL RESPONSES TO PHQ QUESTIONS 1-9: 0

## 2025-01-28 NOTE — PROGRESS NOTES
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      CD4 Percent and Absolute  Order: [id number]  Status: Final result       Visible to patient: Yes (not seen)       Next appt: None       Dx: Asymptomatic HIV infection (HCC)    0 Result Notes            Component  Ref Range & Units 12/3/24 1039 6/10/24 1103 11/6/23 1032 7/6/23 1107 2/1/23 1001 7/20/22 0921 1/28/22 1044   Lymphocyte Subset Panel 2 Info See Note See Note CM See Note CM See Note CM See Note CM See Note CM See Note CM      CD4 % Wyatt T Cell  32 - 64 % 29 Low  26 Low  29 Low  28 Low  27 Low  25 Low  23 Low    Absolute CD 4 Wyatt  430 - 1800 cells/uL [phone]   [facility]          ntains abnormal data HIV RNA, Quantitative, PCR  Order: [id number]  Status: Final result       Visible to patient: Yes (not seen)       Next appt: None       Dx: Asymptomatic HIV infection (HCC)    0 Result Notes          Component  Ref Range & Units 12/3/24 1039 6/10/24 1103 11/6/23 1032 7/6/23 1107 2/1/23 1001   Source PLASMA PLASMA PLASMA PLASMA PLASMA   HIV-1 RNA by PCR, Qn  cpy/mL 32.5 21 <20 <20 23.2   HIV Quant Log  Log cpy/mL 1.51 1.32 <1.30 <1.30 1.37   HIV-1 RNA by PCR, Qn  NOTDET DETECTED Abnormal  DETECTED Abnormal  CM DETECTED Abnormal  CM DETECTED Abnormal  CM DETECTED Abnormal  CM            1/20/2025 patient went to an emergency department complaint of face and mouth swelling  Emergency department gave diagnosis of angioedema  There is no oropharyngeal erythema edema no edema of the soft or hard palates  The swelling noted in the upper lips  Treated as allergic reaction      1/23/2025 patient presented emergency 
     [x] Family         [x] Friends       [] Clubs  [] Spiritual practices   [] Shinto        [] Clergy         [] Councelor   [] Therapist      [] Hobbies                  [] Other -     Discussed monthly support group --   Not interested right now     Denies problems or concerns with support system    Health Maintenance needs.   Refuses all vaccines.                 Safe sex practices are still encouraged to prevent transmission of other STI's, such as, chlamydia, herpes, gonorrhea, and syphilis.

## 2025-01-29 RX ORDER — ABACAVIR SULFATE, DOLUTEGRAVIR SODIUM, LAMIVUDINE 600; 50; 300 MG/1; MG/1; MG/1
TABLET, FILM COATED ORAL
Qty: 30 TABLET | Refills: 3 | Status: ACTIVE | OUTPATIENT
Start: 2025-01-29

## 2025-05-21 ENCOUNTER — TELEPHONE (OUTPATIENT)
Age: 35
End: 2025-05-21

## 2025-05-21 RX ORDER — ABACAVIR SULFATE, DOLUTEGRAVIR SODIUM, LAMIVUDINE 600; 50; 300 MG/1; MG/1; MG/1
1 TABLET, FILM COATED ORAL DAILY
Qty: 30 TABLET | Refills: 3 | Status: ACTIVE | OUTPATIENT
Start: 2025-05-21

## 2025-05-21 NOTE — TELEPHONE ENCOUNTER
Call placed to pt as appt reminder. He also needs lab work completed. Reviewed labs ordered. He/she will go to [facility] pt lab     New place. Waiting on inspection. Denies med issue or misses.   Reviewed appt date and time. Denies issues with appt day.     Requested he call if issues arise regarding apt.      Denies other needs or concerns will call office if needed

## 2025-06-12 ENCOUNTER — CLINICAL SUPPORT (OUTPATIENT)
Age: 35
End: 2025-06-12

## 2025-06-12 NOTE — PROGRESS NOTES
Pt presented in office.  Per request, CM met with pt.  Pt received correspondences from the [employer] [place], Division of Disability Determination.  Pt concerned because he received correspondences recently, has moved to a new residence.  Correspondences were sent to previous address.  CM reviewed all, noted forms to be completed by Doris 15, 2025.  CM called Disability , spoke with \"Scott\".  Per Scott, forms can be completed and faxed to the number provided on the correspondences.  CM assisted pt with completing forms, faxed to Office of Disability .  Pt will need to also provide records from both [facility] (MH Provider) and [facility].  CM encouraged pt to request assistance from  at [facility].      During visit, CM completed Semi-Annual Review.  No changes noted to RW Part A Eligibility for services.  CM updated ISP, pt agreed with POC.  Pt stated he remains compliant with care.  Pt will need to have lab work for appointment with Dr. Lamas on 6/18/25.  CM will follow up with pt as needed.

## 2025-06-16 ENCOUNTER — TELEPHONE (OUTPATIENT)
Age: 35
End: 2025-06-16

## 2025-06-16 DIAGNOSIS — Z21 ASYMPTOMATIC HIV INFECTION (HCC): Primary | ICD-10-CM

## 2025-06-16 NOTE — TELEPHONE ENCOUNTER
Pt confirmed appointment for Wednesday 06/18/25 with Dr. Lamas.  Informed pt that recent lab work is needed.  Per Dr. Lamas, if lab work is not completed, he will need to reschedule.    Pt verbalized an understanding. States he will \"get them today. (06/16/25)\"    This LPN printed out the orders if pt has problems obtaining.

## 2025-06-17 DIAGNOSIS — E78.00 HYPERCHOLESTEREMIA: ICD-10-CM

## 2025-06-17 DIAGNOSIS — Z21 ASYMPTOMATIC HIV INFECTION (HCC): ICD-10-CM

## 2025-06-17 DIAGNOSIS — E55.9 VITAMIN D DEFICIENCY: ICD-10-CM

## 2025-06-17 LAB
ALBUMIN SERPL-MCNC: 4.5 G/DL (ref 3.5–4.6)
ALP SERPL-CCNC: 68 U/L (ref 35–104)
ALT SERPL-CCNC: 24 U/L (ref 0–41)
ANION GAP SERPL CALCULATED.3IONS-SCNC: 12 MEQ/L (ref 9–15)
AST SERPL-CCNC: 30 U/L (ref 0–40)
BASOPHILS # BLD: 0 K/UL (ref 0–0.2)
BASOPHILS NFR BLD: 0.3 %
BILIRUB SERPL-MCNC: 0.3 MG/DL (ref 0.2–0.7)
BUN SERPL-MCNC: 10 MG/DL (ref 6–20)
CALCIUM SERPL-MCNC: 9.4 MG/DL (ref 8.5–9.9)
CHLORIDE SERPL-SCNC: 103 MEQ/L (ref 95–107)
CHOLEST SERPL-MCNC: 193 MG/DL (ref 0–199)
CO2 SERPL-SCNC: 27 MEQ/L (ref 20–31)
CREAT SERPL-MCNC: 1 MG/DL (ref 0.7–1.2)
EOSINOPHIL # BLD: 0.1 K/UL (ref 0–0.7)
EOSINOPHIL NFR BLD: 1.6 %
ERYTHROCYTE [DISTWIDTH] IN BLOOD BY AUTOMATED COUNT: 12.9 % (ref 11.5–14.5)
GLOBULIN SER CALC-MCNC: 3.7 G/DL (ref 2.3–3.5)
GLUCOSE SERPL-MCNC: 90 MG/DL (ref 70–99)
HCT VFR BLD AUTO: 44 % (ref 42–52)
HDLC SERPL-MCNC: 41 MG/DL (ref 40–59)
HEPATITIS C ANTIBODY: NONREACTIVE
HGB BLD-MCNC: 15 G/DL (ref 14–18)
LDL CHOLESTEROL: 126 MG/DL (ref 0–129)
LYMPHOCYTES # BLD: 2.3 K/UL (ref 1–4.8)
LYMPHOCYTES NFR BLD: 62.5 %
MCH RBC QN AUTO: 29.9 PG (ref 27–31.3)
MCHC RBC AUTO-ENTMCNC: 34.1 % (ref 33–37)
MCV RBC AUTO: 87.6 FL (ref 79–92.2)
MONOCYTES # BLD: 0.3 K/UL (ref 0.2–0.8)
MONOCYTES NFR BLD: 8.8 %
NEUTROPHILS # BLD: 1 K/UL (ref 1.4–6.5)
NEUTS SEG NFR BLD: 26.8 %
PLATELET # BLD AUTO: 187 K/UL (ref 130–400)
POTASSIUM SERPL-SCNC: 3.3 MEQ/L (ref 3.4–4.9)
PROT SERPL-MCNC: 8.2 G/DL (ref 6.3–8)
RBC # BLD AUTO: 5.02 M/UL (ref 4.7–6.1)
REAGIN+T PALLIDUM IGG+IGM SERPL-IMP: REACTIVE
SODIUM SERPL-SCNC: 142 MEQ/L (ref 135–144)
TRIGLYCERIDE, FASTING: 128 MG/DL (ref 0–150)
VITAMIN D 25-HYDROXY: 30.2 NG/ML (ref 30–100)
WBC # BLD AUTO: 3.7 K/UL (ref 4.8–10.8)

## 2025-06-18 ENCOUNTER — OFFICE VISIT (OUTPATIENT)
Age: 35
End: 2025-06-18
Payer: MEDICAID

## 2025-06-18 VITALS
BODY MASS INDEX: 39.47 KG/M2 | HEART RATE: 83 BPM | SYSTOLIC BLOOD PRESSURE: 113 MMHG | DIASTOLIC BLOOD PRESSURE: 77 MMHG | TEMPERATURE: 97.9 F | WEIGHT: 283 LBS

## 2025-06-18 DIAGNOSIS — Z21 ASYMPTOMATIC HIV INFECTION (HCC): Primary | ICD-10-CM

## 2025-06-18 DIAGNOSIS — Z86.19 H/O SYPHILIS: ICD-10-CM

## 2025-06-18 LAB
CD3+CD4+ CELLS # BLD: 705 CELLS/UL (ref 430–1800)
CD4 % HELPER T CELL: 25 % (ref 32–64)
IMMUNODEFICIENCY MARKERS SPEC-IMP: ABNORMAL

## 2025-06-18 PROCEDURE — 1036F TOBACCO NON-USER: CPT | Performed by: INTERNAL MEDICINE

## 2025-06-18 PROCEDURE — G8427 DOCREV CUR MEDS BY ELIG CLIN: HCPCS | Performed by: INTERNAL MEDICINE

## 2025-06-18 PROCEDURE — G8417 CALC BMI ABV UP PARAM F/U: HCPCS | Performed by: INTERNAL MEDICINE

## 2025-06-18 PROCEDURE — 99212 OFFICE O/P EST SF 10 MIN: CPT | Performed by: INTERNAL MEDICINE

## 2025-06-18 PROCEDURE — 99213 OFFICE O/P EST LOW 20 MIN: CPT | Performed by: INTERNAL MEDICINE

## 2025-06-18 RX ORDER — TRAZODONE HYDROCHLORIDE 50 MG/1
50 TABLET ORAL NIGHTLY
COMMUNITY
Start: 2025-05-05

## 2025-06-18 ASSESSMENT — PATIENT HEALTH QUESTIONNAIRE - PHQ9
SUM OF ALL RESPONSES TO PHQ QUESTIONS 1-9: 1
SUM OF ALL RESPONSES TO PHQ QUESTIONS 1-9: 1
1. LITTLE INTEREST OR PLEASURE IN DOING THINGS: NOT AT ALL
2. FEELING DOWN, DEPRESSED OR HOPELESS: SEVERAL DAYS
SUM OF ALL RESPONSES TO PHQ QUESTIONS 1-9: 1
SUM OF ALL RESPONSES TO PHQ QUESTIONS 1-9: 1

## 2025-06-18 ASSESSMENT — ENCOUNTER SYMPTOMS
RESPIRATORY NEGATIVE: 1
GASTROINTESTINAL NEGATIVE: 1

## 2025-06-18 NOTE — PROGRESS NOTES
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                     Component  Ref Range & Units 12/3/24 1039 6/10/24 1103 11/6/23 1032 7/6/23 1107 2/1/23 1001 7/20/22 0921 1/28/22 1044   Lymphocyte Subset Panel 2 Info See Note See Note CM See Note CM See Note CM See Note CM See Note CM See Note CM      CD4 % Palmer T Cell  32 - 64 % 29 Low  26 Low  29 Low  28 Low  27 Low  25 Low  23 Low    Absolute CD 4 Palmer  430 - 1800 cells/uL [phone]   [facility]          ntains abnormal data HIV RNA, Quantitative, PCR  Order: [id number]  Status: Final result       Visible to patient: Yes (not seen)       Next appt: None       Dx: Asymptomatic HIV infection (HCC)    0 Result Notes          Component  Ref Range & Units 12/3/24 1039 6/10/24 1103 11/6/23 1032 7/6/23 1107 2/1/23 1001   Source PLASMA PLASMA PLASMA PLASMA PLASMA   HIV-1 RNA by PCR, Qn  cpy/mL 32.5 21 <20 <20 23.2   HIV Quant Log  Log cpy/mL 1.51 1.32 <1.30 <1.30 1.37   HIV-1 RNA by PCR, Qn  NOTDET DETECTED Abnormal  DETECTED Abnormal  CM DETECTED Abnormal  CM DETECTED Abnormal  CM DETECTED Abnormal  CM            RPR    2/20/2022 1-16  7/6/2023 1-16  6/4/2024 1-8                Review of Systems   Constitutional: Negative.    HENT: Negative.     Respiratory: Negative.     Cardiovascular: Negative.    Gastrointestinal: Negative.    Genitourinary: Negative.        Review of Systems: All 14 review of systems negative other than as stated above    Social History     Tobacco Use    Smoking status: Never     Passive exposure: Current    Smokeless tobacco: Never   Vaping Use    Vaping status: Never Used   Substance Use

## 2025-06-18 NOTE — PROGRESS NOTES
Routine apt today.     Main compliant today -- NA  Feeling down. Recently homeless but     Out pt medication list reviewed -     Reviewed lab work  -- some pending.     No smoking drinking or drug use    THC? yes  Denies this as a problem.     ART currently:  Triumeq  Compliant with medications ---   All supplentments dicussed and education provided regarding need to keep supplements sepatre from ART.    -- pt verbalized all understanding.      Barriers to HIV VL suppression and compliance - yes, homelessness.   Admits to missing 1 week in the past 30 days to due stress with housing.   Viral Load -- pending  Medication concerns- not at this time  How many misses in the past 30 days - 1 week    Partner  Current partner -- no  Concerns- denies   NSA     U=U   Pt is aware of U=U and verbalized understanding.     Dental--education provided on importance of dental cleaning and encouraged follow up --   When: 3/2025  Where: [facility]  Next apt: 6 months      Mental Health --   Depression screening completed today: up and down  Working [employer] with [employer]. Every 3 months he has apts.     Housing:   apartment    Needs housing assistance---Not at this time -- currently stable     Transport:  car    Reviewed other options for transportation assistance.  ---Not at this time      Medical Insurance:  Payor: [employer] [facility] PL / Plan: [employer] [facility] COMMUNITY PLAN [place] / Product Type: *No Product type* /     Insurance issues/concerns ---Not at this time     Pharmacy:      [id number] [place] [facility] [address] - P [phone] - F [phone]  [address] [place] [id number]  Phone: [phone] Fax: [phone]    [facility] - [place], [place] - [address] - P [phone] - F [phone]  [address]  [address]  [facility] [address]  Phone: [phone] Fax: [phone]    [facility] [place] - [address] - P [phone] - F [phone]

## 2025-06-19 LAB
GAMMA INTERFERON BACKGROUND BLD IA-ACNC: 0.19 IU/ML
M TB IFN-G BLD-IMP: NEGATIVE
M TB IFN-G CD4+ BCKGRND COR BLD-ACNC: 0.02 IU/ML
M TB IFN-G CD4+CD8+ BCKGRND COR BLD-ACNC: 0.09 IU/ML
MITOGEN IGNF BCKGRD COR BLD-ACNC: 9.81 IU/ML

## 2025-06-20 LAB
C TRACH DNA UR QL NAA+PROBE: NEGATIVE
HIV QUANT LOG: <1.3 LOG CPY/ML
HIV-1 RNA BY PCR, QN: <20 CPY/ML
HIV-1 RNA BY PCR, QN: DETECTED
N GONORRHOEA DNA UR QL NAA+PROBE: NEGATIVE
SOURCE: ABNORMAL

## 2025-07-14 ENCOUNTER — TELEPHONE (OUTPATIENT)
Age: 35
End: 2025-07-14

## 2025-07-14 NOTE — TELEPHONE ENCOUNTER
Pt called, inquired if he would need to physically present in office to sign CASSIE in order for medical records to be released to [employer].  CM informed pt that he may have CASSIE faxed to this office.  CASSIE can be forwarded to the Medical Records Dept.  Otherwise, medical staff irving be able to provide information if not too extensive.  CM will follow up with pt as needed.

## 2025-07-18 ENCOUNTER — TELEPHONE (OUTPATIENT)
Age: 35
End: 2025-07-18

## 2025-07-18 DIAGNOSIS — Z86.19 H/O SYPHILIS: Primary | ICD-10-CM

## 2025-07-18 NOTE — TELEPHONE ENCOUNTER
Pt called in requesting to review recent lab work.    Reviewed resulted labs. Discussed lower Vit D level. Does not take OTC  Will review with Dr.       Pt denied questions or concerns.     Adherence- denies issues     Denies any needs at this time. Denies any issues that need Doctor attention. Will call with concerns

## 2025-07-18 NOTE — TELEPHONE ENCOUNTER
Call placed to pt to discuss up coming support group. Date 7.31.25     Discussed upcoming HIV support group.       Pt is interested in coming. Denies need for transportation help.   Directions, time and place reviewed with pt as well.   Pt will call to confirm.       Denies any needs at this time. Denies any issues that need Doctor attention. Will call with concerns

## 2025-08-29 ENCOUNTER — TELEPHONE (OUTPATIENT)
Age: 35
End: 2025-08-29

## 2025-09-04 ENCOUNTER — TELEPHONE (OUTPATIENT)
Age: 35
End: 2025-09-04

## (undated) DEVICE — ADAPTER FLSH PMP FLD MGMT GI IRRIG OFP 2 DISPOSABLE

## (undated) DEVICE — SINGLE PORT MANIFOLD: Brand: NEPTUNE 2

## (undated) DEVICE — BRUSH ENDO CLN L90.5IN SHTH DIA1.7MM BRIST DIA5-7MM 2-6MM

## (undated) DEVICE — GLOVE ORANGE PI 8   MSG9080

## (undated) DEVICE — SPONGE GZ W4XL4IN COT 12 PLY TYP VII WVN C FLD DSGN

## (undated) DEVICE — TUBE SET 96 MM 64 MM H2O PERISTALTIC STD AUX CHANNEL

## (undated) DEVICE — JELLY,LUBE,STERILE,FLIP TOP,TUBE,2-OZ: Brand: MEDLINE

## (undated) DEVICE — Device: Brand: ENDO SMARTCAP